# Patient Record
Sex: MALE | Race: WHITE | URBAN - METROPOLITAN AREA
[De-identification: names, ages, dates, MRNs, and addresses within clinical notes are randomized per-mention and may not be internally consistent; named-entity substitution may affect disease eponyms.]

---

## 2017-11-17 ENCOUNTER — APPOINTMENT (RX ONLY)
Dept: URBAN - METROPOLITAN AREA CLINIC 4 | Facility: CLINIC | Age: 62
Setting detail: DERMATOLOGY
End: 2017-11-17

## 2017-11-17 DIAGNOSIS — D22 MELANOCYTIC NEVI: ICD-10-CM

## 2017-11-17 DIAGNOSIS — D18.0 HEMANGIOMA: ICD-10-CM

## 2017-11-17 DIAGNOSIS — L82.1 OTHER SEBORRHEIC KERATOSIS: ICD-10-CM

## 2017-11-17 DIAGNOSIS — L81.4 OTHER MELANIN HYPERPIGMENTATION: ICD-10-CM

## 2017-11-17 DIAGNOSIS — L57.0 ACTINIC KERATOSIS: ICD-10-CM

## 2017-11-17 PROBLEM — D22.61 MELANOCYTIC NEVI OF RIGHT UPPER LIMB, INCLUDING SHOULDER: Status: ACTIVE | Noted: 2017-11-17

## 2017-11-17 PROBLEM — M12.9 ARTHROPATHY, UNSPECIFIED: Status: ACTIVE | Noted: 2017-11-17

## 2017-11-17 PROBLEM — D22.5 MELANOCYTIC NEVI OF TRUNK: Status: ACTIVE | Noted: 2017-11-17

## 2017-11-17 PROBLEM — D18.01 HEMANGIOMA OF SKIN AND SUBCUTANEOUS TISSUE: Status: ACTIVE | Noted: 2017-11-17

## 2017-11-17 PROBLEM — D22.62 MELANOCYTIC NEVI OF LEFT UPPER LIMB, INCLUDING SHOULDER: Status: ACTIVE | Noted: 2017-11-17

## 2017-11-17 PROBLEM — D22.39 MELANOCYTIC NEVI OF OTHER PARTS OF FACE: Status: ACTIVE | Noted: 2017-11-17

## 2017-11-17 PROCEDURE — ? LIQUID NITROGEN

## 2017-11-17 PROCEDURE — ? COUNSELING

## 2017-11-17 PROCEDURE — 99213 OFFICE O/P EST LOW 20 MIN: CPT | Mod: 25

## 2017-11-17 PROCEDURE — ? SUNSCREEN RECOMMENDATIONS

## 2017-11-17 PROCEDURE — 17004 DESTROY PREMAL LESIONS 15/>: CPT

## 2017-11-17 ASSESSMENT — LOCATION SIMPLE DESCRIPTION DERM
LOCATION SIMPLE: LEFT FOREHEAD
LOCATION SIMPLE: LEFT UPPER BACK
LOCATION SIMPLE: CHEST
LOCATION SIMPLE: LEFT EYEBROW
LOCATION SIMPLE: POSTERIOR SCALP
LOCATION SIMPLE: RIGHT FOREARM
LOCATION SIMPLE: SCALP
LOCATION SIMPLE: RIGHT EYEBROW
LOCATION SIMPLE: RIGHT CHEEK
LOCATION SIMPLE: TRAPEZIAL NECK
LOCATION SIMPLE: RIGHT EAR
LOCATION SIMPLE: LEFT CHEEK
LOCATION SIMPLE: LEFT OCCIPITAL SCALP
LOCATION SIMPLE: LEFT FOREARM

## 2017-11-17 ASSESSMENT — LOCATION DETAILED DESCRIPTION DERM
LOCATION DETAILED: LEFT INFERIOR CENTRAL MALAR CHEEK
LOCATION DETAILED: RIGHT INFERIOR POSTAURICULAR SKIN
LOCATION DETAILED: MID TRAPEZIAL NECK
LOCATION DETAILED: MID-OCCIPITAL SCALP
LOCATION DETAILED: RIGHT SUPERIOR CENTRAL MALAR CHEEK
LOCATION DETAILED: RIGHT CENTRAL EYEBROW
LOCATION DETAILED: RIGHT SUPERIOR LATERAL MALAR CHEEK
LOCATION DETAILED: LEFT PROXIMAL DORSAL FOREARM
LOCATION DETAILED: LEFT SUPERIOR LATERAL MALAR CHEEK
LOCATION DETAILED: RIGHT LATERAL SUPERIOR CHEST
LOCATION DETAILED: LEFT LATERAL EYEBROW
LOCATION DETAILED: LEFT MEDIAL FOREHEAD
LOCATION DETAILED: LEFT MID-UPPER BACK
LOCATION DETAILED: LEFT SUPERIOR OCCIPITAL SCALP
LOCATION DETAILED: RIGHT LATERAL EYEBROW
LOCATION DETAILED: LEFT FOREHEAD
LOCATION DETAILED: LEFT INFERIOR FOREHEAD
LOCATION DETAILED: LEFT CENTRAL MALAR CHEEK
LOCATION DETAILED: RIGHT DISTAL ULNAR DORSAL FOREARM
LOCATION DETAILED: RIGHT CENTRAL MALAR CHEEK
LOCATION DETAILED: RIGHT SUPERIOR HELIX
LOCATION DETAILED: RIGHT PROXIMAL DORSAL FOREARM
LOCATION DETAILED: STERNAL NOTCH
LOCATION DETAILED: LEFT MEDIAL UPPER BACK
LOCATION DETAILED: RIGHT ANTIHELIX
LOCATION DETAILED: LEFT SUPERIOR MEDIAL UPPER BACK

## 2017-11-17 ASSESSMENT — LOCATION ZONE DERM
LOCATION ZONE: SCALP
LOCATION ZONE: NECK
LOCATION ZONE: TRUNK
LOCATION ZONE: ARM
LOCATION ZONE: EAR
LOCATION ZONE: FACE

## 2017-11-17 NOTE — PROCEDURE: LIQUID NITROGEN
Duration Of Freeze Thaw-Cycle (Seconds): 3
Detail Level: Detailed
Render Post-Care Instructions In Note?: no
Post-Care Instructions: I reviewed with the patient in detail post-care instructions. Patient is to wear sunprotection, and avoid picking at any of the treated lesions. Pt may apply Vaseline to crusted or scabbing areas.
Number Of Freeze-Thaw Cycles: 2 freeze-thaw cycles
Consent: The patient's consent was obtained including but not limited to risks of crusting, scabbing, blistering, scarring, darker or lighter pigmentary change, recurrence, incomplete removal and infection.

## 2018-01-12 ENCOUNTER — HOSPITAL ENCOUNTER (OUTPATIENT)
Dept: LAB | Facility: MEDICAL CENTER | Age: 63
End: 2018-01-12
Attending: INTERNAL MEDICINE
Payer: COMMERCIAL

## 2018-01-12 LAB
ALBUMIN SERPL BCP-MCNC: 4.1 G/DL (ref 3.2–4.9)
ALP SERPL-CCNC: 56 U/L (ref 30–99)
ALT SERPL-CCNC: 13 U/L (ref 2–50)
AST SERPL-CCNC: 19 U/L (ref 12–45)
BILIRUB CONJ SERPL-MCNC: 0.1 MG/DL (ref 0.1–0.5)
BILIRUB INDIRECT SERPL-MCNC: 0.5 MG/DL (ref 0–1)
BILIRUB SERPL-MCNC: 0.6 MG/DL (ref 0.1–1.5)
CHOLEST SERPL-MCNC: 200 MG/DL (ref 100–199)
HDLC SERPL-MCNC: 45 MG/DL
LDLC SERPL CALC-MCNC: 130 MG/DL
PROT SERPL-MCNC: 6.9 G/DL (ref 6–8.2)
TRIGL SERPL-MCNC: 126 MG/DL (ref 0–149)

## 2018-01-12 PROCEDURE — 80061 LIPID PANEL: CPT

## 2018-01-12 PROCEDURE — 80076 HEPATIC FUNCTION PANEL: CPT

## 2018-01-12 PROCEDURE — 36415 COLL VENOUS BLD VENIPUNCTURE: CPT

## 2018-02-05 ENCOUNTER — HOSPITAL ENCOUNTER (OUTPATIENT)
Dept: LAB | Facility: MEDICAL CENTER | Age: 63
End: 2018-02-05
Attending: INTERNAL MEDICINE
Payer: COMMERCIAL

## 2018-02-05 LAB
ALBUMIN SERPL BCP-MCNC: 3.9 G/DL (ref 3.2–4.9)
ALP SERPL-CCNC: 67 U/L (ref 30–99)
ALT SERPL-CCNC: 15 U/L (ref 2–50)
AST SERPL-CCNC: 21 U/L (ref 12–45)
BILIRUB CONJ SERPL-MCNC: <0.1 MG/DL (ref 0.1–0.5)
BILIRUB INDIRECT SERPL-MCNC: NORMAL MG/DL (ref 0–1)
BILIRUB SERPL-MCNC: 0.4 MG/DL (ref 0.1–1.5)
CHOLEST SERPL-MCNC: 193 MG/DL (ref 100–199)
HDLC SERPL-MCNC: 47 MG/DL
LDLC SERPL CALC-MCNC: 131 MG/DL
PROT SERPL-MCNC: 6.8 G/DL (ref 6–8.2)
TRIGL SERPL-MCNC: 77 MG/DL (ref 0–149)

## 2018-02-05 PROCEDURE — 80061 LIPID PANEL: CPT

## 2018-02-05 PROCEDURE — 36415 COLL VENOUS BLD VENIPUNCTURE: CPT

## 2018-02-05 PROCEDURE — 80076 HEPATIC FUNCTION PANEL: CPT

## 2018-03-05 ENCOUNTER — HOSPITAL ENCOUNTER (OUTPATIENT)
Dept: LAB | Facility: MEDICAL CENTER | Age: 63
End: 2018-03-05
Attending: FAMILY MEDICINE
Payer: COMMERCIAL

## 2018-03-05 LAB
ALBUMIN SERPL BCP-MCNC: 4 G/DL (ref 3.2–4.9)
ALBUMIN/GLOB SERPL: 1.3 G/DL
ALP SERPL-CCNC: 59 U/L (ref 30–99)
ALT SERPL-CCNC: 9 U/L (ref 2–50)
ANION GAP SERPL CALC-SCNC: 6 MMOL/L (ref 0–11.9)
AST SERPL-CCNC: 17 U/L (ref 12–45)
BASOPHILS # BLD AUTO: 0.8 % (ref 0–1.8)
BASOPHILS # BLD: 0.06 K/UL (ref 0–0.12)
BILIRUB SERPL-MCNC: 0.5 MG/DL (ref 0.1–1.5)
BUN SERPL-MCNC: 18 MG/DL (ref 8–22)
CALCIUM SERPL-MCNC: 9.7 MG/DL (ref 8.5–10.5)
CHLORIDE SERPL-SCNC: 104 MMOL/L (ref 96–112)
CO2 SERPL-SCNC: 28 MMOL/L (ref 20–33)
CREAT SERPL-MCNC: 1.28 MG/DL (ref 0.5–1.4)
CRP SERPL HS-MCNC: 1.38 MG/DL (ref 0–0.75)
EOSINOPHIL # BLD AUTO: 0.16 K/UL (ref 0–0.51)
EOSINOPHIL NFR BLD: 2.1 % (ref 0–6.9)
ERYTHROCYTE [DISTWIDTH] IN BLOOD BY AUTOMATED COUNT: 43.1 FL (ref 35.9–50)
ERYTHROCYTE [SEDIMENTATION RATE] IN BLOOD BY WESTERGREN METHOD: 23 MM/HOUR (ref 0–20)
GLOBULIN SER CALC-MCNC: 3.1 G/DL (ref 1.9–3.5)
GLUCOSE SERPL-MCNC: 96 MG/DL (ref 65–99)
HCT VFR BLD AUTO: 50.1 % (ref 42–52)
HGB BLD-MCNC: 16.5 G/DL (ref 14–18)
IMM GRANULOCYTES # BLD AUTO: 0.03 K/UL (ref 0–0.11)
IMM GRANULOCYTES NFR BLD AUTO: 0.4 % (ref 0–0.9)
LYMPHOCYTES # BLD AUTO: 1.38 K/UL (ref 1–4.8)
LYMPHOCYTES NFR BLD: 18.1 % (ref 22–41)
MCH RBC QN AUTO: 30.1 PG (ref 27–33)
MCHC RBC AUTO-ENTMCNC: 32.9 G/DL (ref 33.7–35.3)
MCV RBC AUTO: 91.4 FL (ref 81.4–97.8)
MONOCYTES # BLD AUTO: 0.63 K/UL (ref 0–0.85)
MONOCYTES NFR BLD AUTO: 8.3 % (ref 0–13.4)
NEUTROPHILS # BLD AUTO: 5.35 K/UL (ref 1.82–7.42)
NEUTROPHILS NFR BLD: 70.3 % (ref 44–72)
NRBC # BLD AUTO: 0 K/UL
NRBC BLD-RTO: 0 /100 WBC
PLATELET # BLD AUTO: 286 K/UL (ref 164–446)
PMV BLD AUTO: 8.9 FL (ref 9–12.9)
POTASSIUM SERPL-SCNC: 4.3 MMOL/L (ref 3.6–5.5)
PROT SERPL-MCNC: 7.1 G/DL (ref 6–8.2)
RBC # BLD AUTO: 5.48 M/UL (ref 4.7–6.1)
SODIUM SERPL-SCNC: 138 MMOL/L (ref 135–145)
WBC # BLD AUTO: 7.6 K/UL (ref 4.8–10.8)

## 2018-03-05 PROCEDURE — 85025 COMPLETE CBC W/AUTO DIFF WBC: CPT

## 2018-03-05 PROCEDURE — 85652 RBC SED RATE AUTOMATED: CPT

## 2018-03-05 PROCEDURE — 86140 C-REACTIVE PROTEIN: CPT

## 2018-03-05 PROCEDURE — 36415 COLL VENOUS BLD VENIPUNCTURE: CPT

## 2018-03-05 PROCEDURE — 80053 COMPREHEN METABOLIC PANEL: CPT

## 2018-05-18 ENCOUNTER — HOSPITAL ENCOUNTER (OUTPATIENT)
Dept: LAB | Facility: MEDICAL CENTER | Age: 63
End: 2018-05-18
Attending: HOMEOPATH
Payer: COMMERCIAL

## 2018-05-18 LAB
25(OH)D3 SERPL-MCNC: 56 NG/ML (ref 30–100)
ALBUMIN SERPL BCP-MCNC: 4.1 G/DL (ref 3.2–4.9)
ALBUMIN/GLOB SERPL: 1.4 G/DL
ALP SERPL-CCNC: 73 U/L (ref 30–99)
ALT SERPL-CCNC: 10 U/L (ref 2–50)
ANION GAP SERPL CALC-SCNC: 7 MMOL/L (ref 0–11.9)
AST SERPL-CCNC: 14 U/L (ref 12–45)
BASOPHILS # BLD AUTO: 1 % (ref 0–1.8)
BASOPHILS # BLD: 0.08 K/UL (ref 0–0.12)
BILIRUB SERPL-MCNC: 0.5 MG/DL (ref 0.1–1.5)
BUN SERPL-MCNC: 14 MG/DL (ref 8–22)
CALCIUM SERPL-MCNC: 9.4 MG/DL (ref 8.5–10.5)
CHLORIDE SERPL-SCNC: 103 MMOL/L (ref 96–112)
CHOLEST SERPL-MCNC: 200 MG/DL (ref 100–199)
CO2 SERPL-SCNC: 27 MMOL/L (ref 20–33)
CREAT SERPL-MCNC: 1.06 MG/DL (ref 0.5–1.4)
EOSINOPHIL # BLD AUTO: 0.15 K/UL (ref 0–0.51)
EOSINOPHIL NFR BLD: 1.9 % (ref 0–6.9)
ERYTHROCYTE [DISTWIDTH] IN BLOOD BY AUTOMATED COUNT: 42 FL (ref 35.9–50)
ESTRADIOL SERPL-MCNC: 26 PG/ML
GLOBULIN SER CALC-MCNC: 3 G/DL (ref 1.9–3.5)
GLUCOSE SERPL-MCNC: 95 MG/DL (ref 65–99)
HCT VFR BLD AUTO: 47.5 % (ref 42–52)
HDLC SERPL-MCNC: 44 MG/DL
HGB BLD-MCNC: 15.7 G/DL (ref 14–18)
IMM GRANULOCYTES # BLD AUTO: 0.03 K/UL (ref 0–0.11)
IMM GRANULOCYTES NFR BLD AUTO: 0.4 % (ref 0–0.9)
LDLC SERPL CALC-MCNC: 135 MG/DL
LYMPHOCYTES # BLD AUTO: 1.48 K/UL (ref 1–4.8)
LYMPHOCYTES NFR BLD: 19 % (ref 22–41)
MCH RBC QN AUTO: 29.8 PG (ref 27–33)
MCHC RBC AUTO-ENTMCNC: 33.1 G/DL (ref 33.7–35.3)
MCV RBC AUTO: 90.3 FL (ref 81.4–97.8)
MONOCYTES # BLD AUTO: 0.67 K/UL (ref 0–0.85)
MONOCYTES NFR BLD AUTO: 8.6 % (ref 0–13.4)
NEUTROPHILS # BLD AUTO: 5.38 K/UL (ref 1.82–7.42)
NEUTROPHILS NFR BLD: 69.1 % (ref 44–72)
NRBC # BLD AUTO: 0 K/UL
NRBC BLD-RTO: 0 /100 WBC
PLATELET # BLD AUTO: 301 K/UL (ref 164–446)
PMV BLD AUTO: 9 FL (ref 9–12.9)
POTASSIUM SERPL-SCNC: 4.1 MMOL/L (ref 3.6–5.5)
PROT SERPL-MCNC: 7.1 G/DL (ref 6–8.2)
PSA SERPL-MCNC: 2.78 NG/ML (ref 0–4)
RBC # BLD AUTO: 5.26 M/UL (ref 4.7–6.1)
SODIUM SERPL-SCNC: 137 MMOL/L (ref 135–145)
T3FREE SERPL-MCNC: 3.76 PG/ML (ref 2.4–4.2)
T4 SERPL-MCNC: 10.3 UG/DL (ref 4–12)
TRIGL SERPL-MCNC: 106 MG/DL (ref 0–149)
TSH SERPL DL<=0.005 MIU/L-ACNC: 2.59 UIU/ML (ref 0.38–5.33)
WBC # BLD AUTO: 7.8 K/UL (ref 4.8–10.8)

## 2018-05-18 PROCEDURE — 82542 COL CHROMOTOGRAPHY QUAL/QUAN: CPT

## 2018-05-18 PROCEDURE — 84436 ASSAY OF TOTAL THYROXINE: CPT

## 2018-05-18 PROCEDURE — 36415 COLL VENOUS BLD VENIPUNCTURE: CPT

## 2018-05-18 PROCEDURE — 84270 ASSAY OF SEX HORMONE GLOBUL: CPT

## 2018-05-18 PROCEDURE — 84443 ASSAY THYROID STIM HORMONE: CPT

## 2018-05-18 PROCEDURE — 84403 ASSAY OF TOTAL TESTOSTERONE: CPT

## 2018-05-18 PROCEDURE — 84481 FREE ASSAY (FT-3): CPT

## 2018-05-18 PROCEDURE — 82670 ASSAY OF TOTAL ESTRADIOL: CPT

## 2018-05-18 PROCEDURE — 85025 COMPLETE CBC W/AUTO DIFF WBC: CPT

## 2018-05-18 PROCEDURE — 84153 ASSAY OF PSA TOTAL: CPT

## 2018-05-18 PROCEDURE — 80061 LIPID PANEL: CPT

## 2018-05-18 PROCEDURE — 80053 COMPREHEN METABOLIC PANEL: CPT

## 2018-05-18 PROCEDURE — 82306 VITAMIN D 25 HYDROXY: CPT

## 2018-05-20 LAB
SHBG SERPL-SCNC: 35 NMOL/L (ref 11–80)
TESTOST FREE MFR SERPL: 1.9 % (ref 1.6–2.9)
TESTOST FREE SERPL-MCNC: 136 PG/ML (ref 47–244)
TESTOST SERPL-MCNC: 705 NG/DL (ref 300–720)

## 2018-05-24 ENCOUNTER — APPOINTMENT (RX ONLY)
Dept: URBAN - METROPOLITAN AREA CLINIC 4 | Facility: CLINIC | Age: 63
Setting detail: DERMATOLOGY
End: 2018-05-24

## 2018-05-24 ENCOUNTER — HOSPITAL ENCOUNTER (OUTPATIENT)
Dept: LAB | Facility: MEDICAL CENTER | Age: 63
End: 2018-05-24
Attending: HOMEOPATH
Payer: COMMERCIAL

## 2018-05-24 DIAGNOSIS — D22 MELANOCYTIC NEVI: ICD-10-CM

## 2018-05-24 DIAGNOSIS — D18.0 HEMANGIOMA: ICD-10-CM

## 2018-05-24 DIAGNOSIS — L82.1 OTHER SEBORRHEIC KERATOSIS: ICD-10-CM

## 2018-05-24 DIAGNOSIS — L81.4 OTHER MELANIN HYPERPIGMENTATION: ICD-10-CM

## 2018-05-24 DIAGNOSIS — L57.0 ACTINIC KERATOSIS: ICD-10-CM

## 2018-05-24 PROBLEM — D18.01 HEMANGIOMA OF SKIN AND SUBCUTANEOUS TISSUE: Status: ACTIVE | Noted: 2018-05-24

## 2018-05-24 PROBLEM — D22.5 MELANOCYTIC NEVI OF TRUNK: Status: ACTIVE | Noted: 2018-05-24

## 2018-05-24 PROBLEM — D22.61 MELANOCYTIC NEVI OF RIGHT UPPER LIMB, INCLUDING SHOULDER: Status: ACTIVE | Noted: 2018-05-24

## 2018-05-24 PROBLEM — E78.5 HYPERLIPIDEMIA, UNSPECIFIED: Status: ACTIVE | Noted: 2018-05-24

## 2018-05-24 PROBLEM — E03.9 HYPOTHYROIDISM, UNSPECIFIED: Status: ACTIVE | Noted: 2018-05-24

## 2018-05-24 PROBLEM — D22.62 MELANOCYTIC NEVI OF LEFT UPPER LIMB, INCLUDING SHOULDER: Status: ACTIVE | Noted: 2018-05-24

## 2018-05-24 PROBLEM — I10 ESSENTIAL (PRIMARY) HYPERTENSION: Status: ACTIVE | Noted: 2018-05-24

## 2018-05-24 PROBLEM — D22.39 MELANOCYTIC NEVI OF OTHER PARTS OF FACE: Status: ACTIVE | Noted: 2018-05-24

## 2018-05-24 LAB — ANDROSTANOLONE SERPL-MCNC: 1993.6 PG/ML (ref 106–719)

## 2018-05-24 PROCEDURE — 99213 OFFICE O/P EST LOW 20 MIN: CPT | Mod: 25

## 2018-05-24 PROCEDURE — 17003 DESTRUCT PREMALG LES 2-14: CPT

## 2018-05-24 PROCEDURE — 36415 COLL VENOUS BLD VENIPUNCTURE: CPT

## 2018-05-24 PROCEDURE — ? LIQUID NITROGEN

## 2018-05-24 PROCEDURE — 86001 ALLERGEN SPECIFIC IGG: CPT

## 2018-05-24 PROCEDURE — ? SUNSCREEN RECOMMENDATIONS

## 2018-05-24 PROCEDURE — 17000 DESTRUCT PREMALG LESION: CPT

## 2018-05-24 PROCEDURE — ? COUNSELING

## 2018-05-24 ASSESSMENT — LOCATION SIMPLE DESCRIPTION DERM
LOCATION SIMPLE: RIGHT EAR
LOCATION SIMPLE: RIGHT CHEEK
LOCATION SIMPLE: TRAPEZIAL NECK
LOCATION SIMPLE: LEFT FOREARM
LOCATION SIMPLE: LEFT UPPER BACK
LOCATION SIMPLE: CHEST
LOCATION SIMPLE: LEFT CHEEK
LOCATION SIMPLE: RIGHT FOREARM

## 2018-05-24 ASSESSMENT — LOCATION ZONE DERM
LOCATION ZONE: NECK
LOCATION ZONE: ARM
LOCATION ZONE: TRUNK
LOCATION ZONE: EAR
LOCATION ZONE: FACE

## 2018-05-24 ASSESSMENT — LOCATION DETAILED DESCRIPTION DERM
LOCATION DETAILED: LEFT MEDIAL UPPER BACK
LOCATION DETAILED: RIGHT INFERIOR CRUS OF ANTIHELIX
LOCATION DETAILED: RIGHT INFERIOR CENTRAL MALAR CHEEK
LOCATION DETAILED: STERNAL NOTCH
LOCATION DETAILED: RIGHT PROXIMAL DORSAL FOREARM
LOCATION DETAILED: MID TRAPEZIAL NECK
LOCATION DETAILED: LEFT INFERIOR CENTRAL MALAR CHEEK
LOCATION DETAILED: RIGHT LATERAL SUPERIOR CHEST
LOCATION DETAILED: LEFT MID-UPPER BACK
LOCATION DETAILED: RIGHT CENTRAL MALAR CHEEK
LOCATION DETAILED: LEFT SUPERIOR MEDIAL UPPER BACK
LOCATION DETAILED: LEFT PROXIMAL DORSAL FOREARM

## 2018-05-24 NOTE — PROCEDURE: LIQUID NITROGEN
Render Post-Care Instructions In Note?: no
Post-Care Instructions: I reviewed with the patient in detail post-care instructions. Patient is to wear sunprotection, and avoid picking at any of the treated lesions. Pt may apply Vaseline to crusted or scabbing areas.
Duration Of Freeze Thaw-Cycle (Seconds): 3
Number Of Freeze-Thaw Cycles: 2 freeze-thaw cycles
Detail Level: Simple
Consent: The patient's consent was obtained including but not limited to risks of crusting, scabbing, blistering, scarring, darker or lighter pigmentary change, recurrence, incomplete removal and infection.

## 2018-06-03 LAB — MISCELLANEOUS LAB RESULT MISCLAB: NORMAL

## 2018-11-08 ENCOUNTER — APPOINTMENT (OUTPATIENT)
Dept: RADIOLOGY | Facility: MEDICAL CENTER | Age: 63
End: 2018-11-08
Attending: ORTHOPAEDIC SURGERY
Payer: COMMERCIAL

## 2018-11-08 DIAGNOSIS — M25.512 LEFT SHOULDER PAIN, UNSPECIFIED CHRONICITY: ICD-10-CM

## 2018-11-08 DIAGNOSIS — M25.511 RIGHT SHOULDER PAIN, UNSPECIFIED CHRONICITY: ICD-10-CM

## 2018-11-08 PROCEDURE — 73221 MRI JOINT UPR EXTREM W/O DYE: CPT | Mod: LT

## 2018-11-08 PROCEDURE — 73221 MRI JOINT UPR EXTREM W/O DYE: CPT | Mod: RT

## 2018-11-27 ENCOUNTER — APPOINTMENT (RX ONLY)
Dept: URBAN - METROPOLITAN AREA CLINIC 4 | Facility: CLINIC | Age: 63
Setting detail: DERMATOLOGY
End: 2018-11-27

## 2018-11-27 DIAGNOSIS — D22 MELANOCYTIC NEVI: ICD-10-CM

## 2018-11-27 DIAGNOSIS — D485 NEOPLASM OF UNCERTAIN BEHAVIOR OF SKIN: ICD-10-CM

## 2018-11-27 DIAGNOSIS — D18.0 HEMANGIOMA: ICD-10-CM

## 2018-11-27 DIAGNOSIS — L82.1 OTHER SEBORRHEIC KERATOSIS: ICD-10-CM

## 2018-11-27 DIAGNOSIS — L57.0 ACTINIC KERATOSIS: ICD-10-CM

## 2018-11-27 DIAGNOSIS — L81.4 OTHER MELANIN HYPERPIGMENTATION: ICD-10-CM

## 2018-11-27 PROBLEM — D48.5 NEOPLASM OF UNCERTAIN BEHAVIOR OF SKIN: Status: ACTIVE | Noted: 2018-11-27

## 2018-11-27 PROBLEM — D22.61 MELANOCYTIC NEVI OF RIGHT UPPER LIMB, INCLUDING SHOULDER: Status: ACTIVE | Noted: 2018-11-27

## 2018-11-27 PROBLEM — D18.01 HEMANGIOMA OF SKIN AND SUBCUTANEOUS TISSUE: Status: ACTIVE | Noted: 2018-11-27

## 2018-11-27 PROBLEM — D22.5 MELANOCYTIC NEVI OF TRUNK: Status: ACTIVE | Noted: 2018-11-27

## 2018-11-27 PROBLEM — D22.39 MELANOCYTIC NEVI OF OTHER PARTS OF FACE: Status: ACTIVE | Noted: 2018-11-27

## 2018-11-27 PROBLEM — D22.62 MELANOCYTIC NEVI OF LEFT UPPER LIMB, INCLUDING SHOULDER: Status: ACTIVE | Noted: 2018-11-27

## 2018-11-27 PROCEDURE — 17000 DESTRUCT PREMALG LESION: CPT

## 2018-11-27 PROCEDURE — ? SUNSCREEN RECOMMENDATIONS

## 2018-11-27 PROCEDURE — ? BIOPSY BY SHAVE METHOD

## 2018-11-27 PROCEDURE — ? COUNSELING

## 2018-11-27 PROCEDURE — 11100: CPT | Mod: 59

## 2018-11-27 PROCEDURE — 99213 OFFICE O/P EST LOW 20 MIN: CPT | Mod: 25

## 2018-11-27 PROCEDURE — 17003 DESTRUCT PREMALG LES 2-14: CPT

## 2018-11-27 PROCEDURE — ? LIQUID NITROGEN

## 2018-11-27 ASSESSMENT — LOCATION SIMPLE DESCRIPTION DERM
LOCATION SIMPLE: LEFT UPPER BACK
LOCATION SIMPLE: RIGHT TEMPLE
LOCATION SIMPLE: RIGHT EAR
LOCATION SIMPLE: CHEST
LOCATION SIMPLE: RIGHT FOREARM
LOCATION SIMPLE: LEFT EAR
LOCATION SIMPLE: LEFT TEMPLE
LOCATION SIMPLE: LEFT CHEEK
LOCATION SIMPLE: LEFT FOREHEAD
LOCATION SIMPLE: RIGHT WRIST
LOCATION SIMPLE: LEFT FOREARM
LOCATION SIMPLE: RIGHT CHEEK
LOCATION SIMPLE: TRAPEZIAL NECK

## 2018-11-27 ASSESSMENT — LOCATION ZONE DERM
LOCATION ZONE: ARM
LOCATION ZONE: TRUNK
LOCATION ZONE: EAR
LOCATION ZONE: FACE
LOCATION ZONE: NECK

## 2018-11-27 ASSESSMENT — LOCATION DETAILED DESCRIPTION DERM
LOCATION DETAILED: RIGHT LATERAL SUPERIOR CHEST
LOCATION DETAILED: STERNAL NOTCH
LOCATION DETAILED: LEFT VENTRAL PROXIMAL FOREARM
LOCATION DETAILED: MID TRAPEZIAL NECK
LOCATION DETAILED: LEFT MID-UPPER BACK
LOCATION DETAILED: RIGHT DORSAL WRIST
LOCATION DETAILED: LEFT MEDIAL UPPER BACK
LOCATION DETAILED: LEFT INFERIOR CENTRAL MALAR CHEEK
LOCATION DETAILED: RIGHT MEDIAL MALAR CHEEK
LOCATION DETAILED: LEFT SUPERIOR MEDIAL FOREHEAD
LOCATION DETAILED: RIGHT CENTRAL TEMPLE
LOCATION DETAILED: LEFT LATERAL TEMPLE
LOCATION DETAILED: RIGHT INFERIOR HELIX
LOCATION DETAILED: LEFT SUPERIOR MEDIAL UPPER BACK
LOCATION DETAILED: RIGHT PROXIMAL DORSAL FOREARM
LOCATION DETAILED: LEFT FOREHEAD
LOCATION DETAILED: LEFT DISTAL DORSAL FOREARM
LOCATION DETAILED: LEFT PROXIMAL DORSAL FOREARM
LOCATION DETAILED: LEFT SUPERIOR HELIX

## 2018-11-27 NOTE — PROCEDURE: LIQUID NITROGEN
Consent: The patient's consent was obtained including but not limited to risks of crusting, scabbing, blistering, scarring, darker or lighter pigmentary change, recurrence, incomplete removal and infection.
Detail Level: Simple
Post-Care Instructions: I reviewed with the patient in detail post-care instructions. Patient is to wear sunprotection, and avoid picking at any of the treated lesions. Pt may apply Vaseline to crusted or scabbing areas.
Duration Of Freeze Thaw-Cycle (Seconds): 3
Render Post-Care Instructions In Note?: no
Number Of Freeze-Thaw Cycles: 2 freeze-thaw cycles

## 2018-11-29 ENCOUNTER — TELEPHONE (OUTPATIENT)
Dept: SCHEDULING | Facility: IMAGING CENTER | Age: 63
End: 2018-11-29

## 2018-12-31 RX ORDER — ZOLPIDEM TARTRATE 10 MG/1
10 TABLET ORAL
Refills: 5 | COMMUNITY
Start: 2018-11-12 | End: 2019-02-06 | Stop reason: SDUPTHER

## 2018-12-31 RX ORDER — LEVOTHYROXINE SODIUM 88 UG/1
88 TABLET ORAL
Refills: 10 | COMMUNITY
Start: 2018-11-12 | End: 2019-04-17 | Stop reason: SDUPTHER

## 2018-12-31 RX ORDER — LISINOPRIL 5 MG/1
5 TABLET ORAL 2 TIMES DAILY
Refills: 11 | COMMUNITY
Start: 2018-11-12 | End: 2019-11-13 | Stop reason: SDUPTHER

## 2018-12-31 RX ORDER — TAMSULOSIN HYDROCHLORIDE 0.4 MG/1
0.4 CAPSULE ORAL
Refills: 23 | COMMUNITY
Start: 2018-11-12 | End: 2019-11-13 | Stop reason: SDUPTHER

## 2019-01-03 ENCOUNTER — OFFICE VISIT (OUTPATIENT)
Dept: MEDICAL GROUP | Facility: MEDICAL CENTER | Age: 64
End: 2019-01-03
Payer: COMMERCIAL

## 2019-01-03 VITALS
BODY MASS INDEX: 26.94 KG/M2 | HEIGHT: 69 IN | TEMPERATURE: 98 F | SYSTOLIC BLOOD PRESSURE: 122 MMHG | HEART RATE: 64 BPM | OXYGEN SATURATION: 95 % | WEIGHT: 181.88 LBS | DIASTOLIC BLOOD PRESSURE: 74 MMHG | RESPIRATION RATE: 14 BRPM

## 2019-01-03 DIAGNOSIS — Z76.89 ENCOUNTER TO ESTABLISH CARE: ICD-10-CM

## 2019-01-03 DIAGNOSIS — Z79.899 CONTROLLED SUBSTANCE AGREEMENT SIGNED: ICD-10-CM

## 2019-01-03 DIAGNOSIS — Z23 NEED FOR VACCINATION: ICD-10-CM

## 2019-01-03 DIAGNOSIS — E55.9 HYPOVITAMINOSIS D: ICD-10-CM

## 2019-01-03 DIAGNOSIS — G47.9 SLEEP DISORDER: ICD-10-CM

## 2019-01-03 DIAGNOSIS — I10 ESSENTIAL HYPERTENSION: ICD-10-CM

## 2019-01-03 DIAGNOSIS — R53.83 FATIGUE, UNSPECIFIED TYPE: ICD-10-CM

## 2019-01-03 DIAGNOSIS — N40.1 BENIGN PROSTATIC HYPERPLASIA WITH LOWER URINARY TRACT SYMPTOMS, SYMPTOM DETAILS UNSPECIFIED: ICD-10-CM

## 2019-01-03 DIAGNOSIS — Z00.00 HEALTH CARE MAINTENANCE: ICD-10-CM

## 2019-01-03 DIAGNOSIS — M25.512 CHRONIC LEFT SHOULDER PAIN: ICD-10-CM

## 2019-01-03 DIAGNOSIS — E78.2 MIXED HYPERLIPIDEMIA: ICD-10-CM

## 2019-01-03 DIAGNOSIS — G89.29 CHRONIC LEFT SHOULDER PAIN: ICD-10-CM

## 2019-01-03 DIAGNOSIS — Z72.0 TOBACCO ABUSE DISORDER: ICD-10-CM

## 2019-01-03 PROCEDURE — 99214 OFFICE O/P EST MOD 30 MIN: CPT | Mod: 25 | Performed by: INTERNAL MEDICINE

## 2019-01-03 PROCEDURE — 90715 TDAP VACCINE 7 YRS/> IM: CPT | Performed by: INTERNAL MEDICINE

## 2019-01-03 PROCEDURE — 90471 IMMUNIZATION ADMIN: CPT | Performed by: INTERNAL MEDICINE

## 2019-01-03 RX ORDER — TRAZODONE HYDROCHLORIDE 100 MG/1
100 TABLET ORAL
Qty: 30 TAB | Refills: 2 | Status: SHIPPED | OUTPATIENT
Start: 2019-01-03 | End: 2019-08-16

## 2019-01-03 NOTE — PROGRESS NOTES
CHIEF COMPLAINT  Chief Complaint   Patient presents with   • Establish Care   HTN    HPI  Patient is a 63 y.o. male patient who presents today for the following     Hypertension, arrhytmia  Meds: Lisinopril 5 mg daily, metoprolol 25 mg daily, taking as prescribed.   Measuring BP at home: no.  Denies:  -  headaches, vision problems, tinnitus.                 -  chest pain/pressure, palpitations, irregular heart beats, exertional, dyspnea, peripheral edema.      - medication side effect: unusual fatigue, slow heartbeat, foot/leg swelling, cough.  Low salt diet: advised  Diet: Advised  Exercise: Daily physical activity  BMI: 26  FH of HTN: parents    Hyperlipidemia  The patient had abnormal lipid panel, no medications.  Diet / Exercise/BMI:  As above  FH: unknown    Hypovitaminosis D  The patient had low vitamin D level.   No vitamin D supplement.    Insomnia  The patient has trouble to go and stay asleep, used Ambien, needs a refill.  Discussed sleep hygiene:   - Go to bed and wake up at consistent times whether work/school day or not.   - Keep room dark, quiet, and comfortable.   - Don't have naps.  - Increase exposure to sunlight during awake times and avoid bright lights before going to sleep.   - Avoid stimulant or caffeine use more than 4 hours after wake time.   - Avoid meal or exercise 2-3 hours prior to going to bed.    BPH  On flomax, 0.4 mf QD.  Nocturia: x1.  Weak stream/dribbling  Bladder fullness / incomplete emptying  Hesitancy.    Denies:   · Frequency  · Urgency  He has been followed up by urology.    Lower back pain, fatigue  - Onset: Mid 50s  - Triger: Repetitive movement  - located in: Left shoulder  - intensity:  Moderate to severe  - quality:  dull and sharp   - radiation:  no  - alleviating factors are:  Rest, tramadol TID  -  exacerbating factors are:  activity  - accompanied:    - no numbness, weakness, tingling, fever, chills   - fatigue  - course: Worsening  - therapy: as above   -Pending  orthopedic surgery    Reviewed PMH, PSH, FH, SH, ALL, HCM/IMM, no changes  Reviewed MEDS, no changes    Patient Active Problem List    Diagnosis Date Noted   • Vertigo 11/08/2012     Priority: High   • Depression, reactive 10/18/2011   • Tobacco abuse disorder 10/18/2011   • Fatigue 06/04/2010   • Vitamin D deficiency 06/04/2010   • Displacement of lumbar intervertebral disc without myelopathy    • Essential hypertension    • Mixed hyperlipidemia    • Esophageal reflux    • Disturbance in sleep behavior    • Palpitations    • Degenerative disc disease      CURRENT MEDICATIONS  Current Outpatient Prescriptions   Medication Sig Dispense Refill   • levothyroxine (SYNTHROID) 88 MCG Tab   10   • lisinopril (PRINIVIL) 5 MG Tab   11   • metoprolol (LOPRESSOR) 25 MG Tab   10   • tamsulosin (FLOMAX) 0.4 MG capsule   23   • zolpidem (AMBIEN) 10 MG Tab   5   • lisinopril (PRINIVIL) 20 MG TABS Take 1 Tab by mouth every day. 90 Each 3   • meclizine (ANTIVERT) 25 MG TABS Take 1 Tab by mouth 3 times a day. 30 Tab 3   • hydrocodone-acetaminophen (NORCO) 7.5-325 MG per tablet Take 0.5 Tabs by mouth 2 Times a Day.       • Calcium Carbonate (CALCIUM 600 PO) Take 1 Tab by mouth 3 times a day.       • GLUCOSAMINE PO Take 1 Tab by mouth every day.       • Ascorbic Acid (VITAMIN C) 1000 MG TABS Take 1 Tab by mouth every day.       • Potassium Gluconate 595 MG TABS Take 1 Tab by mouth 3 times a day.       • MAGNESIUM PO Take 1 Tab by mouth 2 Times a Day.       • ibuprofen (MOTRIN) 200 MG TABS Take 800 mg by mouth 2 Times a Day.       • AMBIEN CR 12.5 MG CR tablet TAKE 1 TABLET BY MOUTH NIGHTLY AT BEDTIME AS NEEDED FOR SLEEP 30 Tab 5   • citalopram (CELEXA) 40 MG TABS Take 1 Tab by mouth every day. 30 Each 6   • tramadol (ULTRAM) 50 MG TABS Take 50 mg by mouth 3 times a day as needed. Indications: Moderate to Moderately Severe Pain       No current facility-administered medications for this visit.      ALLERGIES  Allergies: Patient has no  known allergies.  PAST MEDICAL HISTORY  Past Medical History:   Diagnosis Date   • Depression, reactive 10/18/2011   • Tobacco abuse disorder 10/18/2011   • Fatigue 2010   • Unspecified vitamin D deficiency 2010   • ED (erectile dysfunction) 2010   • Arrhythmia    • Arthritis     hands   • Degenerative disc disease     L4-5, L5-S1 most severe   • Depression    • Displacement of lumbar intervertebral disc without myelopathy     uncontrolled on Right   • Esophageal reflux     uncontrolled   • Indigestion    • Mixed hyperlipidemia     uncontrolled  Last test: 2007:216/183/52/127   • Pain     lower back 4/10   • Palpitations     uncontrolled   • Sleep apnea    • Sleep disturbance, unspecified     uncontrolled   • Unspecified essential hypertension     Controlled     SURGICAL HISTORY  He  has a past surgical history that includes lumbar fusion posterior (9/15/2010); lumbar fusion posterior (10/18/2011); lumbar decompression (10/18/2011); and hardware removal neuro (10/18/2011).  SOCIAL HISTORY  Social History   Substance Use Topics   • Smoking status: Never Smoker   • Smokeless tobacco: Current User     Types: Chew      Comment: 1 can every 2 days for 30 years ()   • Alcohol use No     Social History     Social History Narrative   • No narrative on file     FAMILY HISTORY  Family History   Problem Relation Age of Onset   • Hypertension Mother    • Arthritis Mother    • Heart Disease Mother         atrial fibrillation   • Diabetes Father    • Cancer Father         PROSTATE CA   • Heart Disease Father         CAD   • Hypertension Father      Family Status   Relation Status   • Mo         heart related death   • Fa         complications from prostate CA       ROS   Constitutional: Negative for fever, chills.  HENT: Negative for congestion.  Eyes: Negative for vision problems.   Respiratory: Negative for shortness of breath.  Cardiovascular: Negative for chest pain, palpitations. And per  "HPI.  Gastrointestinal: Negative for heartburn, abdominal pain.   Genitourinary: Negative for dysuria. And per HPI.  Musculoskeletal: As above.   Skin: Negative for rash and itching.   Neuro: Negative for dizziness, headaches.   Endo/Heme/Allergies: Does not bruise/bleed easily.   Psychiatric/Behavioral: Negative for depression. And per HPI.    PHYSICAL EXAM   /74 (BP Location: Left arm, Patient Position: Sitting, BP Cuff Size: Adult)   Pulse 64   Temp 36.7 °C (98 °F) (Temporal)   Resp 14   Ht 1.753 m (5' 9\")   Wt 82.5 kg (181 lb 14.1 oz)   SpO2 95%   BMI 26.86 kg/m²    General:  NAD, well appearing  HEENT:   NC/AT, PERRLA, EOMI, TMs are clear. Oropharyngeal mucosa is pink,  without lesions;  no cervical / supraclavicular  lymphadenopathy, no thyromegaly.    Cardiovascular: RRR.   No m/r/g. No carotid bruits .      Lungs:   CTAB, no w/r/r, no respiratory distress.  Abdomen: Soft, NT/ND + BS;  no masses or hepatosplenomegaly.  Extremities:  2+ DP and radial pulses bilaterally.  No c/c/e.   Skin:  Warm, dry.  No erythema. No rash.   Neurologic: Alert & oriented x 3.  No focal deficits.  Psychiatric:  Affect normal, mood normal, judgment normal.    LABS     Labs are reviewed and discussed with a patient  Lab Results   Component Value Date/Time    CHOLSTRLTOT 200 (H) 05/18/2018 06:14 AM     (H) 05/18/2018 06:14 AM    HDL 44 05/18/2018 06:14 AM    TRIGLYCERIDE 106 05/18/2018 06:14 AM       Lab Results   Component Value Date/Time    SODIUM 137 05/18/2018 06:14 AM    POTASSIUM 4.1 05/18/2018 06:14 AM    CHLORIDE 103 05/18/2018 06:14 AM    CO2 27 05/18/2018 06:14 AM    GLUCOSE 95 05/18/2018 06:14 AM    BUN 14 05/18/2018 06:14 AM    CREATININE 1.06 05/18/2018 06:14 AM    CREATININE 1.2 05/12/2009 03:01 PM     Lab Results   Component Value Date/Time    ALKPHOSPHAT 73 05/18/2018 06:14 AM    ASTSGOT 14 05/18/2018 06:14 AM    ALTSGPT 10 05/18/2018 06:14 AM    TBILIRUBIN 0.5 05/18/2018 06:14 AM      No results " found for: HBA1C  No results found for: TSH  Lab Results   Component Value Date/Time    FREET4 1.06 06/07/2010 08:45 AM    FREET4 1.20 03/17/2010 11:50 AM       Lab Results   Component Value Date/Time    WBC 7.8 05/18/2018 06:14 AM    RBC 5.26 05/18/2018 06:14 AM    HEMOGLOBIN 15.7 05/18/2018 06:14 AM    HEMATOCRIT 47.5 05/18/2018 06:14 AM    MCV 90.3 05/18/2018 06:14 AM    MCH 29.8 05/18/2018 06:14 AM    MCHC 33.1 (L) 05/18/2018 06:14 AM    MPV 9.0 05/18/2018 06:14 AM    NEUTSPOLYS 69.10 05/18/2018 06:14 AM    LYMPHOCYTES 19.00 (L) 05/18/2018 06:14 AM    MONOCYTES 8.60 05/18/2018 06:14 AM    EOSINOPHILS 1.90 05/18/2018 06:14 AM    BASOPHILS 1.00 05/18/2018 06:14 AM    HYPOCHROMIA 1+ 01/23/2012 10:41 AM      IMAGING     None    ASSESMENT AND PLAN     1. Essential hypertension  Controlled, continue current treatment  - COMP METABOLIC PANEL; Future    2. Mixed hyperlipidemia  Pending labs, continue current treatment  - Lipid Profile; Future    3. Hypovitaminosis D  - TSH; Future  - FREE THYROXINE; Future  - VITAMIN D,25 HYDROXY; Future    4. Sleep disorder  Discussed about treatment options.  He is on tramadol, so he is given:  - traZODone (DESYREL) 100 MG Tab; Take 1 Tab by mouth every bedtime.  Dispense: 30 Tab; Refill: 2    5. Benign prostatic hyperplasia with lower urinary tract symptoms, symptom details unspecified  Persistent symptoms.  He has been followed up by urology.    6. Chronic left shoulder pain  - Controlled Substance Treatment Agreement  - [facility] S[facility]; Future  7. Controlled substance agreement signed  - Controlled Substance Treatment Agreement  - [facility] S[facility]; Future  Obtained and reviewed patient utilization report from [facility] pharmacy database on 1/3/2019 6:12 PM  prior to writing prescription for controlled substance II, III or IV per [place] bill . Based on assessment of the report, the prescription is medically necessary.     8. Fatigue  - TSH;  Future  - FREE THYROXINE; Future  - VITAMIN D,25 HYDROXY; Future effects. Vaccine was given by my medical assistant under my supervision.    9. Tobacco abuse disorder  Advised to quit smoking    10. Need for vaccination  - Tdap =>6yo IM  Information was provided to the patient regarding the vaccine, including side effects. Vaccine was given by my medical assistant under my supervision.    11. Health care maintenance  12. Encounter to establish care  Reviewed PMH, PSH, FH, SH, ALL, MEDS, HCM/IMM.     Counseling:   - Smoking:  Nonsmoker    Followup: within 2 weeks, tramadol refill    All questions are answered.    Please note that this dictation was created using voice recognition software, and that there might be errors of jerrica and possibly content.

## 2019-01-09 DIAGNOSIS — I48.92 ATRIAL FLUTTER, UNSPECIFIED TYPE (HCC): ICD-10-CM

## 2019-01-09 DIAGNOSIS — R00.2 PALPITATIONS: ICD-10-CM

## 2019-01-10 ENCOUNTER — TELEPHONE (OUTPATIENT)
Dept: CARDIOLOGY | Facility: MEDICAL CENTER | Age: 64
End: 2019-01-10

## 2019-01-10 ENCOUNTER — OFFICE VISIT (OUTPATIENT)
Dept: CARDIOLOGY | Facility: MEDICAL CENTER | Age: 64
End: 2019-01-10
Payer: COMMERCIAL

## 2019-01-10 VITALS — WEIGHT: 179.78 LBS | BODY MASS INDEX: 26.63 KG/M2 | HEIGHT: 69 IN

## 2019-01-10 DIAGNOSIS — G47.33 OSA (OBSTRUCTIVE SLEEP APNEA): ICD-10-CM

## 2019-01-10 DIAGNOSIS — I48.3 TYPICAL ATRIAL FLUTTER (HCC): ICD-10-CM

## 2019-01-10 DIAGNOSIS — I10 ESSENTIAL HYPERTENSION: ICD-10-CM

## 2019-01-10 DIAGNOSIS — Z01.810 PRE-OPERATIVE CARDIOVASCULAR EXAMINATION: ICD-10-CM

## 2019-01-10 DIAGNOSIS — Z86.79 S/P ABLATION OF ATRIAL FLUTTER: Primary | ICD-10-CM

## 2019-01-10 DIAGNOSIS — Z98.890 S/P ABLATION OF ATRIAL FLUTTER: Primary | ICD-10-CM

## 2019-01-10 DIAGNOSIS — Z78.9 STATIN INTOLERANCE: ICD-10-CM

## 2019-01-10 DIAGNOSIS — Z91.89 OTHER SPECIFIED PERSONAL RISK FACTORS, NOT ELSEWHERE CLASSIFIED: ICD-10-CM

## 2019-01-10 DIAGNOSIS — E78.2 MIXED HYPERLIPIDEMIA: ICD-10-CM

## 2019-01-10 PROCEDURE — 99204 OFFICE O/P NEW MOD 45 MIN: CPT | Performed by: INTERNAL MEDICINE

## 2019-01-10 RX ORDER — TRAMADOL HYDROCHLORIDE 50 MG/1
50-100 TABLET ORAL 3 TIMES DAILY
COMMUNITY
End: 2019-04-17 | Stop reason: SDUPTHER

## 2019-01-10 ASSESSMENT — ENCOUNTER SYMPTOMS: INSOMNIA: 1

## 2019-01-10 NOTE — LETTER
PROCEDURE/SURGERY CLEARANCE FORM      Encounter Date: 1/10/2019    Patient: Marcial Morrison  YOB: 1955    CARDIOLOGIST: Minh Taveras M.D.  REFERRING DOCTOR:  Consuelo Li M.D.    Patient does not have AICD or PPM.     The above patient is low risk from cardiovascular standpoint to have the following procedure/surgery: Shoulder Surgery                                                            MD Signature     Minh Taveras M.D.

## 2019-01-10 NOTE — TELEPHONE ENCOUNTER
WILLI Damon Dr. Anderson asked to have a letter sent to Dr. Consuelo Li at [facility] for shoulder surgery clearance.   Thanks Brooklyn!   Sandra      Pt seen in clinic today.     Clarified w/ Dr Taveras, he confirmed above information, per Dr Taveras, Yes, please send letter, low risk.     Clearance letter drafted w/ Dr Taveras recommendations faxed to Dr Consuelo Li, F#[phone].

## 2019-01-11 NOTE — PROGRESS NOTES
Chief Complaint   Patient presents with   • Atrial Flutter       Subjective:   Marcial Morrison is a 63 -year-old man with a history of typical, clockwise atrial flutter status post ablation in 7/30/2014. He has dyslipidemia, and statin intolerance.    He has no cardiovascular complaints today including no palpitations consistent with recurrence of his atrial flutter.    He comes in with his wife, both are very pleasant.  He is an  by [employer].  He had some questions as to whether an electrical shock could have precipitated his original event of atrial flutter.    We reviewed his severe previous intolerance to atorvastatin with severe myalgias after only 3 days of medication use.  They had investigated statins reading online, and of course come across several issues.    He relates to me previous mild sleep apnea and worse daytime somnolence.  He tells me about using Ambien as a sleep aid.  He describes to me his shoulder pain pending rotator cuff repair.    Past Medical History:   Diagnosis Date   • Arrhythmia    • Arthritis     hands   • Degenerative disc disease     L4-5, L5-S1 most severe   • Depression    • Depression, reactive 10/18/2011   • Displacement of lumbar intervertebral disc without myelopathy     uncontrolled on Right   • ED (erectile dysfunction) 6/4/2010   • Esophageal reflux     uncontrolled   • Fatigue 6/4/2010   • Indigestion    • Mixed hyperlipidemia     uncontrolled  Last test: 6/2007:216/183/52/127   • Pain     lower back 4/10   • Palpitations     uncontrolled   • Sleep apnea    • Sleep disturbance, unspecified     uncontrolled   • Tobacco abuse disorder 10/18/2011   • Unspecified essential hypertension     Controlled   • Unspecified vitamin D deficiency 6/4/2010     Past Surgical History:   Procedure Laterality Date   • LUMBAR FUSION POSTERIOR  10/18/2011    Performed by SHAWNA DALTON at SURGERY [facility]   • LUMBAR DECOMPRESSION  10/18/2011    Performed by SHAWNA DALTON at  SURGERY [facility]   • HARDWARE REMOVAL NEURO  10/18/2011    Performed by SHAWNA DALTON at SURGERY [facility]   • LUMBAR FUSION POSTERIOR  9/15/2010    Performed by SHAWNA DALTON at SURGERY [facility]     Family History   Problem Relation Age of Onset   • Hypertension Mother    • Arthritis Mother    • Heart Disease Mother         atrial fibrillation   • Diabetes Father    • Cancer Father         PROSTATE CA   • Heart Disease Father         CAD   • Hypertension Father      Social History     Social History   • Marital status:      Spouse name: N/A   • Number of children: N/A   • Years of education: N/A     Occupational History   • Not on file.     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     • traZODone (DESYREL) 100 MG Tab Take 1 Tab by mouth every bedtime. 30 Tab 2   • levothyroxine (SYNTHROID) 88 MCG Tab   10   • lisinopril (PRINIVIL) 5 MG Tab   11   • metoprolol (LOPRESSOR) 25 MG Tab   10   • tamsulosin (FLOMAX) 0.4 MG capsule   23   • zolpidem (AMBIEN) 10 MG Tab   5   • Calcium Carbonate (CALCIUM 600 PO) Take 1 Tab by mouth 3 times a day.       • GLUCOSAMINE PO Take 1 Tab by mouth every day.       • Ascorbic Acid (VITAMIN C) 1000 MG TABS Take 1 Tab by mouth every day.       • [DISCONTINUED] lisinopril (PRINIVIL) 20 MG TABS Take 1 Tab by mouth every  "day. 90 Each 3   • [DISCONTINUED] meclizine (ANTIVERT) 25 MG TABS Take 1 Tab by mouth 3 times a day. 30 Tab 3   • [DISCONTINUED] hydrocodone-acetaminophen (NORCO) 7.5-325 MG per tablet Take 0.5 Tabs by mouth 2 Times a Day.       • [DISCONTINUED] Potassium Gluconate 595 MG TABS Take 1 Tab by mouth 3 times a day.       • [DISCONTINUED] MAGNESIUM PO Take 1 Tab by mouth 2 Times a Day.       • [DISCONTINUED] ibuprofen (MOTRIN) 200 MG TABS Take 800 mg by mouth 2 Times a Day.       • [DISCONTINUED] AMBIEN CR 12.5 MG CR tablet TAKE 1 TABLET BY MOUTH NIGHTLY AT BEDTIME AS NEEDED FOR SLEEP 30 Tab 5   • [DISCONTINUED] citalopram (CELEXA) 40 MG TABS Take 1 Tab by mouth every day. 30 Each 6   • [DISCONTINUED] tramadol (ULTRAM) 50 MG TABS Take 50 mg by mouth 3 times a day as needed. Indications: Moderate to Moderately Severe Pain       No facility-administered encounter medications on file as of 1/10/2019.      Review of Systems   Genitourinary: Positive for dysuria, frequency and urgency.   Musculoskeletal: Positive for joint pain (Bilateral shoulders).   Psychiatric/Behavioral: The patient has insomnia.    All other systems reviewed and are negative.       Objective:   Ht 1.753 m (5' 9\")   Wt 81.5 kg (179 lb 12.6 oz)   BMI 26.55 kg/m²     Physical Exam   Constitutional: He is oriented to person, place, and time. He appears well-developed and well-nourished. No distress.   Pleasant, middle-aged man in no distress accompanied by his wife   Eyes: Pupils are equal, round, and reactive to light. EOM are normal.   Neck: No JVD present.   Cardiovascular: Normal rate and regular rhythm.  Exam reveals no gallop and no friction rub.    No murmur heard.  No carotid bruits   Pulmonary/Chest: Effort normal and breath sounds normal. No respiratory distress. He has no wheezes. He has no rales.   Abdominal: Soft. Bowel sounds are normal. He exhibits no distension.   Musculoskeletal: He exhibits no edema (No significant lower extremity edema " bilaterally).   Neurological: He is alert and oriented to person, place, and time.   Skin: Skin is warm and dry. No rash noted. He is not diaphoretic. No erythema. No pallor.   Psychiatric: He has a normal mood and affect. Judgment and thought content normal.   Nursing note and vitals reviewed.    Lab Results   Component Value Date/Time    WBC 7.8 05/18/2018 06:14 AM    RBC 5.26 05/18/2018 06:14 AM    HEMOGLOBIN 15.7 05/18/2018 06:14 AM    HEMATOCRIT 47.5 05/18/2018 06:14 AM    MCV 90.3 05/18/2018 06:14 AM    MCH 29.8 05/18/2018 06:14 AM    MCHC 33.1 (L) 05/18/2018 06:14 AM    MPV 9.0 05/18/2018 06:14 AM        Lab Results   Component Value Date/Time    SODIUM 137 05/18/2018 06:14 AM    POTASSIUM 4.1 05/18/2018 06:14 AM    CHLORIDE 103 05/18/2018 06:14 AM    CO2 27 05/18/2018 06:14 AM    GLUCOSE 95 05/18/2018 06:14 AM    BUN 14 05/18/2018 06:14 AM    CREATININE 1.06 05/18/2018 06:14 AM    CREATININE 1.2 05/12/2009 03:01 PM        Lab Results   Component Value Date/Time    ASTSGOT 14 05/18/2018 06:14 AM    ALTSGPT 10 05/18/2018 06:14 AM        Lab Results   Component Value Date/Time    CHOLSTRLTOT 200 (H) 05/18/2018 06:14 AM     (H) 05/18/2018 06:14 AM    HDL 44 05/18/2018 06:14 AM    TRIGLYCERIDE 106 05/18/2018 06:14 AM         No results found for this or any previous visit.    EKG, 1/18/2018, tracings and report reviewed, my dictation: Sinus bradycardia with rate of 51 bpm, no conduction system disease, normal FL interval of 180 ms    EKG, 7/30/2014, tracings and report reviewed, my interpretation (located on page 62/88 of [facility] records scanned 12/19/2018): Demonstrates typical sawtooth atrial flutter    Echocardiogram, 5/26/2016, [facility]: Left ventricular ejection fraction 55%, mild mitral regurgitation, no other significant valvular abnormalities.  Left atrial volume index is measured at 26 mL/meter squared    Assessment:     1. S/P ablation of atrial flutter     2. GRACIELA (obstructive sleep apnea)   REFERRAL TO SLEEP STUDIES   3. Essential hypertension     4. Mixed hyperlipidemia  CT-CARDIAC SCORING   5. Pre-operative cardiovascular examination     6. Other specified personal risk factors, not elsewhere classified  CT-CARDIAC SCORING   7. Typical atrial flutter (HCC)     8. Statin intolerance         Medical Decision Making:  Today's Assessment / Status / Plan:     He is doing very well today from a cardiovascular perspective and has no complaints.  There is no clinical evidence of recurrence of his atrial flutter related to which I do not think he has an elevated risk of stroke.    His blood pressure is wonderfully controlled measured in our office at 118/72 mmHg.    I did review with him his fairly elevated cholesterol by serial laboratories both in the [place]'s system, and an hours.  He discussed with me his fairly severe side effects with atorvastatin after only 3 days of use.  I ordered a CT coronary calcium scan to adjudicate as to whether or not he does need lipid therapy outside of lifestyle modification.  We discussed at length alternatives including low-dose Crestor, Praluent, or Repatha.  Again, the use of those medications would be predicated on the extent to which we see coronary calcifications with his dyslipidemia.    Finally, he has no significant risk factors for adverse cardiovascular outcome with upcoming planned shoulder arthroplasty.  Our office has sent a clearance to his orthopedic surgeon to whom I will direct a copy of this note as well.    Minh Taveras MD  Cardiologist, [facility]     Return in about 6 months (around 7/10/2019).

## 2019-01-18 ENCOUNTER — HOSPITAL ENCOUNTER (OUTPATIENT)
Dept: LAB | Facility: MEDICAL CENTER | Age: 64
End: 2019-01-18
Attending: INTERNAL MEDICINE
Payer: COMMERCIAL

## 2019-01-18 DIAGNOSIS — E55.9 HYPOVITAMINOSIS D: ICD-10-CM

## 2019-01-18 DIAGNOSIS — I10 ESSENTIAL HYPERTENSION: ICD-10-CM

## 2019-01-18 DIAGNOSIS — E78.2 MIXED HYPERLIPIDEMIA: ICD-10-CM

## 2019-01-18 LAB
25(OH)D3 SERPL-MCNC: 81 NG/ML (ref 30–100)
ALBUMIN SERPL BCP-MCNC: 4 G/DL (ref 3.2–4.9)
ALBUMIN/GLOB SERPL: 1.1 G/DL
ALP SERPL-CCNC: 85 U/L (ref 30–99)
ALT SERPL-CCNC: 9 U/L (ref 2–50)
ANION GAP SERPL CALC-SCNC: 8 MMOL/L (ref 0–11.9)
AST SERPL-CCNC: 16 U/L (ref 12–45)
BILIRUB SERPL-MCNC: 0.5 MG/DL (ref 0.1–1.5)
BUN SERPL-MCNC: 16 MG/DL (ref 8–22)
CALCIUM SERPL-MCNC: 9.8 MG/DL (ref 8.5–10.5)
CHLORIDE SERPL-SCNC: 105 MMOL/L (ref 96–112)
CHOLEST SERPL-MCNC: 185 MG/DL (ref 100–199)
CO2 SERPL-SCNC: 26 MMOL/L (ref 20–33)
CREAT SERPL-MCNC: 1.12 MG/DL (ref 0.5–1.4)
FASTING STATUS PATIENT QL REPORTED: NORMAL
GLOBULIN SER CALC-MCNC: 3.7 G/DL (ref 1.9–3.5)
GLUCOSE SERPL-MCNC: 98 MG/DL (ref 65–99)
HDLC SERPL-MCNC: 36 MG/DL
LDLC SERPL CALC-MCNC: 132 MG/DL
POTASSIUM SERPL-SCNC: 4.1 MMOL/L (ref 3.6–5.5)
PROT SERPL-MCNC: 7.7 G/DL (ref 6–8.2)
SODIUM SERPL-SCNC: 139 MMOL/L (ref 135–145)
T4 FREE SERPL-MCNC: 1.14 NG/DL (ref 0.53–1.43)
TRIGL SERPL-MCNC: 87 MG/DL (ref 0–149)
TSH SERPL DL<=0.005 MIU/L-ACNC: 2.74 UIU/ML (ref 0.38–5.33)

## 2019-01-18 PROCEDURE — 80053 COMPREHEN METABOLIC PANEL: CPT

## 2019-01-18 PROCEDURE — 36415 COLL VENOUS BLD VENIPUNCTURE: CPT

## 2019-01-18 PROCEDURE — 82306 VITAMIN D 25 HYDROXY: CPT

## 2019-01-18 PROCEDURE — 80061 LIPID PANEL: CPT

## 2019-01-18 PROCEDURE — 84439 ASSAY OF FREE THYROXINE: CPT

## 2019-01-18 PROCEDURE — 84443 ASSAY THYROID STIM HORMONE: CPT

## 2019-01-25 ENCOUNTER — HOSPITAL ENCOUNTER (OUTPATIENT)
Dept: RADIOLOGY | Facility: MEDICAL CENTER | Age: 64
End: 2019-01-25
Attending: INTERNAL MEDICINE
Payer: COMMERCIAL

## 2019-01-25 ENCOUNTER — TELEPHONE (OUTPATIENT)
Dept: CARDIOLOGY | Facility: MEDICAL CENTER | Age: 64
End: 2019-01-25

## 2019-01-25 DIAGNOSIS — E78.2 MIXED HYPERLIPIDEMIA: ICD-10-CM

## 2019-01-25 DIAGNOSIS — Z91.89 OTHER SPECIFIED PERSONAL RISK FACTORS, NOT ELSEWHERE CLASSIFIED: ICD-10-CM

## 2019-01-25 DIAGNOSIS — R91.8 LUNG MASS: ICD-10-CM

## 2019-01-25 PROCEDURE — 4410556 CT-CARDIAC SCORING

## 2019-01-26 NOTE — TELEPHONE ENCOUNTER
WILLI Acevedo/Brooklyn Goldstein (radiologist) at [facility] is calling about cardiac scoring for the patient. She said something showed up on the test and she needs to discuss it with Dr. Taveras as soon as possible. She will be at [phone] for another hour.      Called Dr Goldstein back and provided Dr Minh Taveras personal number. Dr Taveras notified.

## 2019-01-26 NOTE — TELEPHONE ENCOUNTER
Per Dr Taveras:     Please have pt get a CT of Chest w/ Contrast and Urgent referral to Pulmonary for Lung Mass.       Called pt, discussed CT and Dr Taveras recommendations, pt agreed and verbalizes understanding, imaging scheduling phone number [phone] provided to pt.     CT Chest w/ Contrast ordered.     Urgent Referral to Pulm initiated

## 2019-01-26 NOTE — TELEPHONE ENCOUNTER
Pt's wife (Maryellen) called back she was called by her  and just need some clarification about the result, discussed CT Cardiac scoring result and Dr Taveras recommendations, wife appreciative of explanation and verbalizes understanding

## 2019-01-28 ENCOUNTER — TELEPHONE (OUTPATIENT)
Dept: CARDIOLOGY | Facility: MEDICAL CENTER | Age: 64
End: 2019-01-28

## 2019-01-28 NOTE — TELEPHONE ENCOUNTER
CT-CARDIAC SCORING   Notes recorded by Minh Taveras M.D. on 1/28/2019 at 10:05 AM PST  Minimal coronary calcifications.  On the basis of this, I would recommend keeping his LDL cholesterol less than or equal to 130 mg/dL.     Called pt and notified, pt verbalizes understanding

## 2019-01-29 ENCOUNTER — HOSPITAL ENCOUNTER (OUTPATIENT)
Dept: RADIOLOGY | Facility: MEDICAL CENTER | Age: 64
End: 2019-01-29
Attending: INTERNAL MEDICINE
Payer: COMMERCIAL

## 2019-01-29 DIAGNOSIS — R91.8 LUNG MASS: ICD-10-CM

## 2019-01-29 PROCEDURE — 700117 HCHG RX CONTRAST REV CODE 255: Performed by: INTERNAL MEDICINE

## 2019-01-29 PROCEDURE — 71260 CT THORAX DX C+: CPT

## 2019-01-29 RX ADMIN — IOHEXOL 100 ML: 350 INJECTION, SOLUTION INTRAVENOUS at 13:29

## 2019-01-30 ENCOUNTER — TELEPHONE (OUTPATIENT)
Dept: CARDIOLOGY | Facility: MEDICAL CENTER | Age: 64
End: 2019-01-30

## 2019-01-30 DIAGNOSIS — R91.8 LUNG MASS: ICD-10-CM

## 2019-01-30 NOTE — TELEPHONE ENCOUNTER
CT-CHEST (THORAX) WITH   Notes recorded by Minh Taveras M.D. on 1/30/2019 at 12:50 PM PST  Please refer to interventional radiology for biopsy in addition to the previously ordered urgent pulmonary referral.     Called pt, discussed CT result and Dr Taveras recommendations, pt agreed and verbalizes understanding, imaging scheduling phone number [phone] and advise pt to please call [facility], pt appreciative.     IR-Lung Biopsy ordered

## 2019-02-01 NOTE — TELEPHONE ENCOUNTER
WILLI Licona,   Which fax should I send the IR Scheduling form to for completion? I have changed the ancillary order (correct procedure code entered) and have sent a cosign to Dr. Taveras for this patients lung biopsy.   Citlaly      IR scheduling form received, completed and faxed back to [facility], F#[phone].

## 2019-02-05 ENCOUNTER — APPOINTMENT (OUTPATIENT)
Dept: ADMISSIONS | Facility: MEDICAL CENTER | Age: 64
End: 2019-02-05
Attending: INTERNAL MEDICINE
Payer: COMMERCIAL

## 2019-02-05 RX ORDER — MAGNESIUM OXIDE 400 MG/1
400 TABLET ORAL DAILY
COMMUNITY
End: 2022-01-01

## 2019-02-05 RX ORDER — PLANT STANOL ESTER 450 MG
1 TABLET ORAL 2 TIMES DAILY
COMMUNITY
End: 2020-02-06 | Stop reason: SDUPTHER

## 2019-02-06 ENCOUNTER — OFFICE VISIT (OUTPATIENT)
Dept: MEDICAL GROUP | Facility: MEDICAL CENTER | Age: 64
End: 2019-02-06
Payer: COMMERCIAL

## 2019-02-06 VITALS
WEIGHT: 174.82 LBS | HEIGHT: 69 IN | OXYGEN SATURATION: 94 % | HEART RATE: 53 BPM | SYSTOLIC BLOOD PRESSURE: 122 MMHG | DIASTOLIC BLOOD PRESSURE: 82 MMHG | BODY MASS INDEX: 25.89 KG/M2 | TEMPERATURE: 98.8 F

## 2019-02-06 DIAGNOSIS — E78.2 MIXED HYPERLIPIDEMIA: ICD-10-CM

## 2019-02-06 DIAGNOSIS — F51.01 PRIMARY INSOMNIA: Primary | ICD-10-CM

## 2019-02-06 DIAGNOSIS — Z98.890 S/P ABLATION OF ATRIAL FLUTTER: ICD-10-CM

## 2019-02-06 DIAGNOSIS — J94.8 PLEURAL MASS: ICD-10-CM

## 2019-02-06 DIAGNOSIS — I10 ESSENTIAL HYPERTENSION: ICD-10-CM

## 2019-02-06 DIAGNOSIS — Z86.79 S/P ABLATION OF ATRIAL FLUTTER: ICD-10-CM

## 2019-02-06 PROCEDURE — 99214 OFFICE O/P EST MOD 30 MIN: CPT | Performed by: INTERNAL MEDICINE

## 2019-02-06 RX ORDER — ZOLPIDEM TARTRATE 10 MG/1
10 TABLET ORAL
Qty: 30 TAB | Refills: 2 | Status: SHIPPED | OUTPATIENT
Start: 2019-02-06 | End: 2019-08-16 | Stop reason: SDUPTHER

## 2019-02-06 NOTE — PROGRESS NOTES
CHIEF COMPLAINT  Lung mass    HPI  Patient is a 63 y.o. male patient who presents today for the following     Hypertension, St post ablation for aflutter  Pleural lingual mass incidental finding  The patient is 63-year-old, male, with history of hypertension and ablation for atrial flutter.    Meds: Lisinopril 5 mg daily, metoprolol 25 mg daily, taking as prescribed.   Measuring BP at home: no.  Denies: - headaches, vision problems, tinnitus.  - chest pain/pressure, palpitations, irregular heart beats, exertional, dyspnea, peripheral edema.              - medication side effect: unusual fatigue, slow heartbeat, foot/leg swelling, cough.  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    Hyperlipidemia  The patient had abnormal lipid panel, no medications.  Diet / Exercise/BMI: As above  FH: unknown     Insomnia  The patient has trouble to go and stay asleep, used Ambien, needs a refill.  Discussed sleep hygiene:   - Go to bed and wake up at consistent times whether work/school day or not.   - Keep room dark, quiet, and comfortable.   - Don't have naps.  - Increase exposure to sunlight during awake times and avoid bright lights before going to sleep.   - Avoid stimulant or caffeine use more than 4 hours after wake time.   - Avoid meal or exercise 2-3 hours prior to going to bed.    Reviewed PMH, PSH, FH, SH, ALL, HCM/IMM, no changes  Reviewed MEDS, no changes    Patient Active Problem  List    Diagnosis Date Noted   • S/P ablation of atrial flutter 01/10/2019   • Statin intolerance 01/10/2019   • Typical atrial flutter (HCC) 01/09/2019   • Controlled substance agreement signed 01/03/2019   • Sleep disorder 01/03/2019   • Health care maintenance 01/03/2019   • Depression, reactive 10/18/2011   • Tobacco abuse disorder 10/18/2011   • Vitamin D deficiency 06/04/2010   • Displacement of lumbar intervertebral disc without myelopathy    • Essential hypertension    • Mixed hyperlipidemia    • Esophageal reflux    • Disturbance in sleep behavior    • Palpitations    • Degenerative disc disease      CURRENT MEDICATIONS  Current Outpatient Prescriptions   Medication Sig Dispense Refill   • zolpidem (AMBIEN) 10 MG Tab Take 1 Tab by mouth every bedtime for 90 days. 30 Tab 2   • tramadol (ULTRAM) 50 MG Tab Take  mg by mouth 3 times a day.     • magnesium oxide (MAG-OX) 400 MG Tab Take 400 mg by mouth every day.     • NON SPECIFIED Hill and Smooth Enzyme Formula, daily     • Potassium Gluconate 550 (90 K) MG Tab Take 1 Tab by mouth 2 Times a Day.     • Calcium-Magnesium-Vitamin D (CALCIUM 1200+D3 PO) Take 1 Cap by mouth 2 Times a Day.     • Zinc 50 MG Cap Take 50 mg by mouth 2 Times a Day.     • NON SPECIFIED Healthy CuraMed, twice a day     • traZODone (DESYREL) 100 MG Tab Take 1 Tab by mouth every bedtime. 30 Tab 2   • levothyroxine (SYNTHROID) 88 MCG Tab Take 88 mcg by mouth Every morning on an empty stomach.  10   • lisinopril (PRINIVIL) 5 MG Tab Take 5 mg by mouth 2 times a day.  11   • metoprolol (LOPRESSOR) 25 MG Tab Take 25 mg by mouth 2 times a day.  10   • tamsulosin (FLOMAX) 0.4 MG capsule Take 0.4 mg by mouth every bedtime.  23     No current facility-administered medications for this visit.      ALLERGIES  Allergies: Atorvastatin  PAST MEDICAL HISTORY  Past Medical History:   Diagnosis Date   • Depression, reactive 10/18/2011   • Tobacco abuse disorder 10/18/2011   • Fatigue 6/4/2010   •  Unspecified vitamin D deficiency 2010   • ED (erectile dysfunction) 2010   • Arrhythmia    • Arthritis     hands   • Degenerative disc disease     L4-5, L5-S1 most severe   • Depression    • Displacement of lumbar intervertebral disc without myelopathy     uncontrolled on Right   • Esophageal reflux     uncontrolled   • Indigestion    • Mixed hyperlipidemia     uncontrolled  Last test: 2007:216/183/52/127   • Pain     lower back 4/10   • Palpitations     uncontrolled   • Sleep apnea    • Sleep disturbance, unspecified     uncontrolled   • Unspecified essential hypertension     Controlled     SURGICAL HISTORY  He  has a past surgical history that includes lumbar fusion posterior (9/15/2010); lumbar fusion posterior (10/18/2011); lumbar decompression (10/18/2011); and hardware removal neuro (10/18/2011).  SOCIAL HISTORY  Social History   Substance Use Topics   • Smoking status: Never Smoker   • Smokeless tobacco: Current User     Types: Chew      Comment: 1 can every 2 days for 30 years ()   • Alcohol use No     Social History     Social History Narrative   • No narrative on file     FAMILY HISTORY  Family History   Problem Relation Age of Onset   • Hypertension Mother    • Arthritis Mother    • Heart Disease Mother         atrial fibrillation   • Diabetes Father    • Cancer Father         PROSTATE CA   • Heart Disease Father         CAD   • Hypertension Father      Family Status   Relation Status   • Mo         heart related death   • Fa         complications from prostate CA     ROS   Constitutional: Negative for fever, chills, anorexia, weight loss.  HENT: Negative for congestion, sore throat.  Eyes: Negative for blurred vision.   Respiratory: As above.  Cardiovascular: As above.  Gastrointestinal: Negative for heartburn, abdominal pain.   Genitourinary: Negative for dysuria.  Musculoskeletal: Negative for back pain.   Skin: Negative for rash and itching.   Neuro: Negative for  "dizziness, headaches.   Endo/Heme/Allergies: Does not bruise/bleed easily.   Psychiatric/Behavioral: Negative for depression.    PHYSICAL EXAM   /82 (BP Location: Left arm, Patient Position: Sitting, BP Cuff Size: Adult)   Pulse (!) 53   Temp 37.1 °C (98.8 °F) (Temporal)   Ht 1.753 m (5' 9\")   Wt 79.3 kg (174 lb 13.2 oz)   SpO2 94%   BMI 25.82 kg/m²   General:  NAD, well appearing  HEENT:   NC/AT, PERRLA, EOMI, TMs are clear. Oropharyngeal mucosa is pink,  without lesions;  no cervical / supraclavicular  lymphadenopathy, no thyromegaly.    Cardiovascular: RRR.   No m/r/g. No carotid bruits .      Lungs:   CTAB, no w/r/r, no respiratory distress.  Abdomen: Soft, NT/ND.  Extremities:  2+ DP and radial pulses bilaterally.  No c/c/e.   Skin:  Warm, dry.  No erythema. No rash.   Neurologic: Alert & oriented x 3. CN II-XII grossly intact.No focal deficits.  Psychiatric:  Affect normal, mood normal, judgment normal.    LABS     Labs are reviewed and discussed with a patient  Lab Results   Component Value Date/Time    CHOLSTRLTOT 185 01/18/2019 07:10 AM     (H) 01/18/2019 07:10 AM    HDL 36 (A) 01/18/2019 07:10 AM    TRIGLYCERIDE 87 01/18/2019 07:10 AM       Lab Results   Component Value Date/Time    SODIUM 139 01/18/2019 07:10 AM    POTASSIUM 4.1 01/18/2019 07:10 AM    CHLORIDE 105 01/18/2019 07:10 AM    CO2 26 01/18/2019 07:10 AM    GLUCOSE 98 01/18/2019 07:10 AM    BUN 16 01/18/2019 07:10 AM    CREATININE 1.12 01/18/2019 07:10 AM    CREATININE 1.2 05/12/2009 03:01 PM     Lab Results   Component Value Date/Time    ALKPHOSPHAT 85 01/18/2019 07:10 AM    ASTSGOT 16 01/18/2019 07:10 AM    ALTSGPT 9 01/18/2019 07:10 AM    TBILIRUBIN 0.5 01/18/2019 07:10 AM      Lab Results   Component Value Date/Time    FREET4 1.14 01/18/2019 07:10 AM    FREET4 1.06 06/07/2010 08:45 AM     Lab Results   Component Value Date/Time    WBC 7.8 05/18/2018 06:14 AM    RBC 5.26 05/18/2018 06:14 AM    HEMOGLOBIN 15.7 05/18/2018 " 06:14 AM    HEMATOCRIT 47.5 05/18/2018 06:14 AM    MCV 90.3 05/18/2018 06:14 AM    MCH 29.8 05/18/2018 06:14 AM    MCHC 33.1 (L) 05/18/2018 06:14 AM    MPV 9.0 05/18/2018 06:14 AM    NEUTSPOLYS 69.10 05/18/2018 06:14 AM    LYMPHOCYTES 19.00 (L) 05/18/2018 06:14 AM    MONOCYTES 8.60 05/18/2018 06:14 AM    EOSINOPHILS 1.90 05/18/2018 06:14 AM    BASOPHILS 1.00 05/18/2018 06:14 AM    HYPOCHROMIA 1+ 01/23/2012 10:41 AM      IMAGING     Mood and discussed following imaging:    CT chest, 1/29/19:  1. A 5 cm lingular/pleural mass, concerning for primary malignancy. It is easily accessible for percutaneous biopsy.  2. Innumerable left pleural metastases and associated small malignant left pleural effusion.  3. Mildly enlarged left hilar and left lower paratracheal nodes, most likely metastatic. Prominent paraesophageal nodes may be metastatic as well.  4. Right lung is clear.  5. No evidence of osseous metastatic disease.     CT cardiac score, 1/25/19:  1.  Calcium Score of 1-99 AND <75th percentile:  2. There is nodular pleural thickening throughout the left hemithorax on both the mediastinal and peripheral pleural surfaces. The appearance is suspicious for possible primary malignant process or metastatic malignant process. CT of the chest with   contrast may be of benefit for further evaluation.  She is new patient    ASSESMENT AND PLAN        1. Essential hypertension  Controlled, continue current treatment    2. S/P ablation of atrial flutter  Stable, continue current treatment and cardiology follow-up    3. Pleural mass  Advised to proceed with further workup    4. Mixed hyperlipidemia  Controlled, continue current treatment    5. Primary insomnia  Controlled, refill:  - zolpidem (AMBIEN) 10 MG Tab; Take 1 Tab by mouth every bedtime for 90 days.  Dispense: 30 Tab; Refill: 2    Counseling:   - Smoking:  Nonsmoker    Followup: within 3 months    All questions are answered.    Please note that this dictation was created  using voice recognition software, and that there might be errors of jerrica and possibly content.

## 2019-02-08 ENCOUNTER — APPOINTMENT (OUTPATIENT)
Dept: RADIOLOGY | Facility: MEDICAL CENTER | Age: 64
End: 2019-02-08
Attending: INTERNAL MEDICINE
Payer: COMMERCIAL

## 2019-02-08 ENCOUNTER — APPOINTMENT (OUTPATIENT)
Dept: RADIOLOGY | Facility: MEDICAL CENTER | Age: 64
End: 2019-02-08
Attending: RADIOLOGY
Payer: COMMERCIAL

## 2019-02-08 ENCOUNTER — HOSPITAL ENCOUNTER (OUTPATIENT)
Facility: MEDICAL CENTER | Age: 64
End: 2019-02-08
Attending: INTERNAL MEDICINE | Admitting: INTERNAL MEDICINE
Payer: COMMERCIAL

## 2019-02-08 VITALS
DIASTOLIC BLOOD PRESSURE: 86 MMHG | SYSTOLIC BLOOD PRESSURE: 129 MMHG | OXYGEN SATURATION: 92 % | TEMPERATURE: 99 F | WEIGHT: 171.3 LBS | HEIGHT: 69 IN | BODY MASS INDEX: 25.37 KG/M2 | RESPIRATION RATE: 18 BRPM | HEART RATE: 57 BPM

## 2019-02-08 DIAGNOSIS — R91.8 LUNG MASS: ICD-10-CM

## 2019-02-08 LAB
INR PPP: 1.08 (ref 0.87–1.13)
PATHOLOGY CONSULT NOTE: NORMAL
PLATELET # BLD AUTO: 278 K/UL (ref 164–446)
PROTHROMBIN TIME: 14.1 SEC (ref 12–14.6)

## 2019-02-08 PROCEDURE — 85610 PROTHROMBIN TIME: CPT

## 2019-02-08 PROCEDURE — 88313 SPECIAL STAINS GROUP 2: CPT

## 2019-02-08 PROCEDURE — 700111 HCHG RX REV CODE 636 W/ 250 OVERRIDE (IP)

## 2019-02-08 PROCEDURE — 88305 TISSUE EXAM BY PATHOLOGIST: CPT

## 2019-02-08 PROCEDURE — 700111 HCHG RX REV CODE 636 W/ 250 OVERRIDE (IP): Performed by: RADIOLOGY

## 2019-02-08 PROCEDURE — 71045 X-RAY EXAM CHEST 1 VIEW: CPT

## 2019-02-08 PROCEDURE — 88342 IMHCHEM/IMCYTCHM 1ST ANTB: CPT

## 2019-02-08 PROCEDURE — 160002 HCHG RECOVERY MINUTES (STAT)

## 2019-02-08 PROCEDURE — 99153 MOD SED SAME PHYS/QHP EA: CPT

## 2019-02-08 PROCEDURE — 88341 IMHCHEM/IMCYTCHM EA ADD ANTB: CPT

## 2019-02-08 PROCEDURE — 85049 AUTOMATED PLATELET COUNT: CPT

## 2019-02-08 PROCEDURE — 88360 TUMOR IMMUNOHISTOCHEM/MANUAL: CPT

## 2019-02-08 PROCEDURE — 88341 IMHCHEM/IMCYTCHM EA ADD ANTB: CPT | Mod: 91

## 2019-02-08 PROCEDURE — 88323 CONSLTJ&REPRT MATRL PREP SLD: CPT

## 2019-02-08 RX ORDER — ONDANSETRON 2 MG/ML
4 INJECTION INTRAMUSCULAR; INTRAVENOUS EVERY 8 HOURS PRN
Status: DISCONTINUED | OUTPATIENT
Start: 2019-02-08 | End: 2019-02-08 | Stop reason: HOSPADM

## 2019-02-08 RX ORDER — SODIUM CHLORIDE 9 MG/ML
INJECTION, SOLUTION INTRAVENOUS CONTINUOUS
Status: DISCONTINUED | OUTPATIENT
Start: 2019-02-08 | End: 2019-02-08 | Stop reason: HOSPADM

## 2019-02-08 RX ORDER — OXYCODONE HYDROCHLORIDE 10 MG/1
10 TABLET ORAL
Status: DISCONTINUED | OUTPATIENT
Start: 2019-02-08 | End: 2019-02-08 | Stop reason: HOSPADM

## 2019-02-08 RX ORDER — SODIUM CHLORIDE 9 MG/ML
500 INJECTION, SOLUTION INTRAVENOUS
Status: DISCONTINUED | OUTPATIENT
Start: 2019-02-08 | End: 2019-02-08 | Stop reason: HOSPADM

## 2019-02-08 RX ORDER — NALOXONE HYDROCHLORIDE 0.4 MG/ML
INJECTION, SOLUTION INTRAMUSCULAR; INTRAVENOUS; SUBCUTANEOUS
Status: DISCONTINUED
Start: 2019-02-08 | End: 2019-02-08 | Stop reason: HOSPADM

## 2019-02-08 RX ORDER — ONDANSETRON 2 MG/ML
4 INJECTION INTRAMUSCULAR; INTRAVENOUS PRN
Status: DISCONTINUED | OUTPATIENT
Start: 2019-02-08 | End: 2019-02-08 | Stop reason: HOSPADM

## 2019-02-08 RX ORDER — MIDAZOLAM HYDROCHLORIDE 1 MG/ML
.5-2 INJECTION INTRAMUSCULAR; INTRAVENOUS PRN
Status: DISCONTINUED | OUTPATIENT
Start: 2019-02-08 | End: 2019-02-08 | Stop reason: HOSPADM

## 2019-02-08 RX ORDER — MORPHINE SULFATE 4 MG/ML
4 INJECTION, SOLUTION INTRAMUSCULAR; INTRAVENOUS
Status: DISCONTINUED | OUTPATIENT
Start: 2019-02-08 | End: 2019-02-08 | Stop reason: HOSPADM

## 2019-02-08 RX ORDER — OXYCODONE HYDROCHLORIDE 5 MG/1
5 TABLET ORAL
Status: DISCONTINUED | OUTPATIENT
Start: 2019-02-08 | End: 2019-02-08 | Stop reason: HOSPADM

## 2019-02-08 RX ORDER — MIDAZOLAM HYDROCHLORIDE 1 MG/ML
INJECTION INTRAMUSCULAR; INTRAVENOUS
Status: COMPLETED
Start: 2019-02-08 | End: 2019-02-08

## 2019-02-08 RX ORDER — MIDAZOLAM HYDROCHLORIDE 1 MG/ML
.5-2 INJECTION INTRAMUSCULAR; INTRAVENOUS PRN
Status: DISCONTINUED | OUTPATIENT
Start: 2019-02-08 | End: 2019-02-08

## 2019-02-08 RX ORDER — LIDOCAINE HYDROCHLORIDE 10 MG/ML
INJECTION, SOLUTION INFILTRATION; PERINEURAL
Status: DISCONTINUED
Start: 2019-02-08 | End: 2019-02-08 | Stop reason: HOSPADM

## 2019-02-08 RX ADMIN — MIDAZOLAM 1 MG: 1 INJECTION INTRAMUSCULAR; INTRAVENOUS at 11:20

## 2019-02-08 RX ADMIN — MIDAZOLAM 2 MG: 1 INJECTION INTRAMUSCULAR; INTRAVENOUS at 11:25

## 2019-02-08 RX ADMIN — FENTANYL CITRATE 50 MCG: 50 INJECTION, SOLUTION INTRAMUSCULAR; INTRAVENOUS at 11:20

## 2019-02-08 RX ADMIN — SODIUM CHLORIDE: 9 INJECTION, SOLUTION INTRAVENOUS at 09:07

## 2019-02-08 RX ADMIN — MIDAZOLAM 1 MG: 1 INJECTION INTRAMUSCULAR; INTRAVENOUS at 11:22

## 2019-02-08 NOTE — OR SURGEON
Immediate Post- Operative Note        PostOp Diagnosis: L lung mass.       Procedure(s): CT guided biopsy.       Estimated Blood Loss: Less than 5 ml        Complications: None            2/8/2019     11:44 AM     Yury Garcia

## 2019-02-08 NOTE — DISCHARGE INSTRUCTIONS
ACTIVITY: Rest and take it easy for the first 24 hours.  A responsible adult is recommended to remain with you during that time.  It is normal to feel sleepy.  We encourage you to not do anything that requires balance, judgment or coordination.    MILD FLU-LIKE SYMPTOMS ARE NORMAL. YOU MAY EXPERIENCE GENERALIZED MUSCLE ACHES, THROAT IRRITATION, HEADACHE AND/OR SOME NAUSEA.    FOR 24 HOURS DO NOT:  Drive, operate machinery or run household appliances.  Drink beer or alcoholic beverages.   Make important decisions or sign legal documents.    SPECIAL INSTRUCTIONS: keep incision dry for 24 hours    DIET: To avoid nausea, slowly advance diet as tolerated, avoiding spicy or greasy foods for the first day.  Add more substantial food to your diet according to your physician's instructions.  Babies can be fed formula or breast milk as soon as they are hungry.  INCREASE FLUIDS AND FIBER TO AVOID CONSTIPATION.    SURGICAL DRESSING/BATHING: do not shower for 24 hours, may shower tomorrow 2/9/2019 after 12pm    FOLLOW-UP APPOINTMENT:  A follow-up appointment should be arranged with your doctor in ***; call to schedule.    You should CALL YOUR PHYSICIAN if you develop:  Fever greater than 101 degrees F.  Pain not relieved by medication, or persistent nausea or vomiting.  Excessive bleeding (blood soaking through dressing) or unexpected drainage from the wound.  Extreme redness or swelling around the incision site, drainage of pus or foul smelling drainage.  Inability to urinate or empty your bladder within 8 hours.  Problems with breathing or chest pain.    You should call 911 if you develop problems with breathing or chest pain.  If you are unable to contact your doctor or surgical center, you should go to the nearest emergency room or urgent care center.  Physician's telephone #: ***    If any questions arise, call your doctor.  If your doctor is not available, please feel free to call the Surgical Center at [phone].   The Center is open Monday through Friday from 7AM to 7PM.  You can also call the HEALTH HOTLINE open 24 hours/day, 7 days/week and speak to a nurse at [phone], or toll free at [phone].    A registered nurse may call you a few days after your surgery to see how you are doing after your procedure.    MEDICATIONS: Resume taking daily medication.  Take prescribed pain medication with food.  If no medication is prescribed, you may take non-aspirin pain medication if needed.  PAIN MEDICATION CAN BE VERY CONSTIPATING.  Take a stool softener or laxative such as senokot, pericolace, or milk of magnesia if needed.    If your physician has prescribed pain medication that includes Acetaminophen (Tylenol), do not take additional Acetaminophen (Tylenol) while taking the prescribed medication.    Depression / Suicide Risk    As you are discharged from this [facility] facility, it is important to learn how to keep safe from harming yourself.    Recognize the warning signs:  · Abrupt changes in personality, positive or negative- including increase in energy   · Giving away possessions  · Change in eating patterns- significant weight changes-  positive or negative  · Change in sleeping patterns- unable to sleep or sleeping all the time   · Unwillingness or inability to communicate  · Depression  · Unusual sadness, discouragement and loneliness  · Talk of wanting to die  · Neglect of personal appearance   · Rebelliousness- reckless behavior  · Withdrawal from people/activities they love  · Confusion- inability to concentrate     If you or a loved one observes any of these behaviors or has concerns about self-harm, here's what you can do:  · Talk about it- your feelings and reasons for harming yourself  · Remove any means that you might use to hurt yourself (examples: pills, rope, extension cords, firearm)  · Get professional help from the community (Mental Health, Substance Abuse, psychological counseling)  · Do not  be alone:Call your Safe Contact- someone whom you trust who will be there for you.  · Call your local CRISIS HOTLINE 815-0479 or 674-115-8235  · Call your local Children's Mobile Crisis Response Team [place] (878) 163-7949 or www.babbel  · Call the toll free National Suicide Prevention Hotlines   · National Suicide Prevention Lifeline 884-261-XRMI (8798)  · [employer] 800-SUICIDE (297-9064)    Lung Biopsy  A lung biopsy is a procedure in which a tissue sample is removed from the lung. The tissue can be examined under a microscope to help diagnose various lung disorders.   LET YOUR HEALTH CARE PROVIDER KNOW ABOUT:  · Any allergies you have.  · All medicines you are taking, including vitamins, herbs, eye drops, creams, and over-the-counter medicines.  · Previous problems you or members of your family have had with the use of anesthetics.  · Any blood disorders or bleeding problems that you have.  · Previous surgeries you have had.  · Medical conditions you have.  RISKS AND COMPLICATIONS  Generally, a lung biopsy is a safe procedure. However, problems can occur and include:  · Collapse of the lung.    · Bleeding.    · Infection.    BEFORE THE PROCEDURE  · Do not eat or drink anything after midnight on the night before the procedure or as directed by your health care provider.  · Ask your health care provider about changing or stopping your regular medicines. This is especially important if you are taking diabetes medicines or blood thinners.  · Plan to have someone take you home after the procedure.  PROCEDURE  Various methods can be used to perform a lung biopsy:   · Needle biopsy. A biopsy needle is inserted into the lung. The needle is used to collect the tissue sample. A CT scanner may be used to guide the needle to the right place in the lung. For this method, a medicine is used to numb the area where the biopsy sample will be taken (local anesthetic).  · Bronchoscopy. A flexible  tube (bronchoscope) is inserted into your lungs by going through your mouth or nose. A needle or forceps is passed through the bronchoscope to remove the tissue sample. For this method, medicine may be used to numb the back of your throat.  · Open biopsy. A cut (incision) is made in your chest. The tissue sample is then removed using surgical tools. The incision is closed with skin glue, skin adhesive strips, or stitches. For this method, you will be given medicine to make you sleep through the procedure (general anesthetic).  AFTER THE PROCEDURE  · Your recovery will be assessed and monitored.  · You might have soreness and tenderness at the site of the biopsy for a few days after the procedure.  · You might have a cough and some soreness in your throat for a few days if a bronchoscope was used.  This information is not intended to replace advice given to you by your health care provider. Make sure you discuss any questions you have with your health care provider.  Document Released: 03/08/2006 Document Revised: 01/08/2016 Document Reviewed: 06/01/2014  [employer] Interactive Patient Education © 2017 [employer]

## 2019-02-08 NOTE — OR NURSING
1150 Pt report received from IR nurse, pt transported over on [facility] with RN. Pt placed on monitor and left upper chest incision site reviewed with IR nurse site is clean, dry and soft, no S/S of bleeding. Instructed pt that there will be a follow up chest x-ray, if clear and no pneumothorax then pt may ave something to eat and drink.      1321 follow up chest x-ray done    1430 results of chest X-ray showed no pneumothorax, pt was given water and a snack.     1450 second follow chest X-ray done    1510 second follow up X-ray showed no pneumothorax. Pt ok to be discharged home.     1533 Pt given discharge instructions, pt and family verbalized understanding. Pt going home with family, refused wheelchair.

## 2019-02-08 NOTE — PROGRESS NOTES
IR Procedure Note:    Dr. Garcia consented patient in PPU prior to procedure; all questions answered.    Site confirmed with imaging by patient, physician, RT, and RN.     Dr. Garcia completed left lung biopsy.  The patient tolerated the procedure well; ETCo2 baseline 32-35, with consistent waveform during the procedure.      Gauze and tegaderm applied to left upper chest, CDI and soft; pressure held x 3 minutes.  Cores x 3 collected and taken to lab.  Patient tolerated procedure well with periods of sleep.  Patient alert, oriented and verbally appropriate post procedure, vital signs stable during procedure and transport, see flow sheet for vital signs.  Report given to JULIENNE Garcia.  RN transported patient to PPU and patient attached to bedside monitor and VSS.  Patient handoff to JULIENNE Garcia.

## 2019-02-13 ENCOUNTER — TELEPHONE (OUTPATIENT)
Dept: MEDICAL GROUP | Facility: MEDICAL CENTER | Age: 64
End: 2019-02-13

## 2019-02-13 NOTE — TELEPHONE ENCOUNTER
VOICEMAIL  1. Caller Name: [facility]                         Call Back Number: [phone] ext [phone]    2. Message: [facility] called stating the needed a surgery clearance done for the patient. I have talked to Dr. Rangel as Dr. Craig is out to the office. And she stated to call the office and let them know to have Dr. Taveras do it who is the patient Cardiology docotor. I have called back [facility] office and have left them a message about the following information that Dr. Rangel has told me. And have told them to call us back if they have any questions.     3. Patient approves office to leave a detailed voicemail/MyChart message: yes

## 2019-02-14 ENCOUNTER — TELEPHONE (OUTPATIENT)
Dept: CARDIOLOGY | Facility: MEDICAL CENTER | Age: 64
End: 2019-02-14

## 2019-02-14 DIAGNOSIS — R91.8 LUNG MASS: ICD-10-CM

## 2019-02-14 NOTE — TELEPHONE ENCOUNTER
Mega Reed M.D.   You Just now (3:00 PM)      Referral done,   Thank you,   Dr Craig (Routing comment)        Noted.

## 2019-02-14 NOTE — TELEPHONE ENCOUNTER
[place], I just found out that this patient need the referral to Pulmonology to be entered/ordered by the PCP. He does have an appointment scheduled for 02/25/2019 but insurance will deny if PCP does not order it.       Message forwarded to Dr Micky Mckeon's PCP.

## 2019-02-17 ASSESSMENT — ENCOUNTER SYMPTOMS
RESPIRATORY SYMPTOMS COMMENTS: NO
HEMOPTYSIS: 0
CHEST TIGHTNESS: 0
WHEEZING: 0
DYSPNEA AT REST: 0
SHORTNESS OF BREATH: 0

## 2019-02-20 ENCOUNTER — TELEPHONE (OUTPATIENT)
Dept: MEDICAL GROUP | Facility: MEDICAL CENTER | Age: 64
End: 2019-02-20

## 2019-02-20 NOTE — TELEPHONE ENCOUNTER
Phone Number Called: [phone]    Message: LM for H 3 times to get information on referral that was sent 02/12/19. Still have not gotten a returned call. Also left two messages with Dr. Linder office to let them know the status.     Left Message for patient to call back: N\A

## 2019-02-20 NOTE — TELEPHONE ENCOUNTER
Phone Number Called: [phone]    Message: Spoke with [facility] in regards to referral. States that the referral Is still pending. States it can take up to 14 days and may not be approved until the 25th. Did inform them that his surgery is on the 28th. States to call back Friday to check the status    Left Message for patient to call back: no

## 2019-02-25 ENCOUNTER — NON-PROVIDER VISIT (OUTPATIENT)
Dept: PULMONOLOGY | Facility: HOSPICE | Age: 64
End: 2019-02-25
Payer: COMMERCIAL

## 2019-02-25 ENCOUNTER — HOSPITAL ENCOUNTER (OUTPATIENT)
Dept: LAB | Facility: MEDICAL CENTER | Age: 64
End: 2019-02-25
Attending: INTERNAL MEDICINE
Payer: COMMERCIAL

## 2019-02-25 ENCOUNTER — OFFICE VISIT (OUTPATIENT)
Dept: PULMONOLOGY | Facility: HOSPICE | Age: 64
End: 2019-02-25
Payer: COMMERCIAL

## 2019-02-25 VITALS
DIASTOLIC BLOOD PRESSURE: 74 MMHG | BODY MASS INDEX: 25.86 KG/M2 | OXYGEN SATURATION: 94 % | SYSTOLIC BLOOD PRESSURE: 126 MMHG | HEART RATE: 64 BPM | HEIGHT: 69 IN | WEIGHT: 174.6 LBS | RESPIRATION RATE: 16 BRPM | TEMPERATURE: 97.7 F

## 2019-02-25 VITALS — BODY MASS INDEX: 25.7 KG/M2 | WEIGHT: 174 LBS

## 2019-02-25 DIAGNOSIS — R91.8 LUNG MASS: ICD-10-CM

## 2019-02-25 DIAGNOSIS — Z77.090 HISTORY OF ASBESTOS EXPOSURE: ICD-10-CM

## 2019-02-25 DIAGNOSIS — Z78.9 NONSMOKER: ICD-10-CM

## 2019-02-25 PROCEDURE — 36415 COLL VENOUS BLD VENIPUNCTURE: CPT

## 2019-02-25 PROCEDURE — 99244 OFF/OP CNSLTJ NEW/EST MOD 40: CPT | Mod: 25 | Performed by: INTERNAL MEDICINE

## 2019-02-25 PROCEDURE — 94726 PLETHYSMOGRAPHY LUNG VOLUMES: CPT | Performed by: INTERNAL MEDICINE

## 2019-02-25 PROCEDURE — 86480 TB TEST CELL IMMUN MEASURE: CPT

## 2019-02-25 PROCEDURE — 94060 EVALUATION OF WHEEZING: CPT | Performed by: INTERNAL MEDICINE

## 2019-02-25 PROCEDURE — 94729 DIFFUSING CAPACITY: CPT | Performed by: INTERNAL MEDICINE

## 2019-02-25 RX ORDER — OXYCODONE AND ACETAMINOPHEN 7.5; 325 MG/1; MG/1
TABLET ORAL
Refills: 0 | COMMUNITY
Start: 2019-02-20 | End: 2019-04-17

## 2019-02-25 ASSESSMENT — PULMONARY FUNCTION TESTS
FVC_PREDICTED: 4.51
FEV1/FVC_PREDICTED: 75
FVC: 3.86
FEV1/FVC: 74.87
FEV1/FVC_PERCENT_LLN: 63
FVC_LLN: 3.77
FEV1_PERCENT_CHANGE: 0
FEV1/FVC_PERCENT_PREDICTED: 100
FVC: 3.86
FEV1_PERCENT_CHANGE: 0
FEV1/FVC_PERCENT_PREDICTED: 99
FEV1: 2.86
FEV1_PREDICTED: 3.38
FEV1_LLN: 2.82
FEV1/FVC_PERCENT_CHANGE: 0
FEV1: 2.89
FEV1/FVC: 74
FEV1/FVC: 74
FEV1/FVC_PERCENT_PREDICTED: 98
FVC_PERCENT_PREDICTED: 85
FEV1/FVC_PERCENT_PREDICTED: 99
FEV1_PERCENT_PREDICTED: 84
FEV1_PERCENT_PREDICTED: 85
FEV1/FVC: 75
FEV1/FVC_PERCENT_PREDICTED: 75
FVC_PERCENT_PREDICTED: 85

## 2019-02-25 NOTE — PROGRESS NOTES
"Chief Complaint   Patient presents with   • Establish Care     Referred by      HPI: This patient is a very pleasant 63-year-old male who is accompanied by his wife, for follow-up of lung mass.  He recently had a cardiac scoring CT scan January 25, 2019 which incidentally showed a lung mass.  Subsequent chest CAT scan with contrast on January 29, 2019 identified a 5 cm left lower pleural based mass, with pleural nodules and small pleural effusion.  There were associated enlarged left hilar lymph nodes.  The patient has had drenching night sweats in recent months.  He denies associated shortness of breath, cough, chest pain or hemoptysis.  He has had a 10 pound weight loss in the past month.  He is a lifelong non-smoker; he chews tobacco.  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  He also had worked around diatomacious earth at [employer].  He denies using a respirator at work.  He denies any past pulmonary history.  He had a CT-guided needle biopsy February 8, 2019 of his lung mass demonstrating \"atypical mesothelial proliferation\".  Final pathology report was pending consultation at [place] pathology department.        HPI: This patient is a Answers for HPI/ROS submitted by the patient on 2/17/2019   What is the reason for your visit today?: Referred by Dr. Taveras based on a CT scan with contrast   Do you cough first thing in the morning or at other times during the day?: No  Do you have a cough on most days? If so, how long have you had this cough?: No  Bring up phlegm (mucus, sputum) in the morning or other times during the day?: No  Do you cough up blood from your chest?: No  Do you experience wheezing?: No  Do you experience any chest tightness?: No  Experience shortness of breath?: No  Experience shortness of breath at rest?: No  How far can you walk before becoming short of breath or need to rest? : None  Please list what " causes you to become short of breath:: None  Have you ever been hospitalized?: Yes  Reason, year, and hospital in which you were hospitalized:: [facility]?  Have you ever needed to be intubated or placed on a ventilator? : No  Have you ever had problems with anesthesia?: No  Have you experienced post-operative delirium?: No  Any complications with surgery?: No  What year did you receive your last Flu shot?: 2017  What year did you receive you last Pneumonia shot?: ?  Have you had a TB skin test? If so, please list the year and result:: I think so?  Have you had Allergy skin testing? If so, please list the year and result:: No  Please list all your occupations from your first job to your current job. 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 Job: Yes  a Mine or Quarry: Yes  a Foundry: No  with Pottery: No  Cotton Mill: No  Flax Mill: No  Hemp Mill: No  Brick Plant: No  with Asbestos: Yes  as a Sandblaster: No  manufacturing of glass, ceramics, or abrasives: No  Please list the places you have lived, the year, and Country or State in the [place]:: [place]  Birds?: No  Dogs?: Yes  Cats?: No  Mice and/or Deer Mice?: No  Reptiles?: No  Blood Chemistries: Yes 2019  Blood Count: Yes 2019  Chest X-ray: Yes 2019  Electrocardiogram: Yes 2018  Sleep Study: Yes  2018  Coffee: 1 cup  Energy drinks: 5 times a week  Carbonated soft drinks: 4 times a week    Conflicting answers have been found for some questions. Please document the patient's answers manually.     Past Medical History:   Diagnosis Date   • Arrhythmia    • Arthritis     hands   • Back pain    • Chickenpox    • Degenerative disc disease     L4-5, L5-S1 most severe   • Depression    • Depression, reactive 10/18/2011   • Displacement of lumbar intervertebral disc without myelopathy     uncontrolled on Right   • ED (erectile dysfunction) 6/4/2010   • Esophageal reflux     uncontrolled   • Fatigue 6/4/2010   •  Indigestion    • Lung cancer (HCC)    • Mixed hyperlipidemia     uncontrolled  Last test: 6/2007:216/183/52/127   • Pain     lower back 4/10   • Palpitations     uncontrolled   • Pleural effusion    • Sleep apnea    • Sleep disturbance, unspecified     uncontrolled   • Tobacco abuse disorder 10/18/2011   • Unspecified essential hypertension     Controlled   • Unspecified vitamin D deficiency 6/4/2010       Social History     Social History   • Marital status:      Spouse name: N/A   • Number of children: N/A   • Years of education: N/A     Occupational History   • Not on file.     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 30 Tab 2   • magnesium oxide (MAG-OX) 400 MG Tab Take 400 mg by mouth every day.     • Potassium Gluconate 550 (90 K) MG Tab Take 1 Tab by mouth 2 Times a Day.     • Calcium-Magnesium-Vitamin D (CALCIUM 1200+D3 PO) Take 1 Cap by mouth 2 Times a Day.     • Zinc 50 MG Cap Take 50 mg by mouth 2 Times a Day.     • tramadol (ULTRAM) 50 MG Tab Take  mg by mouth 3 times a day.     • traZODone (DESYREL) 100 MG Tab Take 1 Tab by mouth every bedtime. 30 Tab 2   •  "levothyroxine (SYNTHROID) 88 MCG Tab Take 88 mcg by mouth Every morning on an empty stomach.  10   • lisinopril (PRINIVIL) 5 MG Tab Take 5 mg by mouth 2 times a day.  11   • metoprolol (LOPRESSOR) 25 MG Tab Take 25 mg by mouth 2 times a day.  10   • tamsulosin (FLOMAX) 0.4 MG capsule Take 0.4 mg by mouth every bedtime.  23   • NON SPECIFIED Hill and Smooth Enzyme Formula, daily     • NON SPECIFIED Healthy CuraMed, twice a day       No current facility-administered medications on file prior to visit.        Allergies: Atorvastatin    ROS:   Constitutional: Denies fevers, chills, +night sweats, +fatigue,+weight loss  Eyes: Denies vision loss, pain, drainage, double vision  Ears, Nose, Throat: Denies earache, difficulty hearing, tinnitus, nasal congestion, hoarseness  Cardiovascular: Denies chest pain, tightness, palpitations, orthopnea or edema  Respiratory: Denies shortness of breath, cough, wheezing, hemoptysis  Sleep: Denies daytime sleepiness, snoring, apneas, insomnia, morning headaches  GI: Denies heartburn, dysphagia, nausea, abdominal pain, diarrhea or constipation  : Denies frequent urination, hematuria, discharge or painful urination  Musculoskeletal: Denies back pain, painful joints, sore muscles  Neurological: Denies weakness or headaches  Skin: No rashes    Blood pressure 126/74, pulse 64, temperature 36.5 °C (97.7 °F), temperature source Temporal, resp. rate 16, height 1.753 m (5' 9\"), weight 79.2 kg (174 lb 9.6 oz), SpO2 94 %.    Physical Exam:  Appearance: Well-nourished, well-developed, in no acute distress  HEENT: Normocephalic, atraumatic, white sclera, PERRLA, oropharynx clear  Neck: No adenopathy or masses  Respiratory: no intercostal retractions or accessory muscle use  Lungs auscultation: Clear to auscultation bilaterally  Cardiovascular: Regular rate rhythm. No murmurs, rubs or gallops.  No LE edema  Abdomen: soft, nondistended  Gait: Normal  Digits: No clubbing, cyanosis  Motor: No focal " deficits  Orientation: Oriented to time, person and place    Diagnosis:  1. Lung mass  PULMONARY FUNCTION TESTS -Test requested: Complete Pulmonary Function Test    QUANTIFERON TB GOLD (IN TUBE)   2. Nonsmoker     3. History of asbestos exposure         Plan:  The patient has a 5 cm left pleural lung mass, concerning for mesothelioma.  The left pleura also show innumerable nodules and ipsilateral hilar adenopathy.  He has a history of asbestos exposure in the past.  Biopsy suggests atypical mesothelial proliferation however final pathology report is pending.  We will obtain pulmonary function testing with DLCO pending final pathology report.  Obtain QuantiFERON TB Gold.  Return in about 2 weeks (around 3/11/2019).      Answers for HPI/ROS submitted by the patient on 2/17/2019   What is the reason for your visit today?: Referred by Dr. Taveras based on a CT scan with contrast   Do you cough first thing in the morning or at other times during the day?: No  Do you have a cough on most days? If so, how long have you had this cough?: No  Bring up phlegm (mucus, sputum) in the morning or other times during the day?: No  Do you cough up blood from your chest?: No  Do you experience wheezing?: No  Do you experience any chest tightness?: No  Experience shortness of breath?: No  Experience shortness of breath at rest?: No  How far can you walk before becoming short of breath or need to rest? : None  Please list what causes you to become short of breath:: None  Have you ever been hospitalized?: Yes  Reason, year, and hospital in which you were hospitalized:: [facility]?  Have you ever needed to be intubated or placed on a ventilator? : No  Have you ever had problems with anesthesia?: No  Have you experienced post-operative delirium?: No  Any complications with surgery?: No  What year did you receive your last Flu shot?: 2017  What year did you receive you last Pneumonia shot?: ?  Have you had a TB skin test? If so, please  list the year and result:: I think so?  Have you had Allergy skin testing? If so, please list the year and result:: No  Please list all your occupations from your first job to your current job. 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 Job: Yes  a Mine or Quarry: Yes  a Foundry: No  with Pottery: No  Cotton Mill: No  Flax Mill: No  Hemp Mill: No  Brick Plant: No  with Asbestos: Yes  as a Sandblaster: No  manufacturing of glass, ceramics, or abrasives: No  Please list the places you have lived, the year, and Country or State in the [place]:: [place]  Birds?: No  Dogs?: Yes  Cats?: No  Mice and/or Deer Mice?: No  Reptiles?: No  Blood Chemistries: Yes 2019  Blood Count: Yes 2019  Chest X-ray: Yes 2019  Electrocardiogram: Yes 2018  Sleep Study: Yes  2018  Coffee: 1 cup  Energy drinks: 5 times a week  Carbonated soft drinks: 4 times a week    Conflicting answers have been found for some questions. Please document the patient's answers manually.

## 2019-02-25 NOTE — PROCEDURES
Technician: JULIO CESAR Jones    Technician Comment:  Good patient effort & cooperation.  The results of this test meet the ATS/ERS standards for acceptability & reproducibility.  Test was performed on the [employer] Plethysmograph-Elite DX system.  Predicted values were [facility]-3 for spirometry, [facility] for DLCO, ITS for Lung Volumes.  The DLCO was uncorrected for Hgb.  A bronchodilator of Ventolin HFA -2puffs via spacer administered.  DLCO performed during dilation period.    The FVC is 3.86 L or 85%, FEV1 is 2.89 L or 85%, FEV1/FVC 75%.  Total lung capacity 103%.  DLCO 101%.  No bronchodilator response.  Normal flow volume loop.    Interpretation:  Normal pulmonary function and gas transfer.

## 2019-02-26 ENCOUNTER — TELEPHONE (OUTPATIENT)
Dept: MEDICAL GROUP | Facility: MEDICAL CENTER | Age: 64
End: 2019-02-26

## 2019-02-26 LAB
GAMMA INTERFERON BACKGROUND BLD IA-ACNC: 0.04 IU/ML
M TB IFN-G BLD-IMP: NEGATIVE
M TB IFN-G CD4+ BCKGRND COR BLD-ACNC: -0.01 IU/ML
MITOGEN IGNF BCKGRD COR BLD-ACNC: >10 IU/ML
QFT TB2 - NIL TBQ2: -0.01 IU/ML

## 2019-02-26 NOTE — TELEPHONE ENCOUNTER
Phone Number Called: [phone]     Message: Spoke with [facility]. Auth needed to be updated with surgery center information. Gabby GORDON States that it will be complete today and they will call back with authorization number.  for Dr. Linder office. Will follow up once information is received.      Left Message for patient to call back: yes

## 2019-02-27 ENCOUNTER — TELEPHONE (OUTPATIENT)
Dept: MEDICAL GROUP | Facility: MEDICAL CENTER | Age: 64
End: 2019-02-27

## 2019-02-27 NOTE — TELEPHONE ENCOUNTER
Authorization for surgery at Dr. Linder office was approved 02/27/19  Auth #: [id number]     LM for Dr. Laws office with information

## 2019-03-11 DIAGNOSIS — C45.0 MALIGNANT MESOTHELIOMA OF PLEURA (HCC): ICD-10-CM

## 2019-03-11 NOTE — ASSESSMENT & PLAN NOTE
Patient had a lung biopsy after a mass was found in the pleura fortuitously on CT scan.  It comes back from [facility] as malignant mesothelioma.  Putting in an urgent referral to the pulmonary oncology program at [place].

## 2019-03-12 ENCOUNTER — OFFICE VISIT (OUTPATIENT)
Dept: PULMONOLOGY | Facility: HOSPICE | Age: 64
End: 2019-03-12
Payer: COMMERCIAL

## 2019-03-12 VITALS
SYSTOLIC BLOOD PRESSURE: 118 MMHG | WEIGHT: 174.6 LBS | HEIGHT: 69 IN | HEART RATE: 55 BPM | DIASTOLIC BLOOD PRESSURE: 72 MMHG | OXYGEN SATURATION: 94 % | RESPIRATION RATE: 16 BRPM | TEMPERATURE: 97.3 F | BODY MASS INDEX: 25.86 KG/M2

## 2019-03-12 DIAGNOSIS — Z78.9 NONSMOKER: ICD-10-CM

## 2019-03-12 DIAGNOSIS — Z77.090 HISTORY OF ASBESTOS EXPOSURE: ICD-10-CM

## 2019-03-12 DIAGNOSIS — C45.9 MESOTHELIOMA, MALIGNANT (HCC): ICD-10-CM

## 2019-03-12 PROCEDURE — 99213 OFFICE O/P EST LOW 20 MIN: CPT | Performed by: INTERNAL MEDICINE

## 2019-03-12 NOTE — PROGRESS NOTES
"Chief Complaint   Patient presents with   • Follow-Up     2 week FV     HPI: This patient is a very pleasant 63-year-old male who is accompanied by his wife, for follow-up of lung mass biopsy results.  He had a cardiac scoring CT scan January 25, 2019 which incidentally showed a lung mass.  Subsequent chest CAT scan with contrast on January 29, 2019 identified a 5 cm left lower pleural based mass, with pleural nodules and small pleural effusion.  There were associated enlarged left hilar lymph nodes.  The patient has had drenching night sweats in recent months.  He denies associated shortness of breath, cough, chest pain or hemoptysis.  He has had a 10 pound weight loss in the past month.  He is a lifelong non-smoker; he chews tobacco.  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  He also had worked around diatomacious earth at [employer].  He denies using a respirator at work.  He denies any past pulmonary history.  He had a CT-guided needle biopsy February 8, 2019 of his lung mass demonstrating \"atypical mesothelial proliferation\".  Final pathology at [place] confirmed malignant mesothelioma.  Pulmonary function testing show normal lung function with FEV1 2.89 L or 85%.  QuantiFERON TB Gold is negative.  He has been referred to [place] oncology for treatment.  In the interim he has left shoulder surgery.      Past Medical History:   Diagnosis Date   • Arrhythmia    • Arthritis     hands   • Back pain    • Chickenpox    • Degenerative disc disease     L4-5, L5-S1 most severe   • Depression    • Depression, reactive 10/18/2011   • Displacement of lumbar intervertebral disc without myelopathy     uncontrolled on Right   • ED (erectile dysfunction) 6/4/2010   • Esophageal reflux     uncontrolled   • Fatigue 6/4/2010   • Indigestion    • Lung cancer (HCC)    • Malignant mesothelioma of pleura (HCC) 3/11/2019   • Mixed hyperlipidemia     uncontrolled  " Last test: 6/2007:216/183/52/127   • Pain     lower back 4/10   • Palpitations     uncontrolled   • Pleural effusion    • Sleep apnea    • Sleep disturbance, unspecified     uncontrolled   • Tobacco abuse disorder 10/18/2011   • Unspecified essential hypertension     Controlled   • Unspecified vitamin D deficiency 6/4/2010       Social History     Social History   • Marital status:      Spouse name: N/A   • Number of children: N/A   • Years of education: N/A     Occupational History   • Not on file.     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 30 Tab 2   • magnesium oxide (MAG-OX) 400 MG Tab Take 400 mg by mouth every day.     • Potassium Gluconate 550 (90 K) MG Tab Take 1 Tab by mouth 2 Times a Day.     • Calcium-Magnesium-Vitamin D (CALCIUM 1200+D3 PO) Take 1 Cap by mouth 2 Times a Day.     • Zinc 50 MG Cap Take 50 mg by mouth 2 Times a Day.     • tramadol (ULTRAM) 50 MG Tab Take  mg by mouth 3 times a day.     • traZODone (DESYREL) 100 MG Tab Take 1 Tab by mouth every bedtime. 30 Tab 2   • levothyroxine (SYNTHROID) 88 MCG Tab Take 88 mcg by mouth Every morning on an empty  "stomach.  10   • lisinopril (PRINIVIL) 5 MG Tab Take 5 mg by mouth 2 times a day.  11   • metoprolol (LOPRESSOR) 25 MG Tab Take 25 mg by mouth 2 times a day.  10   • tamsulosin (FLOMAX) 0.4 MG capsule Take 0.4 mg by mouth every bedtime.  23   • oxyCODONE-acetaminophen (PERCOCET) 7.5-325 MG per tablet   0   • NON SPECIFIED Hill and Smooth Enzyme Formula, daily     • NON SPECIFIED Healthy CuraMed, twice a day       No current facility-administered medications on file prior to visit.        Allergies: Atorvastatin    ROS:   Constitutional: Denies fevers, chills, night sweats, fatigue or weight loss  Eyes: Denies vision loss, pain, drainage, double vision  Ears, Nose, Throat: Denies earache, difficulty hearing, tinnitus, nasal congestion, hoarseness  Cardiovascular: Denies chest pain, tightness, palpitations, orthopnea or edema  Respiratory: Denies shortness of breath, cough, wheezing, hemoptysis  Sleep: Denies daytime sleepiness, snoring, apneas, insomnia, morning headaches  GI: Denies heartburn, dysphagia, nausea, abdominal pain, diarrhea or constipation  : Denies frequent urination, hematuria, discharge or painful urination  Musculoskeletal: Denies back pain, +painful joints, denies sore muscles  Neurological: Denies weakness or headaches  Skin: No rashes    Blood pressure 118/72, pulse (!) 55, temperature 36.3 °C (97.3 °F), temperature source Temporal, resp. rate 16, height 1.753 m (5' 9\"), weight 79.2 kg (174 lb 9.6 oz), SpO2 94 %.    Physical Exam:  Appearance: Well-nourished, well-developed, in no acute distress  HEENT: Normocephalic, atraumatic, white sclera, PERRLA, oropharynx clear  Neck: No adenopathy or masses  Respiratory: no intercostal retractions or accessory muscle use  Lungs auscultation: Clear to auscultation bilaterally  Cardiovascular: Regular rate rhythm. No murmurs, rubs or gallops.  No LE edema  Abdomen: soft, nondistended  Gait: Normal  Digits: No clubbing, cyanosis  Motor: No focal " deficits  Orientation: Oriented to time, person and place    Diagnosis:  1. Mesothelioma, malignant (HCC)  REFERRAL TO ONCOLOGY NURSE NAVIGATOR   2. Nonsmoker     3. History of asbestos exposure         Plan:  The patient is referred to [place] oncology for treatment of malignant mesothelioma.  We will also refer her to oncology nurse navigation.  He is currently asymptomatic, was encouraged to contact me in the event of any respiratory symptomatology.  Return in about 3 months (around 6/12/2019), or if symptoms worsen or fail to improve.

## 2019-03-20 ENCOUNTER — PATIENT OUTREACH (OUTPATIENT)
Dept: OTHER | Facility: MEDICAL CENTER | Age: 64
End: 2019-03-20

## 2019-03-20 DIAGNOSIS — C43.9 MALIGNANT MELANOMA OF SKIN (HCC): ICD-10-CM

## 2019-03-20 NOTE — LETTER
[facility]   [address]  [place] [address]  Phone: [phone] - Fax: [phone]              [address]  [place] [address]     Date: 03/20/19      Dear Marcial,      I am a Cancer Nurse Navigator, a certified oncology nurse. My role is to assess any needs you may have with education, guidance and support. I am available to you and your family through your treatment at [facility].       I am available to address your needs during your journey with the following services:     Care Coordination  I can assist you in facilitating communication between your cancer care treatment team to ensure timely treatment and follow-up.  I can also assist with transition of care back to your primary care provider, or other specialist, as needed.  My goal is to bridge gaps for you throughout the course of your active treatment.       Education Services  Understanding the recommended treatment course by your physician is key. I can provide educational resources personalized to your cancer diagnosis to help you understand your diagnosis and treatment. Please let me know if you would like to receive information about your diagnosis and treatment plan.  I am here to help.     Support Services/Resource Information  [facility] we offer a full scope of support services.  I can assist you with referral information to:  · Cancer Clinical Trials & Research  · Nutrition counseling  · Support groups  · Complementary Therapies such as Healing Touch and Mind-Body Techniques Meditation  · Patient Financial Advocates  ·   · [facility], an [employer] affiliate office, our volunteers can assist you with accessing our [place] library, support services information, head coverings and comfort items  · Community and national resources, included eligibility based suman assistance and pharmaceutical access programs, if you are in need of  additional information.     [facility] offers services that include:  · Behavioral Health  · Genetic counseling & testing  · Acupuncture  · Lymphedema prevention/treatment program  · Palliative care services.          I hope you have an excellent patient experience.  Please feel free to share with me your comments regarding the care you have received- we value your feedback.    Sincerely,     Vannesa Tirado R.N.  Cancer Nurse Navigator    Main: [phone]   Office:  [phone]  Email: [email or web address]

## 2019-03-21 ENCOUNTER — PATIENT OUTREACH (OUTPATIENT)
Dept: OTHER | Facility: MEDICAL CENTER | Age: 64
End: 2019-03-21

## 2019-03-21 NOTE — PROGRESS NOTES
"Call placed to patient to introduce self and provide contact information.  Pt reports has scan next week.  He denies questions right now.  Pt reporting wife has been his support and helps arrange and organize everything.  He gave verbal permission to provide any information related to his care, if requested.  Provided education regarding cancer services available at [facility] as well as support services.  Talked about support groups and other possible resources for him. Pt very grateful for phone call.  He discussed that \"this is all new to me\" regarding cancer diagnosis.  Provided active listening and support.  Available as needed.  Encouraged patient to call if needs, questions or concerns arise.  "

## 2019-03-27 ENCOUNTER — HOSPITAL ENCOUNTER (OUTPATIENT)
Dept: RADIOLOGY | Facility: MEDICAL CENTER | Age: 64
End: 2019-03-27
Attending: FAMILY MEDICINE
Payer: COMMERCIAL

## 2019-03-27 DIAGNOSIS — C43.9 MALIGNANT MELANOMA OF SKIN (HCC): ICD-10-CM

## 2019-03-27 PROCEDURE — A9552 F18 FDG: HCPCS

## 2019-04-01 ENCOUNTER — OFFICE VISIT (OUTPATIENT)
Dept: MEDICAL GROUP | Facility: MEDICAL CENTER | Age: 64
End: 2019-04-01
Payer: COMMERCIAL

## 2019-04-01 VITALS
RESPIRATION RATE: 14 BRPM | WEIGHT: 171.08 LBS | HEART RATE: 53 BPM | SYSTOLIC BLOOD PRESSURE: 100 MMHG | BODY MASS INDEX: 25.34 KG/M2 | HEIGHT: 69 IN | OXYGEN SATURATION: 98 % | TEMPERATURE: 98 F | DIASTOLIC BLOOD PRESSURE: 66 MMHG

## 2019-04-01 DIAGNOSIS — F33.1 MODERATE EPISODE OF RECURRENT MAJOR DEPRESSIVE DISORDER (HCC): ICD-10-CM

## 2019-04-01 DIAGNOSIS — I10 ESSENTIAL HYPERTENSION: ICD-10-CM

## 2019-04-01 DIAGNOSIS — C45.0 MALIGNANT MESOTHELIOMA OF PLEURA (HCC): ICD-10-CM

## 2019-04-01 PROCEDURE — 99215 OFFICE O/P EST HI 40 MIN: CPT | Performed by: FAMILY MEDICINE

## 2019-04-01 RX ORDER — FLUOXETINE HYDROCHLORIDE 20 MG/1
20 CAPSULE ORAL DAILY
Qty: 90 CAP | Refills: 3 | Status: SHIPPED | OUTPATIENT
Start: 2019-04-01 | End: 2019-11-13 | Stop reason: SDUPTHER

## 2019-04-01 ASSESSMENT — PATIENT HEALTH QUESTIONNAIRE - PHQ9
3. TROUBLE FALLING OR STAYING ASLEEP OR SLEEPING TOO MUCH: 2
2. FEELING DOWN, DEPRESSED, IRRITABLE, OR HOPELESS: 3
9. THOUGHTS THAT YOU WOULD BE BETTER OFF DEAD, OR OF HURTING YOURSELF: 0
SUM OF ALL RESPONSES TO PHQ QUESTIONS 1-9: 13
5. POOR APPETITE OR OVEREATING: 2
4. FEELING TIRED OR HAVING LITTLE ENERGY: 3
8. MOVING OR SPEAKING SO SLOWLY THAT OTHER PEOPLE COULD HAVE NOTICED. OR THE OPPOSITE, BEING SO FIGETY OR RESTLESS THAT YOU HAVE BEEN MOVING AROUND A LOT MORE THAN USUAL: 0
7. TROUBLE CONCENTRATING ON THINGS, SUCH AS READING THE NEWSPAPER OR WATCHING TELEVISION: 0
SUM OF ALL RESPONSES TO PHQ9 QUESTIONS 1 AND 2: 6
1. LITTLE INTEREST OR PLEASURE IN DOING THINGS: 3
6. FEELING BAD ABOUT YOURSELF - OR THAT YOU ARE A FAILURE OR HAVE LET YOURSELF OR YOUR FAMILY DOWN: 0

## 2019-04-01 NOTE — PROGRESS NOTES
CC:Diagnoses of Malignant mesothelioma of pleura (HCC), Moderate episode of recurrent major depressive disorder (HCC), and Essential hypertension were pertinent to this visit.    HISTORY OF PRESENT ILLNESS: Patient is a 64 y.o. male established patient who presents today to follow-up on the PET scan that was done prior to his going to [facility] to be seen and evaluated because of pleural mesothelioma with no distant disease.  Patient did a PHQ 9 for me today that comes back with a score of 13 which is moderate depression which is most likely reactive to this new diagnosis.  Patient willing to consider medication.    Health Maintenance: Completed    Malignant mesothelioma of pleura (HCC)  This is a chronic problem that is now diagnosed with tissue.  Patient is     Moderate episode of recurrent major depressive disorder (HCC)  This is a new problem for this patient that most likely is just reactive to the fact that he has a serious oncologic diagnosis with mesothelioma of the pleura and his left lung.  He is on his way to [place] to talk to them about options for treatment.  He also is course concerned about his family and if something drastic were to happen to him how will they survive.  Patient has had depression in the past.  His current PHQ 9 score is a 13.  Depression Screen (PHQ-2/PHQ-9) 4/1/2019   PHQ-2 Total Score 6   PHQ-9 Total Score 13   Interpretation of PHQ-9 Total Score   Score Severity   1-4 No Depression   5-9 Mild Depression   10-14 Moderate Depression   15-19 Moderately Severe Depression   20-27 Severe Depression    We will plan to start fluoxetine 20 mg daily.      Essential hypertension  This is a chronic health problem for this patient that is well-controlled with current medications.  His current blood pressure is actually on the low side at 100/66, with a pulse of 53.  Patient's both on an ACE inhibitor and a beta-blocker.  He recently had left rotator cuff repair.  He  is on his way to [place] to discuss treatment options for his mesothelioma.  We may need to lower his lisinopril in light of his blood pressure being so low.  I will discussed with Minh Taveras MD his cardiologist.      Patient Active Problem List    Diagnosis Date Noted   • Malignant mesothelioma of pleura (HCC) 03/11/2019   • S/P ablation of atrial flutter 01/10/2019   • Statin intolerance 01/10/2019   • Typical atrial flutter (HCC) 01/09/2019   • Controlled substance agreement signed 01/03/2019   • Sleep disorder 01/03/2019   • Health care maintenance 01/03/2019   • Moderate episode of recurrent major depressive disorder (HCC) 10/18/2011   • Tobacco abuse disorder 10/18/2011   • Vitamin D deficiency 06/04/2010   • Displacement of lumbar intervertebral disc without myelopathy    • Essential hypertension    • Mixed hyperlipidemia    • Esophageal reflux    • Disturbance in sleep behavior    • Palpitations    • Degenerative disc disease       Allergies:Atorvastatin    Current Outpatient Prescriptions   Medication Sig Dispense Refill   • oxyCODONE-acetaminophen (PERCOCET) 7.5-325 MG per tablet   0   • zolpidem (AMBIEN) 10 MG Tab Take 1 Tab by mouth every bedtime for 90 days. 30 Tab 2   • magnesium oxide (MAG-OX) 400 MG Tab Take 400 mg by mouth every day.     • NON SPECIFIED Hill and Smooth Enzyme Formula, daily     • Potassium Gluconate 550 (90 K) MG Tab Take 1 Tab by mouth 2 Times a Day.     • Calcium-Magnesium-Vitamin D (CALCIUM 1200+D3 PO) Take 1 Cap by mouth 2 Times a Day.     • Zinc 50 MG Cap Take 50 mg by mouth 2 Times a Day.     • NON SPECIFIED Healthy CuraMed, twice a day     • tramadol (ULTRAM) 50 MG Tab Take  mg by mouth 3 times a day.     • traZODone (DESYREL) 100 MG Tab Take 1 Tab by mouth every bedtime. 30 Tab 2   • levothyroxine (SYNTHROID) 88 MCG Tab Take 88 mcg by mouth Every morning on an empty stomach.  10   • lisinopril (PRINIVIL) 5 MG Tab Take 5 mg by mouth 2 times a day.  11   •  metoprolol (LOPRESSOR) 25 MG Tab Take 25 mg by mouth 2 times a day.  10   • tamsulosin (FLOMAX) 0.4 MG capsule Take 0.4 mg by mouth every bedtime.  23     No current facility-administered medications for this visit.        Social History   Substance Use Topics   • Smoking status: Never Smoker   • Smokeless tobacco: Current User     Types: Chew      Comment: 1 can every 2 days for 30 years (2009)   • Alcohol use No     Social History     Social History Narrative   • No narrative on file       Family History   Problem Relation Age of Onset   • Hypertension Mother    • Arthritis Mother    • Heart Disease Mother         atrial fibrillation   • Diabetes Father    • Cancer Father         PROSTATE CA   • Heart Disease Father         CAD   • Hypertension Father         ROS:     - Constitutional:  Negative for fever, chills, unexpected weight change, and fatigue/generalized weakness.    - HEENT:  Negative for headaches, vision changes, hearing changes, ear pain, ear discharge, rhinorrhea, sinus congestion, sore throat, and neck pain.      - Respiratory: Negative for cough, sputum production, chest congestion, dyspnea, wheezing, and crackles.      - Cardiovascular: Negative for chest pain, palpitations, orthopnea, and bilateral lower extremity edema.     - Gastrointestinal: Negative for heartburn, nausea, vomiting, abdominal pain, hematochezia, melena, diarrhea, constipation, and greasy/foul-smelling stools.     - Genitourinary: Negative for dysuria, polyuria, hematuria, pyuria, urinary urgency, and urinary incontinence.     - Musculoskeletal: Left shoulder pain secondary to recent rotator cuff repair.      - Skin: Negative for rash, itching, cyanotic skin color change.     - Neurological: Negative for dizziness, tingling, tremors, focal sensory deficit, focal weakness and headaches.     - Endo/Heme/Allergies: Does not bruise/bleed easily.     - Psychiatric/Behavioral: Positive for depression but absolutely denies suicidal or  "homicidal ideation.  Admits to fear about the mesothelioma diagnosis.           - NOTE: All other systems reviewed and are negative, except as in HPI.      Exam:    Blood pressure 100/66, pulse (!) 53, temperature 36.7 °C (98 °F), temperature source Temporal, resp. rate 14, height 1.753 m (5' 9\"), weight 77.6 kg (171 lb 1.2 oz), SpO2 98 %. Body mass index is 25.26 kg/m².    General:  Well nourished, well developed male shoulder immobilizer on the left in place.  Patient does become tearful as we talk about his mesothelioma diagnosis  Head is grossly normal.  Patient was seen for 35 minutes face to face of which, 25 minutes was spent counseling regarding review of PET scan, pulmonary function testing, pathology and a discussion of all that will go on down at [facility] and also discussion of depression..    Please note that this dictation was created using voice recognition software. I have made every reasonable attempt to correct obvious errors, but I expect that there are errors of grammar and possibly content that I did not discover before finalizing the note.    Assessment/Plan:  1. Malignant mesothelioma of pleura (HCC)  Uncontrolled, pt will be seen at [facility] tomorrow and we will await their recommendations.    2. Moderate episode of recurrent major depressive disorder (HCC)  Uncontrolled, will start on Fluoxetine 20 mg daily in AM and recheck in 6 weeks.    3. Essential hypertension  Controlled, continue with current meds and lifestyle.           "

## 2019-04-01 NOTE — ASSESSMENT & PLAN NOTE
This is a chronic health problem for this patient that is well-controlled with current medications.  His current blood pressure is actually on the low side at 100/66, with a pulse of 53.  Patient's both on an ACE inhibitor and a beta-blocker.  He recently had left rotator cuff repair.  He is on his way to [place] to discuss treatment options for his mesothelioma.  We may need to lower his lisinopril in light of his blood pressure being so low.  I will discussed with Minh Taveras MD his cardiologist.

## 2019-04-01 NOTE — ASSESSMENT & PLAN NOTE
This is a new problem for this patient that most likely is just reactive to the fact that he has a serious oncologic diagnosis with mesothelioma of the pleura and his left lung.  He is on his way to [place] to talk to them about options for treatment.  He also is course concerned about his family and if something drastic were to happen to him how will they survive.  Patient has had depression in the past.  His current PHQ 9 score is a 13.  Depression Screen (PHQ-2/PHQ-9) 4/1/2019   PHQ-2 Total Score 6   PHQ-9 Total Score 13   Interpretation of PHQ-9 Total Score   Score Severity   1-4 No Depression   5-9 Mild Depression   10-14 Moderate Depression   15-19 Moderately Severe Depression   20-27 Severe Depression    We will plan to start fluoxetine 20 mg daily.

## 2019-04-03 ENCOUNTER — TELEPHONE (OUTPATIENT)
Dept: MEDICAL GROUP | Facility: MEDICAL CENTER | Age: 64
End: 2019-04-03

## 2019-04-03 DIAGNOSIS — M75.100 TEAR OF ROTATOR CUFF, UNSPECIFIED LATERALITY, UNSPECIFIED TEAR EXTENT: ICD-10-CM

## 2019-04-05 ENCOUNTER — PATIENT MESSAGE (OUTPATIENT)
Dept: MEDICAL GROUP | Facility: MEDICAL CENTER | Age: 64
End: 2019-04-05

## 2019-04-05 DIAGNOSIS — H93.13 TINNITUS AURIUM, BILATERAL: ICD-10-CM

## 2019-04-08 NOTE — TELEPHONE ENCOUNTER
From: Marcial Morrison  To: Kandi Rangel M.D.  Sent: 4/5/2019 6:40 PM PDT  Subject: Non-Urgent Medical Question    I’m not sure I answered your question this morning but we are [facility]. Also after talking with the PA from [facility] this morning and if chemotherapy goes well they think Marcial may be good to do a resection of the lung. I guess Marcial will rethink his options after the 4 rounds of the Chemo. We really appreciate everything you do for us.   Thank you so much  Maryellen

## 2019-04-17 DIAGNOSIS — C45.0 MALIGNANT MESOTHELIOMA OF PLEURA (HCC): ICD-10-CM

## 2019-04-17 RX ORDER — LEVOTHYROXINE SODIUM 88 UG/1
88 TABLET ORAL
Qty: 90 TAB | Refills: 3 | Status: SHIPPED | OUTPATIENT
Start: 2019-04-17 | End: 2019-11-13 | Stop reason: SDUPTHER

## 2019-04-17 RX ORDER — TRAMADOL HYDROCHLORIDE 50 MG/1
100 TABLET ORAL 3 TIMES DAILY
Qty: 180 TAB | Refills: 0 | Status: SHIPPED | OUTPATIENT
Start: 2019-04-17 | End: 2019-05-17

## 2019-04-24 ENCOUNTER — PATIENT MESSAGE (OUTPATIENT)
Dept: MEDICAL GROUP | Facility: MEDICAL CENTER | Age: 64
End: 2019-04-24

## 2019-05-21 ENCOUNTER — OFFICE VISIT (OUTPATIENT)
Dept: MEDICAL GROUP | Facility: MEDICAL CENTER | Age: 64
End: 2019-05-21
Payer: COMMERCIAL

## 2019-05-21 VITALS
OXYGEN SATURATION: 95 % | RESPIRATION RATE: 14 BRPM | HEART RATE: 56 BPM | SYSTOLIC BLOOD PRESSURE: 118 MMHG | HEIGHT: 69 IN | BODY MASS INDEX: 25.24 KG/M2 | DIASTOLIC BLOOD PRESSURE: 62 MMHG | WEIGHT: 170.42 LBS | TEMPERATURE: 98.2 F

## 2019-05-21 DIAGNOSIS — M51.06 DISC DISEASE WITH MYELOPATHY, LUMBAR: ICD-10-CM

## 2019-05-21 DIAGNOSIS — C45.0 MALIGNANT MESOTHELIOMA OF PLEURA (HCC): ICD-10-CM

## 2019-05-21 PROCEDURE — 99215 OFFICE O/P EST HI 40 MIN: CPT | Performed by: FAMILY MEDICINE

## 2019-05-21 RX ORDER — TRAMADOL HYDROCHLORIDE 50 MG/1
50 TABLET ORAL 4 TIMES DAILY
Qty: 360 TAB | Refills: 0 | Status: SHIPPED | OUTPATIENT
Start: 2019-05-21 | End: 2019-08-16 | Stop reason: SDUPTHER

## 2019-05-21 NOTE — ASSESSMENT & PLAN NOTE
This is a chronic health problem that the patient has decided not to treat with Chemotherapy.  He had many side effects from the Chemo that made it impossible for him to tolerate.  Patient has decided to stop treatment and live quality of life.

## 2019-05-21 NOTE — ASSESSMENT & PLAN NOTE
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  He has been able to wean off all his medications except for his tramadol.  He takes a minimal dose of 50 mg QID.  He will continue this long term.  Obtained and reviewed patient utilization report from [facility] pharmacy database on 5/21/2019 3:10 PM  prior to writing prescription for controlled substance II, III or IV per [place] bill . Based on assessment of the report,my physical exam if necessary and the patient's health problem, the prescription is medically necessary.

## 2019-05-22 NOTE — PROGRESS NOTES
CC:Diagnoses of Disc disease with myelopathy, lumbar and Malignant mesothelioma of pleura (HCC) were pertinent to this visit.    HISTORY OF PRESENT ILLNESS: Patient is a 64 y.o. male established patient who presents today to get a refill of his tramadol and to discuss his decision not to pursue further chemotherapy for his malignant mesothelioma.  Patient is accompanied by his wife who adds to his health history.    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  He has been able to wean off all his medications except for his tramadol.  He takes a minimal dose of 50 mg QID.  He will continue this long term.  Obtained and reviewed patient utilization report from [facility] pharmacy database on 5/21/2019 3:10 PM  prior to writing prescription for controlled substance II, III or IV per [place] bill . Based on assessment of the report,my physical exam if necessary and the patient's health problem, the prescription is medically necessary.       Malignant mesothelioma of pleura (HCC)  This is a chronic health problem that the patient has decided not to treat with Chemotherapy.  He had many side effects from the Chemo that made it impossible for him to tolerate.  Patient has decided to stop treatment and live quality of life.      Patient Active Problem List    Diagnosis Date Noted   • Malignant mesothelioma of pleura (HCC) 03/11/2019   • S/P ablation of atrial flutter 01/10/2019   • Statin intolerance 01/10/2019   • Typical atrial flutter (HCC) 01/09/2019   • Controlled substance agreement signed 01/03/2019   • Sleep disorder 01/03/2019   • Health care maintenance 01/03/2019   • Moderate episode of recurrent major depressive disorder (HCC) 10/18/2011   • Tobacco abuse disorder 10/18/2011   • Vitamin D deficiency 06/04/2010   • Displacement of lumbar intervertebral disc without myelopathy    • Essential  hypertension    • Mixed hyperlipidemia    • Esophageal reflux    • Disturbance in sleep behavior    • Palpitations    • Disc disease with myelopathy, lumbar       Allergies:Atorvastatin    Current Outpatient Prescriptions   Medication Sig Dispense Refill   • tramadol (ULTRAM) 50 MG Tab Take 1 Tab by mouth 4 times a day for 90 days. 360 Tab 0   • metoprolol (LOPRESSOR) 25 MG Tab Take 1 Tab by mouth 2 times a day. 180 Tab 3   • levothyroxine (SYNTHROID) 88 MCG Tab Take 1 Tab by mouth Every morning on an empty stomach. 90 Tab 3   • FLUoxetine (PROZAC) 20 MG Cap Take 1 Cap by mouth every day. 90 Cap 3   • magnesium oxide (MAG-OX) 400 MG Tab Take 400 mg by mouth every day.     • NON SPECIFIED Hill and Smooth Enzyme Formula, daily     • Potassium Gluconate 550 (90 K) MG Tab Take 1 Tab by mouth 2 Times a Day.     • Calcium-Magnesium-Vitamin D (CALCIUM 1200+D3 PO) Take 1 Cap by mouth 2 Times a Day.     • Zinc 50 MG Cap Take 50 mg by mouth 2 Times a Day.     • NON SPECIFIED Healthy CuraMed, twice a day     • traZODone (DESYREL) 100 MG Tab Take 1 Tab by mouth every bedtime. 30 Tab 2   • lisinopril (PRINIVIL) 5 MG Tab Take 5 mg by mouth 2 times a day.  11   • tamsulosin (FLOMAX) 0.4 MG capsule Take 0.4 mg by mouth every bedtime.  23     No current facility-administered medications for this visit.        Social History   Substance Use Topics   • Smoking status: Never Smoker   • Smokeless tobacco: Current User     Types: Chew      Comment: 1 can every 2 days for 30 years (2009)   • Alcohol use No     Social History     Social History Narrative   • No narrative on file       Family History   Problem Relation Age of Onset   • Hypertension Mother    • Arthritis Mother    • Heart Disease Mother         atrial fibrillation   • Diabetes Father    • Cancer Father         PROSTATE CA   • Heart Disease Father         CAD   • Hypertension Father         ROS:     - Constitutional: Patient just now starting to feel normal after being  "greatly fatigued for 5 weeks after chemotherapy.     - HEENT:  Negative for headaches, vision changes, hearing changes, ear pain, ear discharge, rhinorrhea, sinus congestion, sore throat, and neck pain.      - Respiratory: Negative for cough, sputum production, chest congestion, dyspnea, wheezing, and crackles.      - Cardiovascular: Negative for chest pain, palpitations, orthopnea, and bilateral lower extremity edema.     - Gastrointestinal: Negative for heartburn, nausea, vomiting, abdominal pain, hematochezia, melena, diarrhea, constipation, and greasy/foul-smelling stools.     - Genitourinary: Negative for dysuria, polyuria, hematuria, pyuria, urinary urgency, and urinary incontinence.     - Musculoskeletal: Patient continues to deal with low back pain on a daily basis sometimes with radiation into his lower extremities bilaterally.      - Skin: Negative for rash, itching, cyanotic skin color change.     - Neurological: Positive for intermittent bilateral sciatica depending on activity.      - Endo/Heme/Allergies: Does not bruise/bleed easily.     - Psychiatric/Behavioral: Negative for depression, suicidal/homicidal ideation and memory loss.          - NOTE: All other systems reviewed and are negative, except as in HPI.      Exam:    /62 (BP Location: Left arm, Patient Position: Sitting, BP Cuff Size: Adult)   Pulse (!) 56   Temp 36.8 °C (98.2 °F) (Temporal)   Resp 14   Ht 1.753 m (5' 9\")   Wt 77.3 kg (170 lb 6.7 oz)   SpO2 95%  Body mass index is 25.17 kg/m².    General:  Well nourished, well developed male in NAD  Head is grossly normal.    Patient was seen for 45 minutes face to face of which, 35 minutes was spent counseling regarding his decision to not continue treatment for his malignant mesothelioma and need for refills of tramadol.  Patient had already been on this medication for his failed back despite surgery.  He is now also using it to help with the pain from the malignant mesothelioma.  " He is a cancer patient and we will continue to provide this medication.  We also talked about options for activities as he moves forward and what to expect as the disease progresses..    Please note that this dictation was created using voice recognition software. I have made every reasonable attempt to correct obvious errors, but I expect that there are errors of grammar and possibly content that I did not discover before finalizing the note.    Assessment/Plan:  1. Disc disease with myelopathy, lumbar  Uncontrolled without tramadol, appropriate to refill at this time  - tramadol (ULTRAM) 50 MG Tab; Take 1 Tab by mouth 4 times a day for 90 days.  Dispense: 360 Tab; Refill: 0    2. Malignant mesothelioma of pleura (HCC)  Uncontrolled, patient refuses to pursue further therapy.  We will continue to follow with him and be prepared for when he is ready to go to hospice.  - tramadol (ULTRAM) 50 MG Tab; Take 1 Tab by mouth 4 times a day for 90 days.  Dispense: 360 Tab; Refill: 0

## 2019-06-13 ENCOUNTER — APPOINTMENT (OUTPATIENT)
Dept: PULMONOLOGY | Facility: HOSPICE | Age: 64
End: 2019-06-13
Payer: COMMERCIAL

## 2019-07-16 ENCOUNTER — HOSPITAL ENCOUNTER (OUTPATIENT)
Dept: RADIOLOGY | Facility: MEDICAL CENTER | Age: 64
End: 2019-07-16
Attending: ORTHOPAEDIC SURGERY
Payer: COMMERCIAL

## 2019-07-16 DIAGNOSIS — M25.512 LEFT SHOULDER PAIN, UNSPECIFIED CHRONICITY: ICD-10-CM

## 2019-07-16 PROCEDURE — 73221 MRI JOINT UPR EXTREM W/O DYE: CPT | Mod: LT

## 2019-08-12 ENCOUNTER — PATIENT MESSAGE (OUTPATIENT)
Dept: MEDICAL GROUP | Facility: MEDICAL CENTER | Age: 64
End: 2019-08-12

## 2019-08-13 ENCOUNTER — PATIENT MESSAGE (OUTPATIENT)
Dept: MEDICAL GROUP | Facility: MEDICAL CENTER | Age: 64
End: 2019-08-13

## 2019-08-13 NOTE — TELEPHONE ENCOUNTER
From: Marcial Morrison  To: Kandi Rangel M.D.  Sent: 8/13/2019 8:05 AM PDT  Subject: Prescription Question    yep I can wait... just 2 nights without. Yes I will see you Friday at 11:30.    ----- Message -----  From: Kandi Rangel M.D.  Sent: 8/12/19, 2:01 PM  To: Marcial Morrison  Subject: RE: Prescription Question    Lio,  I have to see you face to face to do this rx. Do we see you on Friday and can you wait til then for the refill?  Thank you,  Dr. WALKER      ----- Message -----   From: Marcial Morrison   Sent: 8/12/2019 10:25 AM PDT   To: Kandi Rangel M.D.  Subject: Prescription Question    My prescription for Zolpidem Tartrate needs to be filled and the pharmacy should be sending you a request. The pharmacy requested I send an email to let you know the request would be sent your way!  Have a most wonderful day! Looking forward to seeing you on Friday.  Lio

## 2019-08-16 ENCOUNTER — OFFICE VISIT (OUTPATIENT)
Dept: MEDICAL GROUP | Facility: MEDICAL CENTER | Age: 64
End: 2019-08-16

## 2019-08-16 ENCOUNTER — APPOINTMENT (OUTPATIENT)
Dept: MEDICAL GROUP | Facility: MEDICAL CENTER | Age: 64
End: 2019-08-16
Payer: COMMERCIAL

## 2019-08-16 VITALS
BODY MASS INDEX: 26.45 KG/M2 | DIASTOLIC BLOOD PRESSURE: 62 MMHG | HEIGHT: 69 IN | WEIGHT: 178.57 LBS | RESPIRATION RATE: 14 BRPM | TEMPERATURE: 97.6 F | SYSTOLIC BLOOD PRESSURE: 102 MMHG | OXYGEN SATURATION: 96 % | HEART RATE: 52 BPM

## 2019-08-16 DIAGNOSIS — C45.0 MALIGNANT MESOTHELIOMA OF PLEURA (HCC): ICD-10-CM

## 2019-08-16 DIAGNOSIS — M51.06 DISC DISEASE WITH MYELOPATHY, LUMBAR: ICD-10-CM

## 2019-08-16 DIAGNOSIS — M51.26 DISPLACEMENT OF LUMBAR INTERVERTEBRAL DISC WITHOUT MYELOPATHY: ICD-10-CM

## 2019-08-16 DIAGNOSIS — Z79.899 CONTROLLED SUBSTANCE AGREEMENT SIGNED: ICD-10-CM

## 2019-08-16 DIAGNOSIS — F51.01 PRIMARY INSOMNIA: ICD-10-CM

## 2019-08-16 PROCEDURE — 99214 OFFICE O/P EST MOD 30 MIN: CPT | Performed by: FAMILY MEDICINE

## 2019-08-16 RX ORDER — ZOLPIDEM TARTRATE 10 MG/1
10 TABLET ORAL
Qty: 90 TAB | Refills: 0 | Status: SHIPPED | OUTPATIENT
Start: 2019-08-16 | End: 2019-11-13 | Stop reason: SDUPTHER

## 2019-08-16 RX ORDER — TRAMADOL HYDROCHLORIDE 50 MG/1
100 TABLET ORAL 4 TIMES DAILY
Qty: 720 TAB | Refills: 0 | Status: SHIPPED | OUTPATIENT
Start: 2019-08-16 | End: 2019-11-13 | Stop reason: SDUPTHER

## 2019-08-16 NOTE — ASSESSMENT & PLAN NOTE
This is a chronic health problem for this patient that unfortunately the patient could not tolerate the chemotherapy to try and help him.  When he took his first dose he developed almost every side effect there is.  He got a blistering rash to his skin and then blisters inside of his mouth that made it impossible for him to eat or even drink fluids easily.  He then also felt like he had something crawling underneath his skin and developed a severe depression which is not typical for Marcial's personality.  He also found that he had a almost locked jaw type reaction to where he was clenching his jaw constantly and grinding his teeth.  All of this made him very sore with myalgias and arthralgias as well.  It also made it so that he could not sleep because he was in so much pain.  Marcial made the decision not to continue with chemotherapy if that is what is going to do to him.  He realizes that shortens his life and that his diagnosis is a terminal diagnosis.

## 2019-08-16 NOTE — PROGRESS NOTES
CC:Diagnoses of Controlled substance agreement signed, Malignant mesothelioma of pleura (HCC), Disc disease with myelopathy, lumbar, Primary insomnia, and Displacement of lumbar intervertebral disc without myelopathy were pertinent to this visit.    HISTORY OF PRESENT ILLNESS: Patient is a 64 y.o. male established patient who presents today to talk about his problems with chronic pain secondary to a bad back as well as mesothelioma that he cannot tolerate chemotherapy for.  Patient now has a terminal diagnosis.  We will treat his pain as we would for any other cancer patient.  In addition patient has insomnia for which she has used zolpidem with good results in the past and its time for refill of that medication.    Health Maintenance: Completed    Controlled substance agreement signed  This patient requires tramadol to help with the pain from mesothelioma and then requires Ambien just for sleep.  He is having some low back pain as well so we will definitely get lumbar spine films just to make certain that he is not developing any metastatic bone pain.  Obtained and reviewed patient utilization report from [facility] pharmacy database on 8/16/2019 2:47 PM  prior to writing prescription for controlled substance II, III or IV per [place] bill . Based on assessment of the report,my physical exam if necessary and the patient's health problem, the prescription is medically necessary.       Malignant mesothelioma of pleura (HCC)  This is a chronic health problem for this patient that unfortunately the patient could not tolerate the chemotherapy to try and help him.  When he took his first dose he developed almost every side effect there is.  He got a blistering rash to his skin and then blisters inside of his mouth that made it impossible for him to eat or even drink fluids easily.  He then also felt like he had something crawling underneath his skin and developed a severe depression which is not typical for Marcial's  personality.  He also found that he had a almost locked jaw type reaction to where he was clenching his jaw constantly and grinding his teeth.  All of this made him very sore with myalgias and arthralgias as well.  It also made it so that he could not sleep because he was in so much pain.  Marcial made the decision not to continue with chemotherapy if that is what is going to do to him.  He realizes that shortens his life and that his diagnosis is a terminal diagnosis.    Primary insomnia  This is a chronic health problem for this patient for which she utilizes Ambien for adequate sleep.  Currently he is having so much pain that even with Ambien he is not sleeping well.  We are going to try increasing his tramadol.  This patient has a terminal diagnosis from mesothelioma so we are trying to treat his pain as well as his insomnia.  Obtained and reviewed patient utilization report from [facility] pharmacy database on 8/16/2019 2:53 PM  prior to writing prescription for controlled substance II, III or IV per [place] bill . Based on assessment of the report,my physical exam if necessary and the patient's health problem, the prescription is medically necessary.       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  He is the  on the entire project.  He is doing been doing a great deal of physical activity.  He does have mesothelioma that originally was confined only to the lung.  I am concerned that his back pain could be more than just musculoskeletal pain.  I want to make certain that he has not had any compression fractures or developed any bony metastases.  We will have him get x-rays today and then MyChart him with that result.      Patient Active Problem List    Diagnosis Date Noted   • Malignant mesothelioma of pleura (HCC) 03/11/2019   • S/P ablation of atrial flutter 01/10/2019   • Statin intolerance  01/10/2019   • Typical atrial flutter (HCC) 01/09/2019   • Controlled substance agreement signed 01/03/2019   • Primary insomnia 01/03/2019   • Health care maintenance 01/03/2019   • Moderate episode of recurrent major depressive disorder (HCC) 10/18/2011   • Tobacco abuse disorder 10/18/2011   • Vitamin D deficiency 06/04/2010   • Displacement of lumbar intervertebral disc without myelopathy    • Essential hypertension    • Mixed hyperlipidemia    • Esophageal reflux    • Disturbance in sleep behavior    • Palpitations    • Disc disease with myelopathy, lumbar       Allergies:Atorvastatin    Current Outpatient Medications   Medication Sig Dispense Refill   • tramadol (ULTRAM) 50 MG Tab Take 2 Tabs by mouth 4 times a day for 90 days. 720 Tab 0   • zolpidem (AMBIEN) 10 MG Tab Take 1 Tab by mouth every bedtime for 90 days. 90 Tab 0   • metoprolol (LOPRESSOR) 25 MG Tab Take 1 Tab by mouth 2 times a day. 180 Tab 3   • levothyroxine (SYNTHROID) 88 MCG Tab Take 1 Tab by mouth Every morning on an empty stomach. 90 Tab 3   • FLUoxetine (PROZAC) 20 MG Cap Take 1 Cap by mouth every day. 90 Cap 3   • magnesium oxide (MAG-OX) 400 MG Tab Take 400 mg by mouth every day.     • NON SPECIFIED Hill and Smooth Enzyme Formula, daily     • Potassium Gluconate 550 (90 K) MG Tab Take 1 Tab by mouth 2 Times a Day.     • Calcium-Magnesium-Vitamin D (CALCIUM 1200+D3 PO) Take 1 Cap by mouth 2 Times a Day.     • Zinc 50 MG Cap Take 50 mg by mouth 2 Times a Day.     • NON SPECIFIED Healthy CuraMed, twice a day     • lisinopril (PRINIVIL) 5 MG Tab Take 5 mg by mouth 2 times a day.  11   • tamsulosin (FLOMAX) 0.4 MG capsule Take 0.4 mg by mouth every bedtime.  23     No current facility-administered medications for this visit.        Social History     Tobacco Use   • Smoking status: Never Smoker   • Smokeless tobacco: Current User     Types: Chew   • Tobacco comment: 1 can every 2 days for 30 years (2009)   Substance Use Topics   • Alcohol use:  "No   • Drug use: No     Social History     Social History Narrative   • Not on file       Family History   Problem Relation Age of Onset   • Hypertension Mother    • Arthritis Mother    • Heart Disease Mother         atrial fibrillation   • Diabetes Father    • Cancer Father         PROSTATE CA   • Heart Disease Father         CAD   • Hypertension Father         ROS:     - Constitutional: Positive for fatigue and malaise secondary to back pain and poor sleep.     - HEENT:  Negative for headaches, vision changes, hearing changes, ear pain, ear discharge, rhinorrhea, sinus congestion, sore throat, and neck pain.      - Respiratory: Negative for cough, sputum production, chest congestion, dyspnea, wheezing, and crackles.      - Cardiovascular: Negative for chest pain, palpitations, orthopnea, and bilateral lower extremity edema.     - Gastrointestinal: Negative for heartburn, nausea, vomiting, abdominal pain, hematochezia, melena, diarrhea, constipation, and greasy/foul-smelling stools.     - Genitourinary: Negative for dysuria, polyuria, hematuria, pyuria, urinary urgency, and urinary incontinence.     - Musculoskeletal: Positive for low back pain radiating throughout his back and into his buttocks and legs.     - Skin: Negative for rash, itching, cyanotic skin color change.     - Neurological: Positive for lumbar radiculopathy into the lower extremities bilaterally.    - Endo/Heme/Allergies: Does not bruise/bleed easily.     - Psychiatric/Behavioral: Negative for depression, suicidal/homicidal ideation and memory loss.          - NOTE: All other systems reviewed and are negative, except as in HPI.      Exam:    /62 (BP Location: Left arm, Patient Position: Sitting, BP Cuff Size: Adult)   Pulse (!) 52   Temp 36.4 °C (97.6 °F) (Temporal)   Resp 14   Ht 1.753 m (5' 9\")   Wt 81 kg (178 lb 9.2 oz)   SpO2 96%  Body mass index is 26.37 kg/m².    General:  Well nourished, well developed male in NAD  Head is " grossly normal.  Neck: Supple without JVD or bruit. Thyroid is not enlarged.  Pulmonary: Clear to ausculation and percussion.  Normal effort. No rales, ronchi, or wheezing.  Cardiovascular: Regular rate and rhythm without murmur. Carotid and radial pulses are intact and equal bilaterally.  Back: Spinous processes are in good alignment and nontender to palpation.  There is paraspinous muscle spasms bilaterally and straight leg raising is positive bilaterally.    Please note that this dictation was created using voice recognition software. I have made every reasonable attempt to correct obvious errors, but I expect that there are errors of grammar and possibly content that I did not discover before finalizing the note.    Assessment/Plan:  1. Controlled substance agreement signed  Renewed patient's tramadol and Ambien with the added caution that he cannot take these at the same time when he goes to sleep at night.  His wife manages his medications for him and she was at his visit and understands the black box warning for these meds.    2. Malignant mesothelioma of pleura (HCC)  Uncontrolled, patient developing increasing back pain which makes me concerned that he may be developing metastases to bone.  We will check x-rays and then determine next steps  - tramadol (ULTRAM) 50 MG Tab; Take 2 Tabs by mouth 4 times a day for 90 days.  Dispense: 720 Tab; Refill: 0    3. Disc disease with myelopathy, lumbar  Uncontrolled, we will increase his Ultram to 2 tablets 4 times a day and see if that helps to control his pain.  - tramadol (ULTRAM) 50 MG Tab; Take 2 Tabs by mouth 4 times a day for 90 days.  Dispense: 720 Tab; Refill: 0  - DX-LUMBAR SPINE-2 OR 3 VIEWS; Future    4. Primary insomnia  Uncontrolled, patient's been out of his zolpidem.  We have renewed that for the coming 90 days  - zolpidem (AMBIEN) 10 MG Tab; Take 1 Tab by mouth every bedtime for 90 days.  Dispense: 90 Tab; Refill: 0    5. Displacement of lumbar  intervertebral disc without myelopathy  Uncontrolled, we will get x-rays and determine whether or not there is new pathology present.  - DX-LUMBAR SPINE-2 OR 3 VIEWS; Future

## 2019-08-16 NOTE — ASSESSMENT & PLAN NOTE
This is a chronic health problem for this patient for which she utilizes Ambien for adequate sleep.  Currently he is having so much pain that even with Ambien he is not sleeping well.  We are going to try increasing his tramadol.  This patient has a terminal diagnosis from mesothelioma so we are trying to treat his pain as well as his insomnia.  Obtained and reviewed patient utilization report from [facility] pharmacy database on 8/16/2019 2:53 PM  prior to writing prescription for controlled substance II, III or IV per [place] bill . Based on assessment of the report,my physical exam if necessary and the patient's health problem, the prescription is medically necessary.

## 2019-08-16 NOTE — ASSESSMENT & PLAN NOTE
This patient requires tramadol to help with the pain from mesothelioma and then requires Ambien just for sleep.  He is having some low back pain as well so we will definitely get lumbar spine films just to make certain that he is not developing any metastatic bone pain.  Obtained and reviewed patient utilization report from [facility] pharmacy database on 8/16/2019 2:47 PM  prior to writing prescription for controlled substance II, III or IV per [place] bill . Based on assessment of the report,my physical exam if necessary and the patient's health problem, the prescription is medically necessary.

## 2019-08-16 NOTE — ASSESSMENT & PLAN NOTE
Patient here today having gone back [place].  He is the  on the entire project.  He is doing been doing a great deal of physical activity.  He does have mesothelioma that originally was confined only to the lung.  I am concerned that his back pain could be more than just musculoskeletal pain.  I want to make certain that he has not had any compression fractures or developed any bony metastases.  We will have him get x-rays today and then MyChart him with that result.

## 2019-11-12 ENCOUNTER — APPOINTMENT (OUTPATIENT)
Dept: RADIOLOGY | Facility: IMAGING CENTER | Age: 64
End: 2019-11-12
Attending: FAMILY MEDICINE
Payer: COMMERCIAL

## 2019-11-12 DIAGNOSIS — M51.26 DISPLACEMENT OF LUMBAR INTERVERTEBRAL DISC WITHOUT MYELOPATHY: ICD-10-CM

## 2019-11-12 DIAGNOSIS — M51.06 DISC DISEASE WITH MYELOPATHY, LUMBAR: ICD-10-CM

## 2019-11-12 PROCEDURE — 72100 X-RAY EXAM L-S SPINE 2/3 VWS: CPT | Mod: TC,FY | Performed by: FAMILY MEDICINE

## 2019-11-13 ENCOUNTER — OFFICE VISIT (OUTPATIENT)
Dept: MEDICAL GROUP | Facility: MEDICAL CENTER | Age: 64
End: 2019-11-13
Payer: COMMERCIAL

## 2019-11-13 VITALS
TEMPERATURE: 97.8 F | BODY MASS INDEX: 26.48 KG/M2 | HEART RATE: 55 BPM | SYSTOLIC BLOOD PRESSURE: 102 MMHG | RESPIRATION RATE: 12 BRPM | WEIGHT: 178.79 LBS | OXYGEN SATURATION: 98 % | DIASTOLIC BLOOD PRESSURE: 64 MMHG | HEIGHT: 69 IN

## 2019-11-13 DIAGNOSIS — I10 ESSENTIAL HYPERTENSION: ICD-10-CM

## 2019-11-13 DIAGNOSIS — M51.06 DISC DISEASE WITH MYELOPATHY, LUMBAR: ICD-10-CM

## 2019-11-13 DIAGNOSIS — F33.1 MODERATE EPISODE OF RECURRENT MAJOR DEPRESSIVE DISORDER (HCC): ICD-10-CM

## 2019-11-13 DIAGNOSIS — F51.01 PRIMARY INSOMNIA: ICD-10-CM

## 2019-11-13 DIAGNOSIS — C45.0 MALIGNANT MESOTHELIOMA OF PLEURA (HCC): ICD-10-CM

## 2019-11-13 PROCEDURE — 99214 OFFICE O/P EST MOD 30 MIN: CPT | Performed by: FAMILY MEDICINE

## 2019-11-13 RX ORDER — TRAMADOL HYDROCHLORIDE 50 MG/1
100 TABLET ORAL 4 TIMES DAILY
Qty: 720 TAB | Refills: 0 | Status: SHIPPED | OUTPATIENT
Start: 2019-11-14 | End: 2020-02-06 | Stop reason: SDUPTHER

## 2019-11-13 RX ORDER — ZOLPIDEM TARTRATE 10 MG/1
10 TABLET ORAL
Qty: 90 TAB | Refills: 0 | Status: SHIPPED | OUTPATIENT
Start: 2019-11-14 | End: 2020-02-06 | Stop reason: SDUPTHER

## 2019-11-13 RX ORDER — LEVOTHYROXINE SODIUM 88 UG/1
88 TABLET ORAL
Qty: 90 TAB | Refills: 3 | Status: SHIPPED | OUTPATIENT
Start: 2019-11-13 | End: 2020-02-06 | Stop reason: SDUPTHER

## 2019-11-13 RX ORDER — TAMSULOSIN HYDROCHLORIDE 0.4 MG/1
0.8 CAPSULE ORAL
Qty: 180 CAP | Refills: 3 | Status: SHIPPED | OUTPATIENT
Start: 2019-11-13 | End: 2020-02-06 | Stop reason: SDUPTHER

## 2019-11-13 RX ORDER — FLUOXETINE HYDROCHLORIDE 20 MG/1
20 CAPSULE ORAL DAILY
Qty: 90 CAP | Refills: 3 | Status: SHIPPED | OUTPATIENT
Start: 2019-11-13 | End: 2020-02-06 | Stop reason: SDUPTHER

## 2019-11-13 RX ORDER — LISINOPRIL 5 MG/1
5 TABLET ORAL DAILY
Qty: 90 TAB | Refills: 0 | Status: SHIPPED | OUTPATIENT
Start: 2019-11-13 | End: 2020-02-06 | Stop reason: SDUPTHER

## 2019-11-13 NOTE — ASSESSMENT & PLAN NOTE
Chronic health problem moderately well controlled with zolpidem 10 mg daily.  Even on 10 mg he typically only gets 2 to 3 hours of sleep.  He will continue with that medication long-term.  Obtained and reviewed patient utilization report from [facility] pharmacy database on 11/13/2019 3:20 PM  prior to writing prescription for controlled substance II, III or IV per [place] bill . Based on assessment of the report,my physical exam if necessary and the patient's health problem, the prescription is medically necessary.

## 2019-11-13 NOTE — PROGRESS NOTES
CC:Diagnoses of Malignant mesothelioma of pleura (HCC), Disc disease with myelopathy, lumbar, Primary insomnia, Essential hypertension, and Moderate episode of recurrent major depressive disorder (HCC) were pertinent to this visit.    HISTORY OF PRESENT ILLNESS: Patient is a 64 y.o. male established patient who presents today to talk about his chronic health problems and to get refills of his pain meds as well as his zolpidem for sleep.  Unfortunately this patient was intolerant of the chemotherapy for his mesothelioma.  He excepts that he is now terminal.  We are trying to keep him comfortable and being as physically active as possible at this stage.  Patient has wonderful support from his family and his spouse.      Malignant mesothelioma of pleura (HCC)  This is a chronic health problem for this patient for which we now have him on 100 mg 4 times a day to try and control his pain.. He is due for refill at this time on his tramadol and we will provide that today.  Obtained and reviewed patient utilization report from [facility] [place] pharmacy database on 11/13/2019 3:05 PM  prior to writing prescription for controlled substance II, III or IV per [place] bill . Based on assessment of the report,my physical exam if necessary and the patient's health problem, the prescription is medically necessary.         Essential hypertension  This is a chronic health problem that is well controlled with current medications and lifestyle measures.  I am concerned because his blood pressure is too low at 102/64.  He is on both a beta-blocker as well as an ACE inhibitor.  Were going to continue the metoprolol 25 mg twice daily but were going to lower his lisinopril to 5 mg only in the morning.  We will see if his blood pressures come up.    Moderate episode of recurrent major depressive disorder (HCC)  This is a chronic health problem for this patient that probably is not helped by the fact he now has malignant mesothelioma.  We  have him on fluoxetine 20 mg daily.  He is at least able to get through his day and is not breaking out in tears all the time.  He may need more medication in the future.  For now we will continue at current dose.    Primary insomnia  Chronic health problem moderately well controlled with zolpidem 10 mg daily.  Even on 10 mg he typically only gets 2 to 3 hours of sleep.  He will continue with that medication long-term.  Obtained and reviewed patient utilization report from [facility] pharmacy database on 11/13/2019 3:20 PM  prior to writing prescription for controlled substance II, III or IV per [place] bill . Based on assessment of the report,my physical exam if necessary and the patient's health problem, the prescription is medically necessary.       Patient Active Problem List    Diagnosis Date Noted   • Malignant mesothelioma of pleura (HCC) 03/11/2019   • S/P ablation of atrial flutter 01/10/2019   • Statin intolerance 01/10/2019   • Typical atrial flutter ([facility]) 01/09/2019   • Controlled substance agreement signed 01/03/2019   • Primary insomnia 01/03/2019   • Health care maintenance 01/03/2019   • Moderate episode of recurrent major depressive disorder (HCC) 10/18/2011   • Tobacco abuse disorder 10/18/2011   • Vitamin D deficiency 06/04/2010   • Displacement of lumbar intervertebral disc without myelopathy    • Essential hypertension    • Mixed hyperlipidemia    • Esophageal reflux    • Disturbance in sleep behavior    • Palpitations    • Disc disease with myelopathy, lumbar       Allergies:Atorvastatin    Current Outpatient Medications   Medication Sig Dispense Refill   • [START ON 11/14/2019] tramadol (ULTRAM) 50 MG Tab Take 2 Tabs by mouth 4 times a day for 90 days. 720 Tab 0   • [START ON 11/14/2019] zolpidem (AMBIEN) 10 MG Tab Take 1 Tab by mouth every bedtime for 90 days. 90 Tab 0   • tamsulosin (FLOMAX) 0.4 MG capsule Take 2 Caps by mouth every bedtime. 180 Cap 3   • metoprolol (LOPRESSOR) 25 MG  Tab Take 1 Tab by mouth 2 times a day. 180 Tab 3   • levothyroxine (SYNTHROID) 88 MCG Tab Take 1 Tab by mouth Every morning on an empty stomach. 90 Tab 3   • lisinopril (PRINIVIL) 5 MG Tab Take 1 Tab by mouth every day for 90 days. 90 Tab 0   • FLUoxetine (PROZAC) 20 MG Cap Take 1 Cap by mouth every day for 90 days. 90 Cap 3   • magnesium oxide (MAG-OX) 400 MG Tab Take 400 mg by mouth every day.     • NON SPECIFIED Hill and Smooth Enzyme Formula, daily     • Potassium Gluconate 550 (90 K) MG Tab Take 1 Tab by mouth 2 Times a Day.     • Calcium-Magnesium-Vitamin D (CALCIUM 1200+D3 PO) Take 1 Cap by mouth 2 Times a Day.     • Zinc 50 MG Cap Take 50 mg by mouth 2 Times a Day.     • NON SPECIFIED Healthy CuraMed, twice a day       No current facility-administered medications for this visit.        Social History     Tobacco Use   • Smoking status: Never Smoker   • Smokeless tobacco: Current User     Types: Chew   • Tobacco comment: 1 can every 2 days for 30 years (2009)   Substance Use Topics   • Alcohol use: No   • Drug use: No     Social History     Patient does not qualify to have social determinant information on file (likely too young).   Social History Narrative   • Not on file       Family History   Problem Relation Age of Onset   • Hypertension Mother    • Arthritis Mother    • Heart Disease Mother         atrial fibrillation   • Diabetes Father    • Cancer Father         PROSTATE CA   • Heart Disease Father         CAD   • Hypertension Father         ROS:     - Constitutional: Patient complains of fatigue and generalized weakness on a daily basis, denies fevers chills or unexpected weight loss.     - HEENT:  Negative for headaches, vision changes, hearing changes, ear pain, ear discharge, rhinorrhea, sinus congestion, sore throat, and neck pain.      - Respiratory: Negative for cough, sputum production, chest congestion, dyspnea, wheezing, and crackles.      - Cardiovascular: Negative for chest pain,  "palpitations, orthopnea, and bilateral lower extremity edema.     - Gastrointestinal: Negative for heartburn, nausea, vomiting, abdominal pain, hematochezia, melena, diarrhea, constipation, and greasy/foul-smelling stools.     - Genitourinary: Negative for dysuria, polyuria, hematuria, pyuria, urinary urgency, and urinary incontinence.     - Musculoskeletal: Patient living with chronic back pain and hip pain along with knee pain on a daily basis.     - Skin: Negative for rash, itching, cyanotic skin color change.     - Neurological: Negative for dizziness, tingling, tremors, focal sensory deficit, focal weakness and headaches.     - Endo/Heme/Allergies: Does not bruise/bleed easily.     - Psychiatric/Behavioral: Negative for depression, suicidal/homicidal ideation and memory loss.          - NOTE: All other systems reviewed and are negative, except as in HPI.      Exam:    /64 (BP Location: Left arm, Patient Position: Sitting, BP Cuff Size: Adult)   Pulse (!) 55   Temp 36.6 °C (97.8 °F) (Temporal)   Resp 12   Ht 1.753 m (5' 9\")   Wt 81.1 kg (178 lb 12.7 oz)   SpO2 98%  Body mass index is 26.4 kg/m².    General:  Well nourished, well developed male in NAD  Head is grossly normal.  Neck: Supple without JVD or bruit. Thyroid is not enlarged.  Pulmonary: Clear to ausculation and percussion.  Normal effort. No rales, ronchi, or wheezing.  Cardiovascular: Regular rate and rhythm without murmur. Carotid and radial pulses are intact and equal bilaterally.  Extremities: no clubbing, cyanosis, or edema.  Lumbar spine films: 11/12/2019: Results reviewed and questions answered.  Please note that this dictation was created using voice recognition software. I have made every reasonable attempt to correct obvious errors, but I expect that there are errors of grammar and possibly content that I did not discover before finalizing the note.    Assessment/Plan:  1. Malignant mesothelioma of pleura (HCC)  Uncontrolled and " progressing.  Patient continuing to get by with tramadol for pain control.  - tramadol (ULTRAM) 50 MG Tab; Take 2 Tabs by mouth 4 times a day for 90 days.  Dispense: 720 Tab; Refill: 0    2. Disc disease with myelopathy, lumbar  Controlled, patient utilizing tramadol for pain control  - tramadol (ULTRAM) 50 MG Tab; Take 2 Tabs by mouth 4 times a day for 90 days.  Dispense: 720 Tab; Refill: 0    3. Primary insomnia  Adequately controlled with zolpidem.  Renewed provided today.  - zolpidem (AMBIEN) 10 MG Tab; Take 1 Tab by mouth every bedtime for 90 days.  Dispense: 90 Tab; Refill: 0    4. Essential hypertension  Controlled, continue with current meds and lifestyle.      5. Moderate episode of recurrent major depressive disorder (HCC)  Controlled, continue with current meds and lifestyle.

## 2019-11-13 NOTE — ASSESSMENT & PLAN NOTE
This is a chronic health problem for this patient for which we now have him on 100 mg 4 times a day to try and control his pain.. He is due for refill at this time on his tramadol and we will provide that today.  Obtained and reviewed patient utilization report from [facility] pharmacy database on 11/13/2019 3:05 PM  prior to writing prescription for controlled substance II, III or IV per [place] bill . Based on assessment of the report,my physical exam if necessary and the patient's health problem, the prescription is medically necessary.

## 2019-11-13 NOTE — ASSESSMENT & PLAN NOTE
This is a chronic health problem for this patient that probably is not helped by the fact he now has malignant mesothelioma.  We have him on fluoxetine 20 mg daily.  He is at least able to get through his day and is not breaking out in tears all the time.  He may need more medication in the future.  For now we will continue at current dose.

## 2019-11-13 NOTE — ASSESSMENT & PLAN NOTE
This is a chronic health problem that is well controlled with current medications and lifestyle measures.  I am concerned because his blood pressure is too low at 102/64.  He is on both a beta-blocker as well as an ACE inhibitor.  Were going to continue the metoprolol 25 mg twice daily but were going to lower his lisinopril to 5 mg only in the morning.  We will see if his blood pressures come up.

## 2020-01-30 ENCOUNTER — TELEPHONE (OUTPATIENT)
Dept: MEDICAL GROUP | Facility: MEDICAL CENTER | Age: 65
End: 2020-01-30

## 2020-01-30 DIAGNOSIS — H25.813 COMBINED FORMS OF AGE-RELATED CATARACT OF BOTH EYES: ICD-10-CM

## 2020-01-30 NOTE — TELEPHONE ENCOUNTER
Caller Name: Gustavo with [facility]  Call Back Number:[phone]    Pt was seen today with [facility] and they determined he needs cataract surgery. But then they realized he needs a referral from his insurance.    They requested in the referral to put the DX as H25.813. and the CPT codes [place] [id number]. They are also requesting to put in the referral testing needed.

## 2020-02-04 PROBLEM — H25.813 COMBINED FORMS OF AGE-RELATED CATARACT OF BOTH EYES: Status: ACTIVE | Noted: 2020-02-04

## 2020-02-04 NOTE — ASSESSMENT & PLAN NOTE
This patient is to the point that he needs to have cataracts removed.  He needs a referral placed for [facility] so that they can get him set up for evaluation and surgery.  Referral placed today with an urgent request.

## 2020-02-06 ENCOUNTER — OFFICE VISIT (OUTPATIENT)
Dept: MEDICAL GROUP | Facility: MEDICAL CENTER | Age: 65
End: 2020-02-06
Payer: COMMERCIAL

## 2020-02-06 VITALS
TEMPERATURE: 98 F | BODY MASS INDEX: 27.43 KG/M2 | WEIGHT: 185.19 LBS | RESPIRATION RATE: 12 BRPM | SYSTOLIC BLOOD PRESSURE: 122 MMHG | HEART RATE: 61 BPM | HEIGHT: 69 IN | OXYGEN SATURATION: 92 % | DIASTOLIC BLOOD PRESSURE: 64 MMHG

## 2020-02-06 DIAGNOSIS — C45.0 MALIGNANT MESOTHELIOMA OF PLEURA (HCC): ICD-10-CM

## 2020-02-06 DIAGNOSIS — R14.0 ABDOMINAL DISTENSION (GASEOUS): ICD-10-CM

## 2020-02-06 DIAGNOSIS — M51.06 DISC DISEASE WITH MYELOPATHY, LUMBAR: ICD-10-CM

## 2020-02-06 DIAGNOSIS — R14.0 ABDOMINAL DISTENTION, NON-GASEOUS: ICD-10-CM

## 2020-02-06 DIAGNOSIS — F51.01 PRIMARY INSOMNIA: ICD-10-CM

## 2020-02-06 PROCEDURE — 99214 OFFICE O/P EST MOD 30 MIN: CPT | Performed by: FAMILY MEDICINE

## 2020-02-06 RX ORDER — TAMSULOSIN HYDROCHLORIDE 0.4 MG/1
0.8 CAPSULE ORAL
Qty: 180 CAP | Refills: 3 | Status: SHIPPED | OUTPATIENT
Start: 2020-02-06 | End: 2020-05-06 | Stop reason: SDUPTHER

## 2020-02-06 RX ORDER — LEVOTHYROXINE SODIUM 88 UG/1
88 TABLET ORAL
Qty: 90 TAB | Refills: 3 | Status: SHIPPED | OUTPATIENT
Start: 2020-02-06 | End: 2020-05-06 | Stop reason: SDUPTHER

## 2020-02-06 RX ORDER — TRAMADOL HYDROCHLORIDE 50 MG/1
100 TABLET ORAL 4 TIMES DAILY
Qty: 720 TAB | Refills: 0 | Status: SHIPPED | OUTPATIENT
Start: 2020-02-06 | End: 2020-05-06 | Stop reason: SDUPTHER

## 2020-02-06 RX ORDER — PLANT STANOL ESTER 450 MG
1 TABLET ORAL 2 TIMES DAILY
Qty: 180 TAB | Refills: 3 | Status: SHIPPED | OUTPATIENT
Start: 2020-02-06 | End: 2020-05-06

## 2020-02-06 RX ORDER — LISINOPRIL 5 MG/1
5 TABLET ORAL DAILY
Qty: 90 TAB | Refills: 3 | Status: SHIPPED | OUTPATIENT
Start: 2020-02-06 | End: 2020-05-06 | Stop reason: SDUPTHER

## 2020-02-06 RX ORDER — FLUOXETINE HYDROCHLORIDE 20 MG/1
20 CAPSULE ORAL DAILY
Qty: 90 CAP | Refills: 3 | Status: SHIPPED | OUTPATIENT
Start: 2020-02-06 | End: 2020-05-06 | Stop reason: SDUPTHER

## 2020-02-06 RX ORDER — ZOLPIDEM TARTRATE 10 MG/1
10 TABLET ORAL
Qty: 90 TAB | Refills: 0 | Status: SHIPPED | OUTPATIENT
Start: 2020-02-06 | End: 2020-05-06 | Stop reason: SDUPTHER

## 2020-02-06 NOTE — ASSESSMENT & PLAN NOTE
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  He also is starting to experience some shortness of breath.  Were currently utilizing Ultram to try and help with the back pain but the shortness of breath has me concerned that his pleural disease is progressing.  We will check a new CT scan

## 2020-02-06 NOTE — ASSESSMENT & PLAN NOTE
This patient is having increased problems with abdominal distention and is very concerned that it may be metastases now to the abdomen and the other concern would be is he developing ascites.  Patient has been having early satiety and discomfort and was surprised that he had gained 6 pounds.

## 2020-02-06 NOTE — ASSESSMENT & PLAN NOTE
This is a chronic health problem for this patient for which he utilizes zolpidem 10 mg nightly.  This allows him to get to sleep but does not really keep him asleep.  He is having to use other over-the-counter medications along with THC.  This patient is considered terminal therefore it is okay for him to use anything that helps him to sleep.  Obtained and reviewed patient utilization report from [facility] pharmacy database on 2/6/2020 1:24 PM  prior to writing prescription for controlled substance II, III or IV per [place] bill . Based on assessment of the report,my physical exam if necessary and the patient's health problem, the prescription is medically necessary.

## 2020-02-06 NOTE — ASSESSMENT & PLAN NOTE
This is a chronic health problem for this patient where he is not able to tolerate his chemotherapy.  He is now developed abdominal bloating and has gained 6 pounds although he is not eating anymore.  The concern would be has he developed ascites.  He is constantly feeling bloated he is also noted early satiety as well.  I think we need to get an abdominal CT scan with and without contrast and will go all the way down into his pelvis to look for metastases or the possibility of ascites. Pt is also requesting a new chest CT since he is not on a more concerned about how far this is progressed.

## 2020-02-07 NOTE — PROGRESS NOTES
CC:Diagnoses of Malignant mesothelioma of pleura (HCC), Disc disease with myelopathy, lumbar, Primary insomnia, Abdominal distension (gaseous), and Abdominal distention, non-gaseous were pertinent to this visit.    HISTORY OF PRESENT ILLNESS: Patient is a 64 y.o. male established patient who presents today to talk about his chronic health problems and discomfort that he is having in his abdomen as well as abdominal distention.      Malignant mesothelioma of pleura (HCC)  This is a chronic health problem for this patient where he is not able to tolerate his chemotherapy.  He is now developed abdominal bloating and has gained 6 pounds although he is not eating anymore.  The concern would be has he developed ascites.  He is constantly feeling bloated he is also noted early satiety as well.  I think we need to get an abdominal CT scan with and without contrast and will go all the way down into his pelvis to look for metastases or the possibility of ascites. Pt is also requesting a new chest CT since he is not on a more concerned about how far this is progressed.    Disc disease with myelopathy, lumbar  This is a chronic health problem for this patient where he continues to have failed back syndrome and back pain.  He also is starting to experience some shortness of breath.  Were currently utilizing Ultram to try and help with the back pain but the shortness of breath has me concerned that his pleural disease is progressing.  We will check a new CT scan    Primary insomnia  This is a chronic health problem for this patient for which he utilizes zolpidem 10 mg nightly.  This allows him to get to sleep but does not really keep him asleep.  He is having to use other over-the-counter medications along with THC.  This patient is considered terminal therefore it is okay for him to use anything that helps him to sleep.  Obtained and reviewed patient utilization report from [facility] pharmacy database on 2/6/2020 1:24 PM   prior to writing prescription for controlled substance II, III or IV per [place] bill . Based on assessment of the report,my physical exam if necessary and the patient's health problem, the prescription is medically necessary.       Abdominal distention, non-gaseous  This patient is having increased problems with abdominal distention and is very concerned that it may be metastases now to the abdomen and the other concern would be is he developing ascites.  Patient has been having early satiety and discomfort and was surprised that he had gained 6 pounds.      Patient Active Problem List    Diagnosis Date Noted   • Abdominal distention, non-gaseous 02/06/2020   • Combined forms of age-related cataract of both eyes 02/04/2020   • Malignant mesothelioma of pleura (HCC) 03/11/2019   • S/P ablation of atrial flutter 01/10/2019   • Statin intolerance 01/10/2019   • Typical atrial flutter (HCC) 01/09/2019   • Controlled substance agreement signed 01/03/2019   • Primary insomnia 01/03/2019   • Health care maintenance 01/03/2019   • Moderate episode of recurrent major depressive disorder (HCC) 10/18/2011   • Tobacco abuse disorder 10/18/2011   • Vitamin D deficiency 06/04/2010   • Displacement of lumbar intervertebral disc without myelopathy    • Essential hypertension    • Mixed hyperlipidemia    • Esophageal reflux    • Disturbance in sleep behavior    • Palpitations    • Disc disease with myelopathy, lumbar       Allergies:Atorvastatin    Current Outpatient Medications   Medication Sig Dispense Refill   • FLUoxetine (PROZAC) 20 MG Cap Take 1 Cap by mouth every day for 90 days. 90 Cap 3   • levothyroxine (SYNTHROID) 88 MCG Tab Take 1 Tab by mouth Every morning on an empty stomach. 90 Tab 3   • lisinopril (PRINIVIL) 5 MG Tab Take 1 Tab by mouth every day for 90 days. 90 Tab 3   • metoprolol (LOPRESSOR) 25 MG Tab Take 1 Tab by mouth 2 times a day. 180 Tab 3   • Potassium Gluconate 550 (90 K) MG Tab Take 1 Tab by mouth 2  Times a Day. 180 Tab 3   • tamsulosin (FLOMAX) 0.4 MG capsule Take 2 Caps by mouth every bedtime. 180 Cap 3   • tramadol (ULTRAM) 50 MG Tab Take 2 Tabs by mouth 4 times a day for 90 days. 720 Tab 0   • zolpidem (AMBIEN) 10 MG Tab Take 1 Tab by mouth every bedtime for 90 days. 90 Tab 0   • magnesium oxide (MAG-OX) 400 MG Tab Take 400 mg by mouth every day.     • NON SPECIFIED Hill [employer] Smooth Enzyme Formula, daily     • Calcium-Magnesium-Vitamin D (CALCIUM 1200+D3 PO) Take 1 Cap by mouth 2 Times a Day.     • Zinc 50 MG Cap Take 50 mg by mouth 2 Times a Day.     • NON SPECIFIED Healthy CuraMed, twice a day       No current facility-administered medications for this visit.        Social History     Tobacco Use   • Smoking status: Never Smoker   • Smokeless tobacco: Current User     Types: Chew   • Tobacco comment: 1 can every 2 days for 30 years (2009)   Substance Use Topics   • Alcohol use: No   • Drug use: No     Social History     Patient does not qualify to have social determinant information on file (likely too young).   Social History Narrative   • Not on file       Family History   Problem Relation Age of Onset   • Hypertension Mother    • Arthritis Mother    • Heart Disease Mother         atrial fibrillation   • Diabetes Father    • Cancer Father         PROSTATE CA   • Heart Disease Father         CAD   • Hypertension Father         ROS:     - Constitutional: Patient complains of malaise and fatigue on generalized weakness on a daily basis.  He finds that it is a rare day that he can make it through the whole day without taking a nap now.    - HEENT:  Negative for headaches, vision changes, hearing changes, ear pain, ear discharge, rhinorrhea, sinus congestion, sore throat, and neck pain.      - Respiratory: Patient having some intermittent shortness of breath more than what he would expect with activity.  Denies cough chest congestion or wheezing.       - Cardiovascular: Negative for chest pain, palpitations,  "orthopnea, and bilateral lower extremity edema.     - Gastrointestinal: Positive for early satiety and increased abdominal distention without pain and no change in bowel habits.      - Genitourinary: Negative for dysuria, polyuria, hematuria, pyuria, urinary urgency, and urinary incontinence.     - Musculoskeletal: Continued back and neck pain on a daily basis.      - Skin: Negative for rash, itching, cyanotic skin color change.     - Neurological: Negative for dizziness, tingling, tremors, focal sensory deficit, focal weakness and headaches.     - Endo/Heme/Allergies: Does not bruise/bleed easily.     - Psychiatric/Behavioral: Negative for depression, suicidal/homicidal ideation and memory loss.          - NOTE: All other systems reviewed and are negative, except as in HPI.      Exam:    /64   Pulse 61   Temp 36.7 °C (98 °F)   Resp 12   Ht 1.753 m (5' 9\")   Wt 84 kg (185 lb 3 oz)   SpO2 92%  Body mass index is 27.35 kg/m².    General:  Well nourished, well developed male who appears more fatigued than he has in the past.  Also has abdominal distention that is quite noticeable.  Head is grossly normal.  Neck: Supple without JVD or bruit. Thyroid is not enlarged.  Pulmonary: Clear to ausculation and percussion.  Normal effort. No rales, ronchi, or wheezing.  Cardiovascular: Regular rate and rhythm without murmur. Carotid and radial pulses are intact and equal bilaterally.  Abdomen: Positive bowel sounds throughout, no masses palpable, no hepatosplenomegaly.  Extremities: no clubbing, cyanosis, or edema.    Please note that this dictation was created using voice recognition software. I have made every reasonable attempt to correct obvious errors, but I expect that there are errors of grammar and possibly content that I did not discover before finalizing the note.    Assessment/Plan:  1. Malignant mesothelioma of pleura (HCC)  Uncontrolled, patient will continue with tramadol.  We are going to repeat his CT " scan to see if there is been progression of his disease.  We will notify him of results via [facility]  - tramadol (ULTRAM) 50 MG Tab; Take 2 Tabs by mouth 4 times a day for 90 days.  Dispense: 720 Tab; Refill: 0  - CT-CHEST (THORAX) WITH; Future    2. Disc disease with myelopathy, lumbar  Adequately controlled with tramadol which he will continue long-term  - tramadol (ULTRAM) 50 MG Tab; Take 2 Tabs by mouth 4 times a day for 90 days.  Dispense: 720 Tab; Refill: 0    3. Primary insomnia  Adequately controlled with zolpidem renewal provided today.  - zolpidem (AMBIEN) 10 MG Tab; Take 1 Tab by mouth every bedtime for 90 days.  Dispense: 90 Tab; Refill: 0    4. Abdominal distension (gaseous)  Uncontrolled, will check CT abdomen and pelvis to see if he is developing ascites or metastases.  - CT-ABDOMEN WITH & W/O, PELVIS WITH; Future    5. Abdominal distention, non-gaseous  Plan as per #4 above

## 2020-02-10 ENCOUNTER — PATIENT MESSAGE (OUTPATIENT)
Dept: MEDICAL GROUP | Facility: MEDICAL CENTER | Age: 65
End: 2020-02-10

## 2020-02-10 DIAGNOSIS — I10 ESSENTIAL HYPERTENSION: ICD-10-CM

## 2020-02-11 NOTE — TELEPHONE ENCOUNTER
From: Marcial Morrison  To: Kandi Rangel M.D.  Sent: 2/10/2020 1:52 PM PST  Subject: Procedure Question    Good Afternoon,  I have Marcial's CT scan appointments set but they could not find an order for the lab work which needs to be done ahead of the appointment.   Thanks so much. Have a great day!  Maryellen  [phone]

## 2020-02-13 ENCOUNTER — HOSPITAL ENCOUNTER (OUTPATIENT)
Dept: LAB | Facility: MEDICAL CENTER | Age: 65
End: 2020-02-13
Attending: FAMILY MEDICINE
Payer: COMMERCIAL

## 2020-02-13 DIAGNOSIS — I10 ESSENTIAL HYPERTENSION: ICD-10-CM

## 2020-02-13 LAB
ALBUMIN SERPL BCP-MCNC: 3.9 G/DL (ref 3.2–4.9)
ALBUMIN/GLOB SERPL: 1.3 G/DL
ALP SERPL-CCNC: 90 U/L (ref 30–99)
ALT SERPL-CCNC: 11 U/L (ref 2–50)
ANION GAP SERPL CALC-SCNC: 9 MMOL/L (ref 0–11.9)
AST SERPL-CCNC: 17 U/L (ref 12–45)
BILIRUB SERPL-MCNC: 0.4 MG/DL (ref 0.1–1.5)
BUN SERPL-MCNC: 14 MG/DL (ref 8–22)
CALCIUM SERPL-MCNC: 8.9 MG/DL (ref 8.5–10.5)
CHLORIDE SERPL-SCNC: 103 MMOL/L (ref 96–112)
CO2 SERPL-SCNC: 26 MMOL/L (ref 20–33)
CREAT SERPL-MCNC: 0.96 MG/DL (ref 0.5–1.4)
GLOBULIN SER CALC-MCNC: 3.1 G/DL (ref 1.9–3.5)
GLUCOSE SERPL-MCNC: 86 MG/DL (ref 65–99)
POTASSIUM SERPL-SCNC: 4.2 MMOL/L (ref 3.6–5.5)
PROT SERPL-MCNC: 7 G/DL (ref 6–8.2)
SODIUM SERPL-SCNC: 138 MMOL/L (ref 135–145)

## 2020-02-13 PROCEDURE — 36415 COLL VENOUS BLD VENIPUNCTURE: CPT

## 2020-02-13 PROCEDURE — 80053 COMPREHEN METABOLIC PANEL: CPT

## 2020-02-20 ENCOUNTER — HOSPITAL ENCOUNTER (OUTPATIENT)
Dept: RADIOLOGY | Facility: MEDICAL CENTER | Age: 65
End: 2020-02-20
Attending: FAMILY MEDICINE
Payer: COMMERCIAL

## 2020-02-20 DIAGNOSIS — R14.0 ABDOMINAL DISTENSION (GASEOUS): ICD-10-CM

## 2020-02-20 DIAGNOSIS — C45.0 MALIGNANT MESOTHELIOMA OF PLEURA (HCC): ICD-10-CM

## 2020-02-20 PROCEDURE — 71260 CT THORAX DX C+: CPT

## 2020-02-20 PROCEDURE — 700117 HCHG RX CONTRAST REV CODE 255

## 2020-02-20 RX ADMIN — IOHEXOL 100 ML: 350 INJECTION, SOLUTION INTRAVENOUS at 14:35

## 2020-05-06 ENCOUNTER — OFFICE VISIT (OUTPATIENT)
Dept: MEDICAL GROUP | Facility: PHYSICIAN GROUP | Age: 65
End: 2020-05-06
Payer: COMMERCIAL

## 2020-05-06 VITALS
DIASTOLIC BLOOD PRESSURE: 80 MMHG | TEMPERATURE: 97.5 F | OXYGEN SATURATION: 95 % | BODY MASS INDEX: 27.25 KG/M2 | WEIGHT: 184 LBS | HEART RATE: 64 BPM | RESPIRATION RATE: 16 BRPM | SYSTOLIC BLOOD PRESSURE: 124 MMHG | HEIGHT: 69 IN

## 2020-05-06 DIAGNOSIS — M51.06 DISC DISEASE WITH MYELOPATHY, LUMBAR: ICD-10-CM

## 2020-05-06 DIAGNOSIS — Z72.0 TOBACCO ABUSE DISORDER: ICD-10-CM

## 2020-05-06 DIAGNOSIS — F51.01 PRIMARY INSOMNIA: ICD-10-CM

## 2020-05-06 DIAGNOSIS — M51.26 DISPLACEMENT OF LUMBAR INTERVERTEBRAL DISC WITHOUT MYELOPATHY: ICD-10-CM

## 2020-05-06 DIAGNOSIS — Z79.899 CONTROLLED SUBSTANCE AGREEMENT SIGNED: ICD-10-CM

## 2020-05-06 DIAGNOSIS — F33.1 MODERATE EPISODE OF RECURRENT MAJOR DEPRESSIVE DISORDER (HCC): ICD-10-CM

## 2020-05-06 DIAGNOSIS — C45.0 MALIGNANT MESOTHELIOMA OF PLEURA (HCC): ICD-10-CM

## 2020-05-06 PROCEDURE — 99214 OFFICE O/P EST MOD 30 MIN: CPT | Performed by: FAMILY MEDICINE

## 2020-05-06 RX ORDER — LISINOPRIL 5 MG/1
5 TABLET ORAL DAILY
Qty: 90 TAB | Refills: 3 | Status: SHIPPED | OUTPATIENT
Start: 2020-05-06 | End: 2020-07-23 | Stop reason: SDUPTHER

## 2020-05-06 RX ORDER — LEVOTHYROXINE SODIUM 88 UG/1
88 TABLET ORAL
Qty: 90 TAB | Refills: 3 | Status: SHIPPED | OUTPATIENT
Start: 2020-05-06 | End: 2022-01-07 | Stop reason: SDUPTHER

## 2020-05-06 RX ORDER — ZOLPIDEM TARTRATE 10 MG/1
10 TABLET ORAL
Qty: 90 TAB | Refills: 0 | Status: SHIPPED | OUTPATIENT
Start: 2020-05-06 | End: 2020-07-23 | Stop reason: SDUPTHER

## 2020-05-06 RX ORDER — TRAMADOL HYDROCHLORIDE 50 MG/1
100 TABLET ORAL 4 TIMES DAILY
Qty: 720 TAB | Refills: 0 | Status: SHIPPED | OUTPATIENT
Start: 2020-05-06 | End: 2020-07-06

## 2020-05-06 RX ORDER — TAMSULOSIN HYDROCHLORIDE 0.4 MG/1
0.8 CAPSULE ORAL
Qty: 180 CAP | Refills: 3 | Status: SHIPPED
Start: 2020-05-06 | End: 2022-01-01

## 2020-05-06 RX ORDER — FLUOXETINE HYDROCHLORIDE 20 MG/1
20 CAPSULE ORAL DAILY
Qty: 90 CAP | Refills: 3 | Status: SHIPPED
Start: 2020-05-06 | End: 2020-07-23 | Stop reason: SDUPTHER

## 2020-05-06 ASSESSMENT — PATIENT HEALTH QUESTIONNAIRE - PHQ9
2. FEELING DOWN, DEPRESSED, IRRITABLE, OR HOPELESS: NOT AT ALL
6. FEELING BAD ABOUT YOURSELF - OR THAT YOU ARE A FAILURE OR HAVE LET YOURSELF OR YOUR FAMILY DOWN: NOT AL ALL
9. THOUGHTS THAT YOU WOULD BE BETTER OFF DEAD, OR OF HURTING YOURSELF: NOT AT ALL
SUM OF ALL RESPONSES TO PHQ QUESTIONS 1-9: 8
4. FEELING TIRED OR HAVING LITTLE ENERGY: NEARLY EVERY DAY
1. LITTLE INTEREST OR PLEASURE IN DOING THINGS: NEARLY EVERY DAY
SUM OF ALL RESPONSES TO PHQ9 QUESTIONS 1 AND 2: 3
5. POOR APPETITE OR OVEREATING: SEVERAL DAYS
3. TROUBLE FALLING OR STAYING ASLEEP OR SLEEPING TOO MUCH: NOT AT ALL
8. MOVING OR SPEAKING SO SLOWLY THAT OTHER PEOPLE COULD HAVE NOTICED. OR THE OPPOSITE, BEING SO FIGETY OR RESTLESS THAT YOU HAVE BEEN MOVING AROUND A LOT MORE THAN USUAL: NOT AT ALL
7. TROUBLE CONCENTRATING ON THINGS, SUCH AS READING THE NEWSPAPER OR WATCHING TELEVISION: SEVERAL DAYS

## 2020-05-06 ASSESSMENT — FIBROSIS 4 INDEX: FIB4 SCORE: 1.18

## 2020-05-06 NOTE — ASSESSMENT & PLAN NOTE
This is a chronic health problem for this patient that is contributing to more shortness of breath as well as generalized pain.  Patient also has a bad back.  He takes tramadol 2 tablets 4 times a day.  It is time for renewal of this prescription. Obtained and reviewed patient utilization report from [facility] pharmacy database on 5/6/2020 10:56 AM  prior to writing prescription for controlled substance II, III or IV per [place] bill . Based on assessment of the report,my physical exam if necessary and the patient's health problem, the prescription is medically necessary.

## 2020-05-06 NOTE — ASSESSMENT & PLAN NOTE
This patient utilizes opiates to help with pain related to his back as well as mesothelioma.  It is time for us to renew his chronic controlled substance agreement.

## 2020-05-06 NOTE — ASSESSMENT & PLAN NOTE
This is a chronic health problem for this patient that is actually doing fairly well on Prozac.  Some of his answers are not is much related to depression as it is related to the fact that he has mesothelioma and it is causing him to develop more fatigue and having to be on pain meds which makes it hard for him to concentrate.  Patient absolutely does not have suicidal or homicidal ideation.  Depression Screen (PHQ-2/PHQ-9) 4/1/2019 5/6/2020   PHQ-2 Total Score 6 -   PHQ-2 Total Score - 3   PHQ-9 Total Score 13 -   PHQ-9 Total Score - 8       Interpretation of PHQ-9 Total Score   Score Severity   1-4 No Depression   5-9 Mild Depression   10-14 Moderate Depression   15-19 Moderately Severe Depression   20-27 Severe Depression

## 2020-05-06 NOTE — PROGRESS NOTES
CC:Diagnoses of Moderate episode of recurrent major depressive disorder (HCC), Malignant mesothelioma of pleura (HCC), Tobacco abuse disorder, Controlled substance agreement signed, Displacement of lumbar intervertebral disc without myelopathy, Disc disease with myelopathy, lumbar, and Primary insomnia were pertinent to this visit.    HISTORY OF PRESENT ILLNESS: Patient is a 65 y.o. male established patient who presents today to talk about his chronic health problems as well as to follow-up on his chronic pain issues related to mesothelioma as in his chronic insomnia for which he needs refills.      Moderate episode of recurrent major depressive disorder (HCC)  This is a chronic health problem for this patient that is actually doing fairly well on Prozac.  Some of his answers are not is much related to depression as it is related to the fact that he has mesothelioma and it is causing him to develop more fatigue and having to be on pain meds which makes it hard for him to concentrate.  Patient absolutely does not have suicidal or homicidal ideation.  Depression Screen (PHQ-2/PHQ-9) 4/1/2019 5/6/2020   PHQ-2 Total Score 6 -   PHQ-2 Total Score - 3   PHQ-9 Total Score 13 -   PHQ-9 Total Score - 8       Interpretation of PHQ-9 Total Score   Score Severity   1-4 No Depression   5-9 Mild Depression   10-14 Moderate Depression   15-19 Moderately Severe Depression   20-27 Severe Depression      Malignant mesothelioma of pleura (HCC)  This is a chronic health problem for this patient that is contributing to more shortness of breath as well as generalized pain.  Patient also has a bad back.  He takes tramadol 2 tablets 4 times a day.  It is time for renewal of this prescription. Obtained and reviewed patient utilization report from [facility] pharmacy database on 5/6/2020 10:56 AM  prior to writing prescription for controlled substance II, III or IV per [place] bill . Based on assessment of the report,my physical exam  if necessary and the patient's health problem, the prescription is medically necessary.       Tobacco abuse disorder  Pt is still chewing tobacco.    Controlled substance agreement signed  This patient utilizes opiates to help with pain related to his back as well as mesothelioma.  It is time for us to renew his chronic controlled substance agreement.    Displacement of lumbar intervertebral disc without myelopathy  This is a chronic health problem for this patient that is getting worse.  He is now having episodes where he will have bad back pain for maybe up to a week and then it gets better for several weeks.  I think it is related to what he is doing.  He is not doing his usual routine so I worry that he is doing unusual things that are probably putting a stress on his back.    Primary insomnia  This is a chronic health problem that the patient has to have zolpidem 10 mg at bedtime or he will not sleep at all.  He is very careful about not taking this in association with his tramadol.  He tries to separate them by 4 hours so that he does not have respiratory depression.  His wife also double checks his meds for him.  Obtained and reviewed patient utilization report from [facility] pharmacy database on 5/6/2020 12:46 PM  prior to writing prescription for controlled substance II, III or IV per [place] bill . Based on assessment of the report,my physical exam if necessary and the patient's health problem, the prescription is medically necessary.         Patient Active Problem List    Diagnosis Date Noted   • Abdominal distention, non-gaseous 02/06/2020   • Combined forms of age-related cataract of both eyes 02/04/2020   • Malignant mesothelioma of pleura (HCC) 03/11/2019   • S/P ablation of atrial flutter 01/10/2019   • Statin intolerance 01/10/2019   • Typical atrial flutter (HCC) 01/09/2019   • Controlled substance agreement signed 01/03/2019   • Primary insomnia 01/03/2019   • Health care maintenance  01/03/2019   • Moderate episode of recurrent major depressive disorder (HCC) 10/18/2011   • Tobacco abuse disorder 10/18/2011   • Vitamin D deficiency 06/04/2010   • Displacement of lumbar intervertebral disc without myelopathy    • Essential hypertension    • Mixed hyperlipidemia    • Esophageal reflux    • Disturbance in sleep behavior    • Palpitations    • Disc disease with myelopathy, lumbar       Allergies:Atorvastatin    Current Outpatient Medications   Medication Sig Dispense Refill   • tramadol (ULTRAM) 50 MG Tab Take 2 Tabs by mouth 4 times a day for 90 days. 720 Tab 0   • FLUoxetine (PROZAC) 20 MG Cap Take 1 Cap by mouth every day for 90 days. 90 Cap 3   • levothyroxine (SYNTHROID) 88 MCG Tab Take 1 Tab by mouth Every morning on an empty stomach. 90 Tab 3   • lisinopril (PRINIVIL) 5 MG Tab Take 1 Tab by mouth every day for 90 days. 90 Tab 3   • metoprolol (LOPRESSOR) 25 MG Tab Take 1 Tab by mouth 2 times a day. 180 Tab 3   • tamsulosin (FLOMAX) 0.4 MG capsule Take 2 Caps by mouth every bedtime. 180 Cap 3   • zolpidem (AMBIEN) 10 MG Tab Take 1 Tab by mouth every bedtime for 90 days. 90 Tab 0   • magnesium oxide (MAG-OX) 400 MG Tab Take 400 mg by mouth every day.     • Calcium-Magnesium-Vitamin D (CALCIUM 1200+D3 PO) Take 1 Cap by mouth 2 Times a Day.     • Zinc 50 MG Cap Take 50 mg by mouth 2 Times a Day.       No current facility-administered medications for this visit.        Social History     Tobacco Use   • Smoking status: Never Smoker   • Smokeless tobacco: Current User     Types: Chew   • Tobacco comment: 1 can every 2 days for 30 years (2009)   Substance Use Topics   • Alcohol use: No   • Drug use: No     Social History     Social History Narrative   • Not on file       Family History   Problem Relation Age of Onset   • Hypertension Mother    • Arthritis Mother    • Heart Disease Mother         atrial fibrillation   • Diabetes Father    • Cancer Father         PROSTATE CA   • Heart Disease Father   "       CAD   • Hypertension Father         ROS:     - Constitutional: Positive for fatigue and malaise denies fevers and chills.     - HEENT:  Negative for headaches, vision changes, hearing changes, ear pain, ear discharge, rhinorrhea, sinus congestion, sore throat, and neck pain.      - Respiratory: Patient noting some increased shortness of breath secondary to the mesothelioma in his tumor.  Denies cough, sputum production or wheezing.      - Cardiovascular: Negative for chest pain, palpitations, orthopnea, and bilateral lower extremity edema.     - Gastrointestinal: Patient reports that he has not had much in the way of appetite but he is not losing weight.  Denies nausea vomiting or change in bowel habits.     - Genitourinary: Negative for dysuria, polyuria, hematuria, pyuria, urinary urgency, and urinary incontinence.     - Musculoskeletal: Negative for myalgias, back pain, and joint pain.     - Skin: Negative for rash, itching, cyanotic skin color change.     - Neurological: Negative for dizziness, tingling, tremors, focal sensory deficit, focal weakness and headaches.     - Endo/Heme/Allergies: Does not bruise/bleed easily.     - Psychiatric/Behavioral: Patient scores as if he has some worsening depression but I think it has more to do with the progression of his mesothelioma.  He denies suicidal or homicidal ideation.           - NOTE: All other systems reviewed and are negative, except as in HPI.      Exam:    /80 (BP Location: Right arm, Patient Position: Sitting, BP Cuff Size: Adult)   Pulse 64   Temp 36.4 °C (97.5 °F) (Temporal)   Resp 16   Ht 1.753 m (5' 9\")   Wt 83.5 kg (184 lb)   SpO2 95%  Body mass index is 27.17 kg/m².    General:  Well nourished, male who appears more fatigued than usual head is grossly normal.  Neck: Supple without JVD or bruit. Thyroid is not enlarged.  Pulmonary: Clear to ausculation and percussion.  Normal effort. No rales, ronchi, or wheezing.  Cardiovascular: " Regular rate and rhythm without murmur. Carotid and radial pulses are intact and equal bilaterally.  Extremities: no clubbing, cyanosis, or edema.    Please note that this dictation was created using voice recognition software. I have made every reasonable attempt to correct obvious errors, but I expect that there are errors of grammar and possibly content that I did not discover before finalizing the note.    Assessment/Plan:  1. Moderate episode of recurrent major depressive disorder (HCC)  Stable, patient will continue with Prozac    2. Malignant mesothelioma of pleura (HCC)  Patient is showing some progression of his mesothelioma increasing shortness of breath and increasing pain.  We may eventually have to move up to a higher dose opiate.  - tramadol (ULTRAM) 50 MG Tab; Take 2 Tabs by mouth 4 times a day for 90 days.  Dispense: 720 Tab; Refill: 0  - Controlled Substance Treatment Agreement    3. Tobacco abuse disorder  Patient continues to chew daily    4. Controlled substance agreement signed  Patient signed a new controlled substance agreement today for the coming year.  - Controlled Substance Treatment Agreement    5. Displacement of lumbar intervertebral disc without myelopathy  Adequately controlled with current med  - Controlled Substance Treatment Agreement    6. Disc disease with myelopathy, lumbar  Adequately controlled with current meds  - tramadol (ULTRAM) 50 MG Tab; Take 2 Tabs by mouth 4 times a day for 90 days.  Dispense: 720 Tab; Refill: 0    7. Primary insomnia  We will renew the patient's zolpidem for the coming 3 months.  We will see them back at the end of that time.  Obtained and reviewed patient utilization report from [facility] pharmacy database on 5/6/2020 10:54 PM  prior to writing prescription for controlled substance II, III or IV per [place] bill . Based on assessment of the report,my physical exam if necessary and the patient's health problem, the prescription is medically  necessary.     - zolpidem (AMBIEN) 10 MG Tab; Take 1 Tab by mouth every bedtime for 90 days.  Dispense: 90 Tab; Refill: 0          79 yo M AAOx2 (person, situation) received to [facility] for SOB, pt unable to elaborate on details but states "I'm here for shortness of breath," pt arrives tachypneic, resps even and labored, placed on 4L cannula for 95% saturation, NSR on CM, endorses no further complaints, presents with nonproductive cough, denies fevers CP palpitations HA dizziness weakness, #18 placed to L forearm and RAC, pt pending CXR, report given to primary RN Malissa

## 2020-05-06 NOTE — ASSESSMENT & PLAN NOTE
This is a chronic health problem that the patient has to have zolpidem 10 mg at bedtime or he will not sleep at all.  He is very careful about not taking this in association with his tramadol.  He tries to separate them by 4 hours so that he does not have respiratory depression.  His wife also double checks his meds for him.  Obtained and reviewed patient utilization report from [facility] pharmacy database on 5/6/2020 12:46 PM  prior to writing prescription for controlled substance II, III or IV per [place] bill . Based on assessment of the report,my physical exam if necessary and the patient's health problem, the prescription is medically necessary.

## 2020-05-06 NOTE — ASSESSMENT & PLAN NOTE
This is a chronic health problem for this patient that is getting worse.  He is now having episodes where he will have bad back pain for maybe up to a week and then it gets better for several weeks.  I think it is related to what he is doing.  He is not doing his usual routine so I worry that he is doing unusual things that are probably putting a stress on his back.

## 2020-05-23 ENCOUNTER — PATIENT MESSAGE (OUTPATIENT)
Dept: MEDICAL GROUP | Facility: PHYSICIAN GROUP | Age: 65
End: 2020-05-23

## 2020-05-27 RX ORDER — PREDNISONE 20 MG/1
20 TABLET ORAL DAILY
Qty: 30 TAB | Refills: 0 | Status: SHIPPED
Start: 2020-05-27 | End: 2020-06-02 | Stop reason: SDUPTHER

## 2020-05-28 ENCOUNTER — PATIENT MESSAGE (OUTPATIENT)
Dept: MEDICAL GROUP | Facility: PHYSICIAN GROUP | Age: 65
End: 2020-05-28

## 2020-06-01 ENCOUNTER — PATIENT MESSAGE (OUTPATIENT)
Dept: MEDICAL GROUP | Facility: PHYSICIAN GROUP | Age: 65
End: 2020-06-01

## 2020-06-01 NOTE — TELEPHONE ENCOUNTER
From: Marcial Morrison  To: Kandi Rangel M.D.  Sent: 5/28/2020 4:52 PM PDT  Subject: Prescription Question    I called the pharmacy and they don't have the prescription order....just checking to make sure it gets sent to [facility]'s in [place] . Maybe they lost it!  And of course I will check in with you after I have taken it for a couple of weeks.   Thank you so much!  Lio      ----- Message -----   From:Kandi Rangel M.D.   Sent:5/27/2020 10:20 AM PDT   To:Marcial Morrison   Subject:RE: Prescription Question    Hi Marcial  I think we go ahead and try it. Let us start with just prednisone 20 mg daily and leave you on that dose. Can you send me a MyChart after 2 weeks and let me know if it is making any difference? I will put in the order today.     Take care, Dr. Chau      ----- Message -----   From:Marcial Morrison   Sent:5/23/2020 8:15 AM PDT   To:Kandi Rangel M.D.   Subject:Prescription Question    As we discussed at my last visit, my fatigue is getting much worse. I would really like to try taking a steroid to see if it will help with the fatigue issue. We discussed the concern of prescribing this because of the Covid 19, but I'm social distancing and I'm not really worried about the risk.   I'm very interested in seeing if this would help and giving it a try before our next appointment in July.   What are your thoughts?  Thanks so much  Marcial

## 2020-06-02 RX ORDER — PREDNISONE 20 MG/1
20 TABLET ORAL DAILY
Qty: 30 TAB | Refills: 0 | Status: SHIPPED | OUTPATIENT
Start: 2020-06-02 | End: 2020-07-23

## 2020-06-02 NOTE — TELEPHONE ENCOUNTER
From: Marcial Morrison  To: Kandi Rangel M.D.  Sent: 6/1/2020 3:08 PM PDT  Subject: Prescription Question    They said they still don't have it..they said they would call your office.      ----- Message -----   From:Kandi Rangel M.D.   Sent:6/1/2020 3:02 PM PDT   To:Marcial Morrison   Subject:RE: Prescription Question    Did they ever get the prescription. I double checked that I did send it to [facility] in [place].      ----- Message -----   From:Marcial Morrison   Sent:5/28/2020 4:52 PM PDT   To:Kandi Rangel M.D.   Subject:Prescription Question    I called the pharmacy and they don't have the prescription order....just checking to make sure it gets sent to [facility] in [place] . Maybe they lost it!  And of course I will check in with you after I have taken it for a couple of weeks.   Thank you so much!  Lio      ----- Message -----   From:Kandi Rangel M.D.   Sent:5/27/2020 10:20 AM PDT   To:Marcial Morrison   Subject:RE: Prescription Question    Hi Marcial  I think we go ahead and try it. Let us start with just prednisone 20 mg daily and leave you on that dose. Can you send me a MyChart after 2 weeks and let me know if it is making any difference? I will put in the order today.     Take care, Dr. Chau      ----- Message -----   From:Marcial Morrison   Sent:5/23/2020 8:15 AM PDT   To:Kandi Rangel M.D.   Subject:Prescription Question    As we discussed at my last visit, my fatigue is getting much worse. I would really like to try taking a steroid to see if it will help with the fatigue issue. We discussed the concern of prescribing this because of the Covid 19, but I'm social distancing and I'm not really worried about the risk.   I'm very interested in seeing if this would help and giving it a try before our next appointment in July.   What are your thoughts?  Thanks so much  Marcial

## 2020-06-23 ENCOUNTER — PATIENT MESSAGE (OUTPATIENT)
Dept: MEDICAL GROUP | Facility: PHYSICIAN GROUP | Age: 65
End: 2020-06-23

## 2020-07-06 DIAGNOSIS — M54.50 ACUTE MIDLINE LOW BACK PAIN WITHOUT SCIATICA: ICD-10-CM

## 2020-07-06 DIAGNOSIS — C45.0 MESOTHELIOMA (PLEURAL) (HCC): ICD-10-CM

## 2020-07-06 RX ORDER — OXYCODONE AND ACETAMINOPHEN 10; 325 MG/1; MG/1
1 TABLET ORAL 4 TIMES DAILY
Qty: 56 TAB | Refills: 0 | Status: SHIPPED | OUTPATIENT
Start: 2020-07-06 | End: 2020-07-23 | Stop reason: SDUPTHER

## 2020-07-06 NOTE — PROGRESS NOTES
Patient has mesothelioma and is now having increasing pain both from the mesothelioma and his bad back.  He has been on tramadol 100 mg 4 times a day and is not getting adequate pain control.  We will try putting him back on Percocet 10/325 1 every 6 hours and see if he can tolerate that.  Also discussed that it may be time to move to hospice so he can get adequate pain control.

## 2020-07-23 ENCOUNTER — OFFICE VISIT (OUTPATIENT)
Dept: MEDICAL GROUP | Facility: PHYSICIAN GROUP | Age: 65
End: 2020-07-23
Payer: COMMERCIAL

## 2020-07-23 VITALS
BODY MASS INDEX: 27.25 KG/M2 | WEIGHT: 184 LBS | HEIGHT: 69 IN | OXYGEN SATURATION: 94 % | TEMPERATURE: 98.2 F | SYSTOLIC BLOOD PRESSURE: 110 MMHG | RESPIRATION RATE: 14 BRPM | DIASTOLIC BLOOD PRESSURE: 66 MMHG | HEART RATE: 59 BPM

## 2020-07-23 DIAGNOSIS — C45.0 MESOTHELIOMA (PLEURAL) (HCC): ICD-10-CM

## 2020-07-23 DIAGNOSIS — F51.01 PRIMARY INSOMNIA: ICD-10-CM

## 2020-07-23 DIAGNOSIS — I10 ESSENTIAL HYPERTENSION: ICD-10-CM

## 2020-07-23 DIAGNOSIS — M54.50 ACUTE MIDLINE LOW BACK PAIN WITHOUT SCIATICA: ICD-10-CM

## 2020-07-23 DIAGNOSIS — C45.0 MALIGNANT MESOTHELIOMA OF PLEURA (HCC): ICD-10-CM

## 2020-07-23 PROCEDURE — 99214 OFFICE O/P EST MOD 30 MIN: CPT | Performed by: FAMILY MEDICINE

## 2020-07-23 RX ORDER — LISINOPRIL 5 MG/1
5 TABLET ORAL DAILY
Qty: 90 TAB | Refills: 3 | Status: SHIPPED | OUTPATIENT
Start: 2020-07-23 | End: 2020-10-21

## 2020-07-23 RX ORDER — FLUOXETINE HYDROCHLORIDE 20 MG/1
20 CAPSULE ORAL DAILY
Qty: 90 CAP | Refills: 3 | Status: SHIPPED | OUTPATIENT
Start: 2020-07-23 | End: 2020-10-21

## 2020-07-23 RX ORDER — OXYCODONE AND ACETAMINOPHEN 10; 325 MG/1; MG/1
1 TABLET ORAL 4 TIMES DAILY
Qty: 56 TAB | Refills: 0 | Status: SHIPPED | OUTPATIENT
Start: 2020-07-23 | End: 2020-08-06

## 2020-07-23 RX ORDER — ZOLPIDEM TARTRATE 10 MG/1
10 TABLET ORAL
Qty: 90 EACH | Refills: 0 | Status: SHIPPED | OUTPATIENT
Start: 2020-08-04 | End: 2020-10-22 | Stop reason: SDUPTHER

## 2020-07-23 ASSESSMENT — FIBROSIS 4 INDEX: FIB4 SCORE: 1.18

## 2020-07-23 NOTE — ASSESSMENT & PLAN NOTE
This is a chronic health problem for this patient who utilizes Ambien for sleep.  He has had to do this for a number of years even prior to his cancer diagnosis.  He knows not to take a narcotic and and Ambien together.  It is appropriate to refill him at this time.  Obtained and reviewed patient utilization report from [facility] pharmacy database on 7/23/2020 1:27 PM  prior to writing prescription for controlled substance II, III or IV per [place] bill . Based on assessment of the report,my physical exam if necessary and the patient's health problem, the prescription is medically necessary.

## 2020-07-23 NOTE — ASSESSMENT & PLAN NOTE
This is a chronic health problem that is well controlled with current medications and lifestyle measures. His BP is excellent at 110/66.

## 2020-07-23 NOTE — PROGRESS NOTES
CC:Diagnoses of Mesothelioma (pleural) (HCC), Acute midline low back pain without sciatica, Malignant mesothelioma of pleura (HCC), Primary insomnia, and Essential hypertension were pertinent to this visit.    HISTORY OF PRESENT ILLNESS: Patient is a 65 y.o. male established patient who presents today to talk about his chronic health problems as outlined below.  This gentleman has a malignant mesothelioma that he is not ready for hospice but is not able to get adequate pain management with the level of narcotic that I can write.  I am going to send him to Dr. Casas at [facility].      Malignant mesothelioma of pleura (HCC)  This is a chronic problem for this patient that he did not tolerate chemotherapy and has decided to just let nature take its course.  We are trying to get adequate pain management.  He is currently on oxycodone 10/325 1 tablet 4 times a day.  He is finding that that is not holding his pain.  He also can tell when the medication wears off.  I am putting in a referral for him to see Dr. Casas at [facility] pain specialist.  I am hoping that they will be able to get his pain under better control may be utilizing a fentanyl patch which would be beyond what I can prescribe.    Until we can get him in to be seen I am going to continue his current prescription for oxycodone 10/325 one tab 4 times a day.  I have written a new prescription for 14 days.    Primary insomnia  This is a chronic health problem for this patient who utilizes Ambien for sleep.  He has had to do this for a number of years even prior to his cancer diagnosis.  He knows not to take a narcotic and and Ambien together.  It is appropriate to refill him at this time.  Obtained and reviewed patient utilization report from [facility] pharmacy database on 7/23/2020 1:27 PM  prior to writing prescription for controlled substance II, III or IV per [place] bill . Based on assessment of the report,my physical exam if necessary and  the patient's health problem, the prescription is medically necessary.         Essential hypertension  This is a chronic health problem that is well controlled with current medications and lifestyle measures. His BP is excellent at 110/66.      Patient Active Problem List    Diagnosis Date Noted   • Abdominal distention, non-gaseous 02/06/2020   • Combined forms of age-related cataract of both eyes 02/04/2020   • Malignant mesothelioma of pleura (HCC) 03/11/2019   • S/P ablation of atrial flutter 01/10/2019   • Statin intolerance 01/10/2019   • Typical atrial flutter (HCC) 01/09/2019   • Controlled substance agreement signed 01/03/2019   • Primary insomnia 01/03/2019   • Health care maintenance 01/03/2019   • Moderate episode of recurrent major depressive disorder ([facility]) 10/18/2011   • Tobacco abuse disorder 10/18/2011   • Vitamin D deficiency 06/04/2010   • Displacement of lumbar intervertebral disc without myelopathy    • Essential hypertension    • Mixed hyperlipidemia    • Esophageal reflux    • Disturbance in sleep behavior    • Palpitations    • Disc disease with myelopathy, lumbar       Allergies:Atorvastatin    Current Outpatient Medications   Medication Sig Dispense Refill   • oxyCODONE-acetaminophen (PERCOCET-10)  MG Tab Take 1 Tab by mouth 4 times a day for 14 days. 56 Tab 0   • [START ON 8/4/2020] zolpidem (AMBIEN) 10 MG Tab Take 1 Tab by mouth every bedtime for 90 days. 90 Each 0   • FLUoxetine (PROZAC) 20 MG Cap Take 1 Cap by mouth every day for 90 days. 90 Cap 3   • lisinopril (PRINIVIL) 5 MG Tab Take 1 Tab by mouth every day for 90 days. 90 Tab 3   • levothyroxine (SYNTHROID) 88 MCG Tab Take 1 Tab by mouth Every morning on an empty stomach. 90 Tab 3   • metoprolol (LOPRESSOR) 25 MG Tab Take 1 Tab by mouth 2 times a day. 180 Tab 3   • tamsulosin (FLOMAX) 0.4 MG capsule Take 2 Caps by mouth every bedtime. 180 Cap 3   • magnesium oxide (MAG-OX) 400 MG Tab Take 400 mg by mouth every day.     •  Calcium-Magnesium-Vitamin D (CALCIUM 1200+D3 PO) Take 1 Cap by mouth 2 Times a Day.     • Zinc 50 MG Cap Take 50 mg by mouth 2 Times a Day.       No current facility-administered medications for this visit.        Social History     Tobacco Use   • Smoking status: Never Smoker   • Smokeless tobacco: Current User     Types: Chew   • Tobacco comment: 1 can every 2 days for 30 years (2009)   Substance Use Topics   • Alcohol use: No   • Drug use: No     Social History     Social History Narrative   • Not on file       Family History   Problem Relation Age of Onset   • Hypertension Mother    • Arthritis Mother    • Heart Disease Mother         atrial fibrillation   • Diabetes Father    • Cancer Father         PROSTATE CA   • Heart Disease Father         CAD   • Hypertension Father         ROS:     - Constitutional: Positive for fatigue and malaise on a daily basis.    - HEENT:  Negative for headaches, vision changes, hearing changes, ear pain, ear discharge, rhinorrhea, sinus congestion, sore throat, and neck pain.      - Respiratory: Positive for mild shortness of breath with any activity.       - Cardiovascular: Negative for chest pain, palpitations, orthopnea, and bilateral lower extremity edema.     - Gastrointestinal: Negative for heartburn, nausea, vomiting, abdominal pain, hematochezia, melena, diarrhea, constipation, and greasy/foul-smelling stools.     - Genitourinary: Negative for dysuria, polyuria, hematuria, pyuria, urinary urgency, and urinary incontinence.     - Musculoskeletal: Patient admits that he is now becoming generalized painful in his joints particularly low back but he is also now developing pain in the left chest where his primary tumor is.      - Skin: Negative for rash, itching, cyanotic skin color change.     - Neurological: Negative for dizziness, tingling, tremors, focal sensory deficit, focal weakness and headaches.     - Endo/Heme/Allergies: Does not bruise/bleed easily.     -  "Psychiatric/Behavioral: Negative for depression, suicidal/homicidal ideation and memory loss.          - NOTE: All other systems reviewed and are negative, except as in HPI.      Exam:    /66 (BP Location: Left arm, Patient Position: Sitting, BP Cuff Size: Adult)   Pulse (!) 59   Temp 36.8 °C (98.2 °F) (Temporal)   Resp 14   Ht 1.753 m (5' 9\")   Wt 83.5 kg (184 lb)   SpO2 94%  Body mass index is 27.17 kg/m².    General:  Well nourished, well developed male in NAD  Head is grossly normal.  Neck: Supple without JVD or bruit. Thyroid is not enlarged.  Pulmonary: Clear to ausculation and percussion.  Normal effort. No rales, ronchi, or wheezing.  Cardiovascular: Regular rate and rhythm without murmur. Carotid and radial pulses are intact and equal bilaterally.  Extremities: no clubbing, cyanosis, or edema.  Patient was seen for 30 minutes face to face of which, 20 minutes was spent counseling regarding change in pain medication as well as appropriate pain meds and treatments as his cancer progresses..    Please note that this dictation was created using voice recognition software. I have made every reasonable attempt to correct obvious errors, but I expect that there are errors of grammar and possibly content that I did not discover before finalizing the note.    Assessment/Plan:  1. Mesothelioma (pleural) (HCC)  Uncontrolled pain.  We will continue with the Percocet 10/325 4 times daily until I can get him into a pain specialist.  I wrote him prescription for 14 days.  - oxyCODONE-acetaminophen (PERCOCET-10)  MG Tab; Take 1 Tab by mouth 4 times a day for 14 days.  Dispense: 56 Tab; Refill: 0  - REFERRAL TO PAIN MANAGEMENT    2. Acute midline low back pain without sciatica  Uncontrolled, renewed his oxycodone  - oxyCODONE-acetaminophen (PERCOCET-10)  MG Tab; Take 1 Tab by mouth 4 times a day for 14 days.  Dispense: 56 Tab; Refill: 0  - REFERRAL TO PAIN MANAGEMENT    3. Malignant mesothelioma of " pleura (HCC)  Uncontrolled need to get adequate pain control in place.    4. Primary insomnia  Controlled, continue with current meds and lifestyle.    - zolpidem (AMBIEN) 10 MG Tab; Take 1 Tab by mouth every bedtime for 90 days.  Dispense: 90 Each; Refill: 0    5. Essential hypertension  Controlled, continue with current meds and lifestyle.

## 2020-07-23 NOTE — ASSESSMENT & PLAN NOTE
This is a chronic problem for this patient that he did not tolerate chemotherapy and has decided to just let nature take its course.  We are trying to get adequate pain management.  He is currently on oxycodone 10/325 1 tablet 4 times a day.  He is finding that that is not holding his pain.  He also can tell when the medication wears off.  I am putting in a referral for him to see Dr. Casas at [facility] pain specialist.  I am hoping that they will be able to get his pain under better control may be utilizing a fentanyl patch which would be beyond what I can prescribe.    Until we can get him in to be seen I am going to continue his current prescription for oxycodone 10/325 one tab 4 times a day.  I have written a new prescription for 14 days.

## 2020-07-27 ENCOUNTER — PATIENT MESSAGE (OUTPATIENT)
Dept: MEDICAL GROUP | Facility: PHYSICIAN GROUP | Age: 65
End: 2020-07-27

## 2020-07-27 DIAGNOSIS — C45.0 MALIGNANT MESOTHELIOMA OF PLEURA (HCC): ICD-10-CM

## 2020-07-27 DIAGNOSIS — M51.26 DISPLACEMENT OF LUMBAR INTERVERTEBRAL DISC WITHOUT MYELOPATHY: ICD-10-CM

## 2020-10-22 ENCOUNTER — OFFICE VISIT (OUTPATIENT)
Dept: MEDICAL GROUP | Facility: PHYSICIAN GROUP | Age: 65
End: 2020-10-22
Payer: COMMERCIAL

## 2020-10-22 VITALS
BODY MASS INDEX: 27.05 KG/M2 | HEART RATE: 55 BPM | HEIGHT: 69 IN | SYSTOLIC BLOOD PRESSURE: 122 MMHG | DIASTOLIC BLOOD PRESSURE: 62 MMHG | RESPIRATION RATE: 14 BRPM | OXYGEN SATURATION: 94 % | TEMPERATURE: 98.3 F | WEIGHT: 182.6 LBS

## 2020-10-22 DIAGNOSIS — Z72.0 TOBACCO ABUSE DISORDER: ICD-10-CM

## 2020-10-22 DIAGNOSIS — F51.01 PRIMARY INSOMNIA: ICD-10-CM

## 2020-10-22 DIAGNOSIS — R09.02 HYPOXIA: ICD-10-CM

## 2020-10-22 DIAGNOSIS — Z23 NEED FOR VACCINATION: ICD-10-CM

## 2020-10-22 DIAGNOSIS — F33.1 MODERATE EPISODE OF RECURRENT MAJOR DEPRESSIVE DISORDER (HCC): ICD-10-CM

## 2020-10-22 DIAGNOSIS — C45.0 MALIGNANT MESOTHELIOMA OF PLEURA (HCC): ICD-10-CM

## 2020-10-22 PROCEDURE — 90471 IMMUNIZATION ADMIN: CPT | Performed by: FAMILY MEDICINE

## 2020-10-22 PROCEDURE — 99214 OFFICE O/P EST MOD 30 MIN: CPT | Mod: 25 | Performed by: FAMILY MEDICINE

## 2020-10-22 PROCEDURE — 90686 IIV4 VACC NO PRSV 0.5 ML IM: CPT | Performed by: FAMILY MEDICINE

## 2020-10-22 RX ORDER — FLUOXETINE HYDROCHLORIDE 40 MG/1
40 CAPSULE ORAL DAILY
Qty: 90 CAP | Refills: 3 | Status: SHIPPED | OUTPATIENT
Start: 2020-10-22 | End: 2022-01-07

## 2020-10-22 RX ORDER — OXYCODONE AND ACETAMINOPHEN 10; 325 MG/1; MG/1
1 TABLET ORAL 4 TIMES DAILY
COMMUNITY
Start: 2020-09-11 | End: 2022-01-07

## 2020-10-22 RX ORDER — ZOLPIDEM TARTRATE 10 MG/1
10 TABLET ORAL
Qty: 90 EACH | Refills: 0 | Status: SHIPPED | OUTPATIENT
Start: 2020-11-02 | End: 2022-01-01

## 2020-10-22 RX ORDER — MORPHINE SULFATE 15 MG/1
1 TABLET, FILM COATED, EXTENDED RELEASE ORAL 2 TIMES DAILY
COMMUNITY
Start: 2020-09-11 | End: 2021-10-25

## 2020-10-22 ASSESSMENT — FIBROSIS 4 INDEX: FIB4 SCORE: 1.18

## 2020-10-22 NOTE — ASSESSMENT & PLAN NOTE
This is a chronic health problem for this patient where he is starting to notice that he is getting more short of breath.  With activity he finds himself feeling oxygen hunger.  He actually purchased boost oxygen.  He dropped his O2sat during the 6 minute walk.  He dropped from 94% to 85% on Room Air.

## 2020-10-22 NOTE — ASSESSMENT & PLAN NOTE
This patient has complained of feeling shortness of breath with activity secondary to his mesothelioma.  We had him do a 6-minute walk here in the office and he was only able to complete 4 minutes because of extreme shortness of breath and his O2 sat dropped to 85%.

## 2020-10-22 NOTE — PROGRESS NOTES
"CC:Diagnoses of Moderate episode of recurrent major depressive disorder (HCC), Primary insomnia, Malignant mesothelioma of pleura (HCC), Tobacco abuse disorder, Need for vaccination, and Hypoxia were pertinent to this visit.    HISTORY OF PRESENT ILLNESS: Patient is a 65 y.o. male established patient who presents today to follow-up on his chronic health problems.  This patient's mesothelioma is starting to progress.  He is having more shortness of breath and finds he becomes hypoxic.  They have been checking his O2 sat at home and had some times where it has gone low.  We will have him do a 6-minute walk here in the office.      Moderate episode of recurrent major depressive disorder (HCC)  This is a chronic health problem for this patient that we had him on fluoxetine 20 mg daily.  He finds that he \"just feels blah\".  He is not really having emotional lability but he finds that nothing is interesting, exciting or pleasurable.  I would like to try increasing his fluoxetine to 40 mg daily and see if that makes a difference for him.  It is difficult because he has to be on chronic narcotics because of his mesothelioma, those can contribute to worsening depression.    Primary insomnia  This is a chronic health problem for this patient that he continues to require zolpidem 10 mg at night to try and get adequate sleep.  There are times where he has to use pain medications in the evening but does not take zolpidem until he is actually in bed.  He is well aware of the black box warning and is being careful as is his wife who manages his meds  Obtained and reviewed patient utilization report from [facility] pharmacy database on 10/22/2020 10:39 AM  prior to writing prescription for controlled substance II, III or IV per [place] bill . Based on assessment of the report,my physical exam if necessary and the patient's health problem, the prescription is medically necessary.       Malignant mesothelioma of pleura " ([facility])  This is a chronic health problem for this patient where he is starting to notice that he is getting more short of breath.  With activity he finds himself feeling oxygen hunger.  He actually purchased boost oxygen.  He dropped his O2sat during the 6 minute walk.  He dropped from 94% to 85% on Room Air.      Tobacco abuse disorder  Patient continues to chew tobacco on a daily basis.    Hypoxia  This patient has complained of feeling shortness of breath with activity secondary to his mesothelioma.  We had him do a 6-minute walk here in the office and he was only able to complete 4 minutes because of extreme shortness of breath and his O2 sat dropped to 85%.      Patient Active Problem List    Diagnosis Date Noted   • Hypoxia 10/22/2020   • Abdominal distention, non-gaseous 02/06/2020   • Combined forms of age-related cataract of both eyes 02/04/2020   • Malignant mesothelioma of pleura ([facility]) 03/11/2019   • S/P ablation of atrial flutter 01/10/2019   • Statin intolerance 01/10/2019   • Typical atrial flutter ([facility]) 01/09/2019   • Controlled substance agreement signed 01/03/2019   • Primary insomnia 01/03/2019   • Health care maintenance 01/03/2019   • Moderate episode of recurrent major depressive disorder ([facility]) 10/18/2011   • Tobacco abuse disorder 10/18/2011   • Vitamin D deficiency 06/04/2010   • Displacement of lumbar intervertebral disc without myelopathy    • Essential hypertension    • Mixed hyperlipidemia    • Esophageal reflux    • Disturbance in sleep behavior    • Palpitations    • Disc disease with myelopathy, lumbar       Allergies:Atorvastatin    Current Outpatient Medications   Medication Sig Dispense Refill   • fluoxetine (PROZAC) 40 MG capsule Take 1 Cap by mouth every day. 90 Cap 3   • [START ON 11/2/2020] zolpidem (AMBIEN) 10 MG Tab Take 1 Tab by mouth every bedtime for 90 days. 90 Each 0   • morphine ER (MS CONTIN) 15 MG Tab CR tablet Take 1 Tab by mouth 2 Times a Day.     •  oxyCODONE-acetaminophen (PERCOCET-10)  MG Tab Take 1 Tab by mouth 4 times a day.     • levothyroxine (SYNTHROID) 88 MCG Tab Take 1 Tab by mouth Every morning on an empty stomach. 90 Tab 3   • metoprolol (LOPRESSOR) 25 MG Tab Take 1 Tab by mouth 2 times a day. 180 Tab 3   • tamsulosin (FLOMAX) 0.4 MG capsule Take 2 Caps by mouth every bedtime. 180 Cap 3   • magnesium oxide (MAG-OX) 400 MG Tab Take 400 mg by mouth every day.     • Calcium-Magnesium-Vitamin D (CALCIUM 1200+D3 PO) Take 1 Cap by mouth 2 Times a Day.     • Zinc 50 MG Cap Take 50 mg by mouth 2 Times a Day.       No current facility-administered medications for this visit.        Social History     Tobacco Use   • Smoking status: Never Smoker   • Smokeless tobacco: Current User     Types: Chew   • Tobacco comment: 1 can every 2 days for 30 years (2009)   Substance Use Topics   • Alcohol use: No   • Drug use: No     Social History     Social History Narrative   • Not on file       Family History   Problem Relation Age of Onset   • Hypertension Mother    • Arthritis Mother    • Heart Disease Mother         atrial fibrillation   • Diabetes Father    • Cancer Father         PROSTATE CA   • Heart Disease Father         CAD   • Hypertension Father         ROS:     - Constitutional: Positive for fatigue and malaise secondary to mesothelioma.     - HEENT:  Negative for headaches, vision changes, hearing changes, ear pain, ear discharge, rhinorrhea, sinus congestion, sore throat, and neck pain.      - Respiratory: Increasing shortness of breath with activity denies cough      - Cardiovascular: Negative for chest pain, palpitations, orthopnea, and bilateral lower extremity edema.     - Gastrointestinal: Negative for heartburn, nausea, vomiting, abdominal pain, hematochezia, melena, diarrhea, constipation, and greasy/foul-smelling stools.     - Genitourinary: Negative for dysuria, polyuria, hematuria, pyuria, urinary urgency, and urinary incontinence.     -  "Musculoskeletal: Negative for myalgias, back pain, and joint pain.     - Skin: Negative for rash, itching, cyanotic skin color change.     - Neurological: Negative for dizziness, tingling, tremors, focal sensory deficit, focal weakness and headaches.     - Endo/Heme/Allergies: Does not bruise/bleed easily.             - NOTE: All other systems reviewed and are negative, except as in HPI.      Exam:    /62 (BP Location: Right arm, Patient Position: Sitting, BP Cuff Size: Adult)   Pulse (!) 55   Temp 36.8 °C (98.3 °F) (Temporal)   Resp 14   Ht 1.753 m (5' 9\")   Wt 82.8 kg (182 lb 9.6 oz)   SpO2 94%  Body mass index is 26.97 kg/m².    General:  Well nourished, well developed male in NAD  Head is grossly normal.  Neck: Supple without JVD or bruit. Thyroid is not enlarged.  Pulmonary: Clear to ausculation and percussion.  Normal effort. No rales, ronchi, or wheezing.  Cardiovascular: Regular rate and rhythm without murmur. Carotid and radial pulses are intact and equal bilaterally.  Extremities: no clubbing, cyanosis, or edema.  Patient was seen for 25 minutes face to face of which, 20 minutes was spent counseling regarding discussion of his medications, the progression of his mesothelioma and need for oxygen long-term.    Please note that this dictation was created using voice recognition software. I have made every reasonable attempt to correct obvious errors, but I expect that there are errors of grammar and possibly content that I did not discover before finalizing the note.    Assessment/Plan:  1. Moderate episode of recurrent major depressive disorder (HCC)  Uncontrolled, were going to try increasing the patient's fluoxetine to 40 mg daily and have him see if that works better for him.  They will let me know via [employer]    2. Primary insomnia  Adequately controlled with current meds which was refilled for him today.  - zolpidem (AMBIEN) 10 MG Tab; Take 1 Tab by mouth every bedtime for 90 days.  " Dispense: 90 Each; Refill: 0    3. Malignant mesothelioma of pleura (HCC)  Uncontrolled unfortunately the patient's mesothelioma is progressing and he is developing shortness of breath and dyspnea on exertion.  His O2 sat dropped to 85%.  We will get him started with oxygen at 2 L per nasal cannula continuous    4. Tobacco abuse disorder  Patient continues to chew    5. Need for vaccination  Given today  - Influenza Vaccine Quad Injection (PF)    6. Hypoxia  Uncontrolled, patient dropped his O2 sat to 85% during a 6-minute walk here in the office.  We are going to have him start with oxygen per nasal cannula at 2 L.  I suspect he will be on this until he passes away.

## 2020-10-22 NOTE — ASSESSMENT & PLAN NOTE
This is a chronic health problem for this patient that he continues to require zolpidem 10 mg at night to try and get adequate sleep.  There are times where he has to use pain medications in the evening but does not take zolpidem until he is actually in bed.  He is well aware of the black box warning and is being careful as is his wife who manages his meds  Obtained and reviewed patient utilization report from [facility] pharmacy database on 10/22/2020 10:39 AM  prior to writing prescription for controlled substance II, III or IV per [place] bill . Based on assessment of the report,my physical exam if necessary and the patient's health problem, the prescription is medically necessary.

## 2020-10-22 NOTE — ASSESSMENT & PLAN NOTE
"This is a chronic health problem for this patient that we had him on fluoxetine 20 mg daily.  He finds that he \"just feels blah\".  He is not really having emotional lability but he finds that nothing is interesting, exciting or pleasurable.  I would like to try increasing his fluoxetine to 40 mg daily and see if that makes a difference for him.  It is difficult because he has to be on chronic narcotics because of his mesothelioma, those can contribute to worsening depression.  "

## 2021-07-08 DIAGNOSIS — F51.01 PRIMARY INSOMNIA: ICD-10-CM

## 2021-07-08 PROBLEM — M19.90 OSTEOARTHROSIS: Status: ACTIVE | Noted: 2018-02-23

## 2021-07-08 PROBLEM — G89.3 CANCER ASSOCIATED PAIN: Status: ACTIVE | Noted: 2020-11-19

## 2021-07-08 PROBLEM — F11.90 CHRONIC, CONTINUOUS USE OF OPIOIDS: Status: ACTIVE | Noted: 2021-01-18

## 2021-07-08 PROBLEM — M54.50 CHRONIC LOW BACK PAIN: Status: ACTIVE | Noted: 2021-01-18

## 2021-07-08 PROBLEM — M96.1 LUMBAR POST-LAMINECTOMY SYNDROME: Status: ACTIVE | Noted: 2021-01-18

## 2021-07-08 PROBLEM — G89.29 CHRONIC LOW BACK PAIN: Status: ACTIVE | Noted: 2021-01-18

## 2021-07-08 RX ORDER — UBIDECARENONE 30 MG
CAPSULE ORAL
COMMUNITY
End: 2021-10-25

## 2021-07-08 RX ORDER — FENTANYL 12.5 UG/1
1 PATCH TRANSDERMAL
COMMUNITY
Start: 2021-06-11 | End: 2021-07-11

## 2021-07-08 RX ORDER — ZOLPIDEM TARTRATE 10 MG/1
10 TABLET ORAL NIGHTLY PRN
Qty: 90 TABLET | Refills: 0 | Status: CANCELLED | OUTPATIENT
Start: 2021-07-08 | End: 2021-10-06

## 2021-07-08 RX ORDER — NALOXONE HYDROCHLORIDE 4 MG/.1ML
SPRAY NASAL
COMMUNITY
Start: 2021-01-18 | End: 2022-01-01

## 2021-07-08 RX ORDER — OXYCODONE HYDROCHLORIDE 10 MG/1
10 TABLET ORAL
COMMUNITY
Start: 2021-06-11 | End: 2021-07-11

## 2021-07-08 RX ORDER — ZINC GLUCONATE 50 MG
TABLET ORAL
COMMUNITY
End: 2021-10-25

## 2021-07-13 ENCOUNTER — HOSPITAL ENCOUNTER (OUTPATIENT)
Dept: LAB | Facility: MEDICAL CENTER | Age: 66
End: 2021-07-13
Attending: PHYSICIAN ASSISTANT
Payer: MEDICARE

## 2021-07-13 DIAGNOSIS — F51.01 PRIMARY INSOMNIA: ICD-10-CM

## 2021-07-13 LAB
ANION GAP SERPL CALC-SCNC: 11 MMOL/L (ref 7–16)
BUN SERPL-MCNC: 13 MG/DL (ref 8–22)
CALCIUM SERPL-MCNC: 9.7 MG/DL (ref 8.5–10.5)
CHLORIDE SERPL-SCNC: 97 MMOL/L (ref 96–112)
CO2 SERPL-SCNC: 27 MMOL/L (ref 20–33)
CREAT SERPL-MCNC: 0.88 MG/DL (ref 0.5–1.4)
GLUCOSE SERPL-MCNC: 87 MG/DL (ref 65–99)
POTASSIUM SERPL-SCNC: 4.2 MMOL/L (ref 3.6–5.5)
SODIUM SERPL-SCNC: 135 MMOL/L (ref 135–145)

## 2021-07-13 PROCEDURE — 80048 BASIC METABOLIC PNL TOTAL CA: CPT

## 2021-07-13 PROCEDURE — 36415 COLL VENOUS BLD VENIPUNCTURE: CPT

## 2021-07-13 RX ORDER — ZOLPIDEM TARTRATE 10 MG/1
10 TABLET ORAL
Qty: 90 EACH | Refills: 0 | Status: SHIPPED | OUTPATIENT
Start: 2021-07-13 | End: 2022-04-08 | Stop reason: SDUPTHER

## 2021-08-06 ENCOUNTER — TELEPHONE (OUTPATIENT)
Dept: INTERNAL MEDICINE | Facility: IMAGING CENTER | Age: 66
End: 2021-08-06

## 2021-10-14 ENCOUNTER — HOSPITAL ENCOUNTER (OUTPATIENT)
Dept: LAB | Facility: MEDICAL CENTER | Age: 66
End: 2021-10-14
Attending: PAIN MEDICINE
Payer: MEDICARE

## 2021-10-14 LAB
ALBUMIN SERPL BCP-MCNC: 3.3 G/DL (ref 3.2–4.9)
ALBUMIN/GLOB SERPL: 0.8 G/DL
ALP SERPL-CCNC: 80 U/L (ref 30–99)
ALT SERPL-CCNC: <5 U/L (ref 2–50)
ANION GAP SERPL CALC-SCNC: 10 MMOL/L (ref 7–16)
AST SERPL-CCNC: 14 U/L (ref 12–45)
BASOPHILS # BLD AUTO: 0.9 % (ref 0–1.8)
BASOPHILS # BLD: 0.07 K/UL (ref 0–0.12)
BILIRUB SERPL-MCNC: 0.3 MG/DL (ref 0.1–1.5)
BUN SERPL-MCNC: 16 MG/DL (ref 8–22)
CALCIUM SERPL-MCNC: 9.9 MG/DL (ref 8.5–10.5)
CHLORIDE SERPL-SCNC: 102 MMOL/L (ref 96–112)
CO2 SERPL-SCNC: 28 MMOL/L (ref 20–33)
CREAT SERPL-MCNC: 1.1 MG/DL (ref 0.5–1.4)
EOSINOPHIL # BLD AUTO: 0.14 K/UL (ref 0–0.51)
EOSINOPHIL NFR BLD: 1.8 % (ref 0–6.9)
ERYTHROCYTE [DISTWIDTH] IN BLOOD BY AUTOMATED COUNT: 46.6 FL (ref 35.9–50)
GLOBULIN SER CALC-MCNC: 3.9 G/DL (ref 1.9–3.5)
GLUCOSE SERPL-MCNC: 125 MG/DL (ref 65–99)
HCT VFR BLD AUTO: 37.6 % (ref 42–52)
HGB BLD-MCNC: 11.3 G/DL (ref 14–18)
IMM GRANULOCYTES # BLD AUTO: 0.02 K/UL (ref 0–0.11)
IMM GRANULOCYTES NFR BLD AUTO: 0.3 % (ref 0–0.9)
LYMPHOCYTES # BLD AUTO: 1.1 K/UL (ref 1–4.8)
LYMPHOCYTES NFR BLD: 14.4 % (ref 22–41)
MCH RBC QN AUTO: 24.9 PG (ref 27–33)
MCHC RBC AUTO-ENTMCNC: 30.1 G/DL (ref 33.7–35.3)
MCV RBC AUTO: 83 FL (ref 81.4–97.8)
MONOCYTES # BLD AUTO: 0.84 K/UL (ref 0–0.85)
MONOCYTES NFR BLD AUTO: 11 % (ref 0–13.4)
NEUTROPHILS # BLD AUTO: 5.48 K/UL (ref 1.82–7.42)
NEUTROPHILS NFR BLD: 71.6 % (ref 44–72)
NRBC # BLD AUTO: 0 K/UL
NRBC BLD-RTO: 0 /100 WBC
PLATELET # BLD AUTO: 435 K/UL (ref 164–446)
PMV BLD AUTO: 9.1 FL (ref 9–12.9)
POTASSIUM SERPL-SCNC: 4.2 MMOL/L (ref 3.6–5.5)
PROT SERPL-MCNC: 7.2 G/DL (ref 6–8.2)
RBC # BLD AUTO: 4.53 M/UL (ref 4.7–6.1)
SODIUM SERPL-SCNC: 140 MMOL/L (ref 135–145)
WBC # BLD AUTO: 7.7 K/UL (ref 4.8–10.8)

## 2021-10-14 PROCEDURE — 85025 COMPLETE CBC W/AUTO DIFF WBC: CPT

## 2021-10-14 PROCEDURE — 80053 COMPREHEN METABOLIC PANEL: CPT

## 2021-10-14 PROCEDURE — 36415 COLL VENOUS BLD VENIPUNCTURE: CPT

## 2021-10-25 DIAGNOSIS — E78.2 MIXED HYPERLIPIDEMIA: ICD-10-CM

## 2021-10-25 DIAGNOSIS — E03.9 HYPOTHYROIDISM (ACQUIRED): ICD-10-CM

## 2021-11-24 ENCOUNTER — HOSPITAL ENCOUNTER (OUTPATIENT)
Dept: LAB | Facility: MEDICAL CENTER | Age: 66
End: 2021-11-24
Attending: UROLOGY
Payer: MEDICARE

## 2021-11-24 ENCOUNTER — HOSPITAL ENCOUNTER (OUTPATIENT)
Dept: CARDIOLOGY | Facility: MEDICAL CENTER | Age: 66
End: 2021-11-24
Attending: UROLOGY
Payer: MEDICARE

## 2021-11-24 DIAGNOSIS — Z01.810 PRE-OPERATIVE CARDIOVASCULAR EXAMINATION: ICD-10-CM

## 2021-11-24 LAB
ANION GAP SERPL CALC-SCNC: 13 MMOL/L (ref 7–16)
APPEARANCE UR: CLEAR
APTT PPP: 53.7 SEC (ref 24.7–36)
BACTERIA #/AREA URNS HPF: NEGATIVE /HPF
BASOPHILS # BLD AUTO: 0.8 % (ref 0–1.8)
BASOPHILS # BLD: 0.07 K/UL (ref 0–0.12)
BILIRUB UR QL STRIP.AUTO: NEGATIVE
BUN SERPL-MCNC: 18 MG/DL (ref 8–22)
CALCIUM SERPL-MCNC: 10.7 MG/DL (ref 8.5–10.5)
CHLORIDE SERPL-SCNC: 109 MMOL/L (ref 96–112)
CO2 SERPL-SCNC: 25 MMOL/L (ref 20–33)
COLOR UR: YELLOW
CREAT SERPL-MCNC: 1.07 MG/DL (ref 0.5–1.4)
EKG IMPRESSION: NORMAL
EOSINOPHIL # BLD AUTO: 0.13 K/UL (ref 0–0.51)
EOSINOPHIL NFR BLD: 1.4 % (ref 0–6.9)
EPI CELLS #/AREA URNS HPF: NEGATIVE /HPF
ERYTHROCYTE [DISTWIDTH] IN BLOOD BY AUTOMATED COUNT: 47.9 FL (ref 35.9–50)
GLUCOSE SERPL-MCNC: 99 MG/DL (ref 65–99)
GLUCOSE UR STRIP.AUTO-MCNC: NEGATIVE MG/DL
HCT VFR BLD AUTO: 38 % (ref 42–52)
HGB BLD-MCNC: 11.7 G/DL (ref 14–18)
HYALINE CASTS #/AREA URNS LPF: ABNORMAL /LPF
IMM GRANULOCYTES # BLD AUTO: 0.03 K/UL (ref 0–0.11)
IMM GRANULOCYTES NFR BLD AUTO: 0.3 % (ref 0–0.9)
INR PPP: 1.22 (ref 0.87–1.13)
KETONES UR STRIP.AUTO-MCNC: NEGATIVE MG/DL
LEUKOCYTE ESTERASE UR QL STRIP.AUTO: ABNORMAL
LYMPHOCYTES # BLD AUTO: 1.02 K/UL (ref 1–4.8)
LYMPHOCYTES NFR BLD: 11.3 % (ref 22–41)
MCH RBC QN AUTO: 25.4 PG (ref 27–33)
MCHC RBC AUTO-ENTMCNC: 30.8 G/DL (ref 33.7–35.3)
MCV RBC AUTO: 82.6 FL (ref 81.4–97.8)
MICRO URNS: ABNORMAL
MONOCYTES # BLD AUTO: 0.89 K/UL (ref 0–0.85)
MONOCYTES NFR BLD AUTO: 9.8 % (ref 0–13.4)
NEUTROPHILS # BLD AUTO: 6.91 K/UL (ref 1.82–7.42)
NEUTROPHILS NFR BLD: 76.4 % (ref 44–72)
NITRITE UR QL STRIP.AUTO: NEGATIVE
NRBC # BLD AUTO: 0 K/UL
NRBC BLD-RTO: 0 /100 WBC
PH UR STRIP.AUTO: 7 [PH] (ref 5–8)
PLATELET # BLD AUTO: 425 K/UL (ref 164–446)
PMV BLD AUTO: 9.2 FL (ref 9–12.9)
POTASSIUM SERPL-SCNC: 4.3 MMOL/L (ref 3.6–5.5)
PROT UR QL STRIP: NEGATIVE MG/DL
PROTHROMBIN TIME: 15 SEC (ref 12–14.6)
RBC # BLD AUTO: 4.6 M/UL (ref 4.7–6.1)
RBC # URNS HPF: ABNORMAL /HPF
RBC UR QL AUTO: NEGATIVE
SODIUM SERPL-SCNC: 147 MMOL/L (ref 135–145)
SP GR UR STRIP.AUTO: 1.01
URATE CRY #/AREA URNS HPF: POSITIVE /HPF
UROBILINOGEN UR STRIP.AUTO-MCNC: 0.2 MG/DL
WBC # BLD AUTO: 9.1 K/UL (ref 4.8–10.8)
WBC #/AREA URNS HPF: ABNORMAL /HPF

## 2021-11-24 PROCEDURE — 81001 URINALYSIS AUTO W/SCOPE: CPT

## 2021-11-24 PROCEDURE — 85610 PROTHROMBIN TIME: CPT

## 2021-11-24 PROCEDURE — 36415 COLL VENOUS BLD VENIPUNCTURE: CPT

## 2021-11-24 PROCEDURE — 87086 URINE CULTURE/COLONY COUNT: CPT

## 2021-11-24 PROCEDURE — 85730 THROMBOPLASTIN TIME PARTIAL: CPT

## 2021-11-24 PROCEDURE — 80048 BASIC METABOLIC PNL TOTAL CA: CPT

## 2021-11-24 PROCEDURE — 85025 COMPLETE CBC W/AUTO DIFF WBC: CPT

## 2021-11-26 LAB
BACTERIA UR CULT: NORMAL
SIGNIFICANT IND 70042: NORMAL
SITE SITE: NORMAL
SOURCE SOURCE: NORMAL

## 2021-12-15 ENCOUNTER — HOSPITAL ENCOUNTER (OUTPATIENT)
Dept: LAB | Facility: MEDICAL CENTER | Age: 66
End: 2021-12-15
Attending: FAMILY MEDICINE
Payer: MEDICARE

## 2021-12-15 DIAGNOSIS — E03.9 HYPOTHYROIDISM (ACQUIRED): ICD-10-CM

## 2021-12-15 DIAGNOSIS — E78.2 MIXED HYPERLIPIDEMIA: ICD-10-CM

## 2021-12-15 LAB
ALBUMIN SERPL BCP-MCNC: 3.5 G/DL (ref 3.2–4.9)
ALBUMIN/GLOB SERPL: 0.8 G/DL
ALP SERPL-CCNC: 102 U/L (ref 30–99)
ALT SERPL-CCNC: 8 U/L (ref 2–50)
ANION GAP SERPL CALC-SCNC: 12 MMOL/L (ref 7–16)
AST SERPL-CCNC: 12 U/L (ref 12–45)
BILIRUB SERPL-MCNC: 0.4 MG/DL (ref 0.1–1.5)
BUN SERPL-MCNC: 21 MG/DL (ref 8–22)
CALCIUM SERPL-MCNC: 11 MG/DL (ref 8.5–10.5)
CHLORIDE SERPL-SCNC: 103 MMOL/L (ref 96–112)
CHOLEST SERPL-MCNC: 181 MG/DL (ref 100–199)
CO2 SERPL-SCNC: 26 MMOL/L (ref 20–33)
CREAT SERPL-MCNC: 1.05 MG/DL (ref 0.5–1.4)
ERYTHROCYTE [DISTWIDTH] IN BLOOD BY AUTOMATED COUNT: 47.9 FL (ref 35.9–50)
FASTING STATUS PATIENT QL REPORTED: NORMAL
GLOBULIN SER CALC-MCNC: 4.4 G/DL (ref 1.9–3.5)
GLUCOSE SERPL-MCNC: 96 MG/DL (ref 65–99)
HCT VFR BLD AUTO: 40.2 % (ref 42–52)
HDLC SERPL-MCNC: 36 MG/DL
HGB BLD-MCNC: 12.2 G/DL (ref 14–18)
LDLC SERPL CALC-MCNC: 128 MG/DL
MCH RBC QN AUTO: 25.1 PG (ref 27–33)
MCHC RBC AUTO-ENTMCNC: 30.3 G/DL (ref 33.7–35.3)
MCV RBC AUTO: 82.5 FL (ref 81.4–97.8)
PLATELET # BLD AUTO: 409 K/UL (ref 164–446)
PMV BLD AUTO: 9 FL (ref 9–12.9)
POTASSIUM SERPL-SCNC: 4.6 MMOL/L (ref 3.6–5.5)
PROT SERPL-MCNC: 7.9 G/DL (ref 6–8.2)
RBC # BLD AUTO: 4.87 M/UL (ref 4.7–6.1)
SODIUM SERPL-SCNC: 141 MMOL/L (ref 135–145)
T4 FREE SERPL-MCNC: 1.37 NG/DL (ref 0.93–1.7)
TRIGL SERPL-MCNC: 84 MG/DL (ref 0–149)
TSH SERPL DL<=0.005 MIU/L-ACNC: 4.18 UIU/ML (ref 0.38–5.33)
WBC # BLD AUTO: 7.5 K/UL (ref 4.8–10.8)

## 2021-12-15 PROCEDURE — 36415 COLL VENOUS BLD VENIPUNCTURE: CPT

## 2021-12-15 PROCEDURE — 85027 COMPLETE CBC AUTOMATED: CPT

## 2021-12-15 PROCEDURE — 84439 ASSAY OF FREE THYROXINE: CPT

## 2021-12-15 PROCEDURE — 84443 ASSAY THYROID STIM HORMONE: CPT

## 2021-12-15 PROCEDURE — 80061 LIPID PANEL: CPT

## 2021-12-15 PROCEDURE — 80053 COMPREHEN METABOLIC PANEL: CPT

## 2022-01-01 ENCOUNTER — OFFICE VISIT (OUTPATIENT)
Dept: URGENT CARE | Facility: PHYSICIAN GROUP | Age: 67
End: 2022-01-01
Payer: MEDICARE

## 2022-01-01 ENCOUNTER — HOSPITAL ENCOUNTER (OUTPATIENT)
Dept: LAB | Facility: MEDICAL CENTER | Age: 67
End: 2022-12-29
Attending: PAIN MEDICINE
Payer: MEDICARE

## 2022-01-01 ENCOUNTER — OFFICE VISIT (OUTPATIENT)
Dept: INTERNAL MEDICINE | Facility: IMAGING CENTER | Age: 67
End: 2022-01-01
Payer: MEDICARE

## 2022-01-01 ENCOUNTER — HOSPITAL ENCOUNTER (OUTPATIENT)
Facility: REHABILITATION | Age: 67
End: 2022-09-28
Attending: PAIN MEDICINE | Admitting: PAIN MEDICINE
Payer: MEDICARE

## 2022-01-01 ENCOUNTER — RESEARCH ENCOUNTER (OUTPATIENT)
Dept: MEDICAL GROUP | Facility: PHYSICIAN GROUP | Age: 67
End: 2022-01-01
Payer: MEDICARE

## 2022-01-01 ENCOUNTER — APPOINTMENT (OUTPATIENT)
Dept: RADIOLOGY | Facility: REHABILITATION | Age: 67
End: 2022-01-01
Attending: PAIN MEDICINE
Payer: MEDICARE

## 2022-01-01 ENCOUNTER — TELEMEDICINE (OUTPATIENT)
Dept: MEDICAL GROUP | Facility: PHYSICIAN GROUP | Age: 67
End: 2022-01-01
Payer: MEDICARE

## 2022-01-01 VITALS
HEART RATE: 66 BPM | WEIGHT: 147 LBS | OXYGEN SATURATION: 93 % | RESPIRATION RATE: 16 BRPM | DIASTOLIC BLOOD PRESSURE: 70 MMHG | TEMPERATURE: 98.7 F | HEIGHT: 69 IN | SYSTOLIC BLOOD PRESSURE: 112 MMHG | BODY MASS INDEX: 21.77 KG/M2

## 2022-01-01 VITALS
HEIGHT: 69 IN | WEIGHT: 150.79 LBS | HEART RATE: 65 BPM | TEMPERATURE: 97.6 F | OXYGEN SATURATION: 92 % | BODY MASS INDEX: 22.33 KG/M2 | RESPIRATION RATE: 16 BRPM | DIASTOLIC BLOOD PRESSURE: 67 MMHG | SYSTOLIC BLOOD PRESSURE: 116 MMHG

## 2022-01-01 VITALS
BODY MASS INDEX: 23.55 KG/M2 | WEIGHT: 159 LBS | DIASTOLIC BLOOD PRESSURE: 78 MMHG | HEIGHT: 69 IN | HEART RATE: 83 BPM | SYSTOLIC BLOOD PRESSURE: 122 MMHG

## 2022-01-01 VITALS
DIASTOLIC BLOOD PRESSURE: 84 MMHG | HEIGHT: 69 IN | WEIGHT: 159 LBS | BODY MASS INDEX: 23.55 KG/M2 | SYSTOLIC BLOOD PRESSURE: 136 MMHG | HEART RATE: 78 BPM | RESPIRATION RATE: 12 BRPM | TEMPERATURE: 98.2 F | OXYGEN SATURATION: 93 %

## 2022-01-01 DIAGNOSIS — B02.9 HERPES ZOSTER WITHOUT COMPLICATION: ICD-10-CM

## 2022-01-01 DIAGNOSIS — Z72.0 TOBACCO ABUSE DISORDER: ICD-10-CM

## 2022-01-01 DIAGNOSIS — R63.4 UNINTENDED WEIGHT LOSS: ICD-10-CM

## 2022-01-01 DIAGNOSIS — C45.0 MALIGNANT MESOTHELIOMA OF PLEURA (HCC): ICD-10-CM

## 2022-01-01 DIAGNOSIS — Z00.6 RESEARCH STUDY PATIENT: ICD-10-CM

## 2022-01-01 DIAGNOSIS — Z23 NEED FOR VACCINATION: ICD-10-CM

## 2022-01-01 DIAGNOSIS — F51.01 PRIMARY INSOMNIA: ICD-10-CM

## 2022-01-01 DIAGNOSIS — I10 ESSENTIAL HYPERTENSION: ICD-10-CM

## 2022-01-01 LAB
ALBUMIN SERPL BCP-MCNC: 2.8 G/DL (ref 3.2–4.9)
ALBUMIN/GLOB SERPL: 0.5 G/DL
ALP SERPL-CCNC: 215 U/L (ref 30–99)
ALT SERPL-CCNC: 10 U/L (ref 2–50)
ANION GAP SERPL CALC-SCNC: 8 MMOL/L (ref 7–16)
ANISOCYTOSIS BLD QL SMEAR: ABNORMAL
APOB+LDLR+PCSK9 GENE MUT ANL BLD/T: NOT DETECTED
APPEARANCE UR: CLEAR
AST SERPL-CCNC: 13 U/L (ref 12–45)
BASOPHILS # BLD AUTO: 0.8 % (ref 0–1.8)
BASOPHILS # BLD: 0.08 K/UL (ref 0–0.12)
BILIRUB SERPL-MCNC: 0.2 MG/DL (ref 0.1–1.5)
BILIRUB UR QL STRIP.AUTO: NEGATIVE
BRCA1+BRCA2 DEL+DUP + FULL MUT ANL BLD/T: NOT DETECTED
BUN SERPL-MCNC: 19 MG/DL (ref 8–22)
CALCIUM ALBUM COR SERPL-MCNC: 10.5 MG/DL (ref 8.5–10.5)
CALCIUM SERPL-MCNC: 9.5 MG/DL (ref 8.5–10.5)
CHLORIDE SERPL-SCNC: 98 MMOL/L (ref 96–112)
CO2 SERPL-SCNC: 29 MMOL/L (ref 20–33)
COLOR UR: YELLOW
CREAT SERPL-MCNC: 0.6 MG/DL (ref 0.5–1.4)
EOSINOPHIL # BLD AUTO: 0.09 K/UL (ref 0–0.51)
EOSINOPHIL NFR BLD: 0.9 % (ref 0–6.9)
ERYTHROCYTE [DISTWIDTH] IN BLOOD BY AUTOMATED COUNT: 51.9 FL (ref 35.9–50)
GFR SERPLBLD CREATININE-BSD FMLA CKD-EPI: 106 ML/MIN/1.73 M 2
GLOBULIN SER CALC-MCNC: 5.5 G/DL (ref 1.9–3.5)
GLUCOSE SERPL-MCNC: 103 MG/DL (ref 65–99)
GLUCOSE UR STRIP.AUTO-MCNC: NEGATIVE MG/DL
HCT VFR BLD AUTO: 33.9 % (ref 42–52)
HGB BLD-MCNC: 9.7 G/DL (ref 14–18)
KETONES UR STRIP.AUTO-MCNC: NEGATIVE MG/DL
LEUKOCYTE ESTERASE UR QL STRIP.AUTO: NEGATIVE
LYMPHOCYTES # BLD AUTO: 0.67 K/UL (ref 1–4.8)
LYMPHOCYTES NFR BLD: 6.8 % (ref 22–41)
MANUAL DIFF BLD: NORMAL
MCH RBC QN AUTO: 23 PG (ref 27–33)
MCHC RBC AUTO-ENTMCNC: 28.6 G/DL (ref 33.7–35.3)
MCV RBC AUTO: 80.3 FL (ref 81.4–97.8)
MICRO URNS: NORMAL
MICROCYTES BLD QL SMEAR: ABNORMAL
MLH1+MSH2+MSH6+PMS2 GN DEL+DUP+FUL M: NOT DETECTED
MONOCYTES # BLD AUTO: 0.74 K/UL (ref 0–0.85)
MONOCYTES NFR BLD AUTO: 7.6 % (ref 0–13.4)
MORPHOLOGY BLD-IMP: NORMAL
NEUTROPHILS # BLD AUTO: 8.22 K/UL (ref 1.82–7.42)
NEUTROPHILS NFR BLD: 83.9 % (ref 44–72)
NITRITE UR QL STRIP.AUTO: NEGATIVE
NRBC # BLD AUTO: 0 K/UL
NRBC BLD-RTO: 0 /100 WBC
PH UR STRIP.AUTO: 6.5 [PH] (ref 5–8)
PLATELET # BLD AUTO: 644 K/UL (ref 164–446)
PLATELET BLD QL SMEAR: NORMAL
PMV BLD AUTO: 10.2 FL (ref 9–12.9)
POLYCHROMASIA BLD QL SMEAR: NORMAL
POTASSIUM SERPL-SCNC: 4.1 MMOL/L (ref 3.6–5.5)
PROT SERPL-MCNC: 8.3 G/DL (ref 6–8.2)
PROT UR QL STRIP: NEGATIVE MG/DL
RBC # BLD AUTO: 4.22 M/UL (ref 4.7–6.1)
RBC BLD AUTO: PRESENT
RBC UR QL AUTO: NEGATIVE
ROULEAUX BLD QL SMEAR: NORMAL
SODIUM SERPL-SCNC: 135 MMOL/L (ref 135–145)
SP GR UR STRIP.AUTO: 1.01
UROBILINOGEN UR STRIP.AUTO-MCNC: 0.2 MG/DL
WBC # BLD AUTO: 9.8 K/UL (ref 4.8–10.8)

## 2022-01-01 PROCEDURE — G0009 ADMIN PNEUMOCOCCAL VACCINE: HCPCS | Performed by: FAMILY MEDICINE

## 2022-01-01 PROCEDURE — 700117 HCHG RX CONTRAST REV CODE 255

## 2022-01-01 PROCEDURE — 85007 BL SMEAR W/DIFF WBC COUNT: CPT

## 2022-01-01 PROCEDURE — 85025 COMPLETE CBC W/AUTO DIFF WBC: CPT

## 2022-01-01 PROCEDURE — 99152 MOD SED SAME PHYS/QHP 5/>YRS: CPT

## 2022-01-01 PROCEDURE — 99214 OFFICE O/P EST MOD 30 MIN: CPT | Mod: 95 | Performed by: FAMILY MEDICINE

## 2022-01-01 PROCEDURE — G0008 ADMIN INFLUENZA VIRUS VAC: HCPCS | Performed by: FAMILY MEDICINE

## 2022-01-01 PROCEDURE — 90662 IIV NO PRSV INCREASED AG IM: CPT | Performed by: FAMILY MEDICINE

## 2022-01-01 PROCEDURE — 36415 COLL VENOUS BLD VENIPUNCTURE: CPT

## 2022-01-01 PROCEDURE — 700111 HCHG RX REV CODE 636 W/ 250 OVERRIDE (IP)

## 2022-01-01 PROCEDURE — 99213 OFFICE O/P EST LOW 20 MIN: CPT | Performed by: STUDENT IN AN ORGANIZED HEALTH CARE EDUCATION/TRAINING PROGRAM

## 2022-01-01 PROCEDURE — 80053 COMPREHEN METABOLIC PANEL: CPT

## 2022-01-01 PROCEDURE — 81003 URINALYSIS AUTO W/O SCOPE: CPT

## 2022-01-01 PROCEDURE — 90677 PCV20 VACCINE IM: CPT | Performed by: FAMILY MEDICINE

## 2022-01-01 PROCEDURE — 99214 OFFICE O/P EST MOD 30 MIN: CPT | Mod: 25 | Performed by: FAMILY MEDICINE

## 2022-01-01 PROCEDURE — 62321 NJX INTERLAMINAR CRV/THRC: CPT

## 2022-01-01 RX ORDER — ZOLPIDEM TARTRATE 10 MG/1
10 TABLET ORAL
Qty: 90 EACH | Refills: 0 | Status: SHIPPED | OUTPATIENT
Start: 2022-01-01 | End: 2022-01-01 | Stop reason: SDUPTHER

## 2022-01-01 RX ORDER — TRIAMCINOLONE ACETONIDE 40 MG/ML
INJECTION, SUSPENSION INTRA-ARTICULAR; INTRAMUSCULAR
Status: COMPLETED
Start: 2022-01-01 | End: 2022-01-01

## 2022-01-01 RX ORDER — MIDAZOLAM HYDROCHLORIDE 1 MG/ML
INJECTION INTRAMUSCULAR; INTRAVENOUS
Status: COMPLETED
Start: 2022-01-01 | End: 2022-01-01

## 2022-01-01 RX ORDER — VALACYCLOVIR HYDROCHLORIDE 1 G/1
1000 TABLET, FILM COATED ORAL 3 TIMES DAILY
Qty: 21 TABLET | Refills: 0 | Status: SHIPPED | OUTPATIENT
Start: 2022-01-01 | End: 2022-01-01

## 2022-01-01 RX ORDER — ONDANSETRON 2 MG/ML
4 INJECTION INTRAMUSCULAR; INTRAVENOUS
Status: DISCONTINUED | OUTPATIENT
Start: 2022-01-01 | End: 2022-01-01 | Stop reason: HOSPADM

## 2022-01-01 RX ORDER — ZOLPIDEM TARTRATE 5 MG/1
5 TABLET ORAL NIGHTLY PRN
COMMUNITY
End: 2022-01-01

## 2022-01-01 RX ORDER — DEXAMETHASONE SODIUM PHOSPHATE 10 MG/ML
INJECTION, SOLUTION INTRAMUSCULAR; INTRAVENOUS
Status: COMPLETED
Start: 2022-01-01 | End: 2022-01-01

## 2022-01-01 RX ORDER — BUPIVACAINE HYDROCHLORIDE 2.5 MG/ML
INJECTION, SOLUTION EPIDURAL; INFILTRATION; INTRACAUDAL
Status: COMPLETED
Start: 2022-01-01 | End: 2022-01-01

## 2022-01-01 RX ORDER — ZOLPIDEM TARTRATE 10 MG/1
10 TABLET ORAL
Qty: 90 EACH | Refills: 0 | Status: SHIPPED | OUTPATIENT
Start: 2022-01-01 | End: 2023-01-01

## 2022-01-01 RX ORDER — LIDOCAINE HYDROCHLORIDE 10 MG/ML
INJECTION, SOLUTION EPIDURAL; INFILTRATION; INTRACAUDAL; PERINEURAL
Status: COMPLETED
Start: 2022-01-01 | End: 2022-01-01

## 2022-01-01 RX ADMIN — IOHEXOL 5 ML: 240 INJECTION, SOLUTION INTRATHECAL; INTRAVASCULAR; INTRAVENOUS; ORAL at 13:41

## 2022-01-01 RX ADMIN — MIDAZOLAM HYDROCHLORIDE 2 MG: 1 INJECTION, SOLUTION INTRAMUSCULAR; INTRAVENOUS at 13:37

## 2022-01-01 RX ADMIN — LIDOCAINE HYDROCHLORIDE 5 ML: 10 INJECTION, SOLUTION EPIDURAL; INFILTRATION; INTRACAUDAL; PERINEURAL at 13:40

## 2022-01-01 RX ADMIN — TRIAMCINOLONE ACETONIDE 80 MG: 40 INJECTION, SUSPENSION INTRA-ARTICULAR; INTRAMUSCULAR at 13:41

## 2022-01-01 RX ADMIN — BUPIVACAINE HYDROCHLORIDE 10 ML: 2.5 INJECTION, SOLUTION EPIDURAL; INFILTRATION; INTRACAUDAL; PERINEURAL at 13:40

## 2022-01-01 ASSESSMENT — PAIN DESCRIPTION - PAIN TYPE
TYPE: CHRONIC PAIN
TYPE: CHRONIC PAIN

## 2022-01-01 ASSESSMENT — FIBROSIS 4 INDEX
FIB4 SCORE: 0.68

## 2022-01-07 ENCOUNTER — OFFICE VISIT (OUTPATIENT)
Dept: INTERNAL MEDICINE | Facility: IMAGING CENTER | Age: 67
End: 2022-01-07
Payer: MEDICARE

## 2022-01-07 VITALS
HEIGHT: 69 IN | BODY MASS INDEX: 24.14 KG/M2 | WEIGHT: 163 LBS | TEMPERATURE: 97.8 F | OXYGEN SATURATION: 96 % | RESPIRATION RATE: 16 BRPM | SYSTOLIC BLOOD PRESSURE: 124 MMHG | DIASTOLIC BLOOD PRESSURE: 70 MMHG | HEART RATE: 66 BPM

## 2022-01-07 DIAGNOSIS — I48.3 TYPICAL ATRIAL FLUTTER (HCC): ICD-10-CM

## 2022-01-07 DIAGNOSIS — Z72.0 TOBACCO ABUSE DISORDER: ICD-10-CM

## 2022-01-07 DIAGNOSIS — M51.26 DISPLACEMENT OF LUMBAR INTERVERTEBRAL DISC WITHOUT MYELOPATHY: ICD-10-CM

## 2022-01-07 DIAGNOSIS — M51.06 DISC DISEASE WITH MYELOPATHY, LUMBAR: ICD-10-CM

## 2022-01-07 DIAGNOSIS — E78.2 MIXED HYPERLIPIDEMIA: ICD-10-CM

## 2022-01-07 DIAGNOSIS — R63.4 UNINTENDED WEIGHT LOSS: ICD-10-CM

## 2022-01-07 DIAGNOSIS — C45.0 MALIGNANT MESOTHELIOMA OF PLEURA (HCC): ICD-10-CM

## 2022-01-07 DIAGNOSIS — Z23 NEED FOR VACCINATION: ICD-10-CM

## 2022-01-07 DIAGNOSIS — F51.01 PRIMARY INSOMNIA: ICD-10-CM

## 2022-01-07 DIAGNOSIS — F33.1 MODERATE EPISODE OF RECURRENT MAJOR DEPRESSIVE DISORDER (HCC): ICD-10-CM

## 2022-01-07 DIAGNOSIS — I10 ESSENTIAL HYPERTENSION: ICD-10-CM

## 2022-01-07 DIAGNOSIS — E03.9 ACQUIRED HYPOTHYROIDISM: ICD-10-CM

## 2022-01-07 PROBLEM — Z79.899 CONTROLLED SUBSTANCE AGREEMENT SIGNED: Status: RESOLVED | Noted: 2019-01-03 | Resolved: 2022-01-07

## 2022-01-07 PROBLEM — R14.0 ABDOMINAL DISTENTION, NON-GASEOUS: Status: RESOLVED | Noted: 2020-02-06 | Resolved: 2022-01-07

## 2022-01-07 PROCEDURE — 99214 OFFICE O/P EST MOD 30 MIN: CPT | Mod: 25 | Performed by: FAMILY MEDICINE

## 2022-01-07 PROCEDURE — 90662 IIV NO PRSV INCREASED AG IM: CPT | Performed by: FAMILY MEDICINE

## 2022-01-07 PROCEDURE — G0008 ADMIN INFLUENZA VIRUS VAC: HCPCS | Performed by: FAMILY MEDICINE

## 2022-01-07 RX ORDER — BUPRENORPHINE 5 UG/H
PATCH TRANSDERMAL
COMMUNITY
Start: 2021-11-15 | End: 2022-01-07

## 2022-01-07 RX ORDER — BUPRENORPHINE 7.5 UG/H
PATCH TRANSDERMAL
COMMUNITY
Start: 2021-10-14 | End: 2022-01-07

## 2022-01-07 RX ORDER — OXYCODONE HYDROCHLORIDE 10 MG/1
TABLET ORAL
COMMUNITY
Start: 2021-12-14

## 2022-01-07 RX ORDER — ZOLPIDEM TARTRATE 10 MG/1
10 TABLET ORAL NIGHTLY PRN
Qty: 90 TABLET | Refills: 0 | Status: SHIPPED | OUTPATIENT
Start: 2022-01-07 | End: 2022-04-08

## 2022-01-07 RX ORDER — METOPROLOL SUCCINATE 25 MG/1
TABLET, EXTENDED RELEASE ORAL
COMMUNITY
Start: 2021-10-10 | End: 2022-01-07

## 2022-01-07 RX ORDER — METHOCARBAMOL 750 MG/1
TABLET ORAL
COMMUNITY
Start: 2021-11-08 | End: 2022-01-01

## 2022-01-07 RX ORDER — ALBUTEROL SULFATE 90 UG/1
AEROSOL, METERED RESPIRATORY (INHALATION)
COMMUNITY
Start: 2021-12-12 | End: 2022-01-01

## 2022-01-07 RX ORDER — LISINOPRIL 5 MG/1
TABLET ORAL
COMMUNITY
Start: 2021-01-04 | End: 2022-01-07 | Stop reason: SDUPTHER

## 2022-01-07 RX ORDER — LISINOPRIL 5 MG/1
5 TABLET ORAL DAILY
Qty: 90 TABLET | Refills: 3 | Status: SHIPPED | OUTPATIENT
Start: 2022-01-07 | End: 2023-01-01

## 2022-01-07 RX ORDER — LEVOTHYROXINE SODIUM 88 UG/1
88 TABLET ORAL
Qty: 90 TABLET | Refills: 3 | Status: SHIPPED | OUTPATIENT
Start: 2022-01-07 | End: 2023-01-01

## 2022-01-07 RX ORDER — CEPHALEXIN 500 MG/1
500 CAPSULE ORAL 2 TIMES DAILY
COMMUNITY
Start: 2021-10-22 | End: 2022-01-07

## 2022-01-07 RX ORDER — LISINOPRIL 5 MG/1
TABLET ORAL
COMMUNITY
Start: 2021-10-10 | End: 2022-01-07

## 2022-01-07 ASSESSMENT — FIBROSIS 4 INDEX: FIB4 SCORE: 0.67

## 2022-01-07 ASSESSMENT — PATIENT HEALTH QUESTIONNAIRE - PHQ9
7. TROUBLE CONCENTRATING ON THINGS, SUCH AS READING THE NEWSPAPER OR WATCHING TELEVISION: NOT AT ALL
5. POOR APPETITE OR OVEREATING: NOT AT ALL
3. TROUBLE FALLING OR STAYING ASLEEP OR SLEEPING TOO MUCH: SEVERAL DAYS
9. THOUGHTS THAT YOU WOULD BE BETTER OFF DEAD, OR OF HURTING YOURSELF: NOT AT ALL
4. FEELING TIRED OR HAVING LITTLE ENERGY: SEVERAL DAYS
1. LITTLE INTEREST OR PLEASURE IN DOING THINGS: NOT AT ALL
8. MOVING OR SPEAKING SO SLOWLY THAT OTHER PEOPLE COULD HAVE NOTICED. OR THE OPPOSITE, BEING SO FIGETY OR RESTLESS THAT YOU HAVE BEEN MOVING AROUND A LOT MORE THAN USUAL: NOT AT ALL
SUM OF ALL RESPONSES TO PHQ QUESTIONS 1-9: 2
SUM OF ALL RESPONSES TO PHQ9 QUESTIONS 1 AND 2: 0
6. FEELING BAD ABOUT YOURSELF - OR THAT YOU ARE A FAILURE OR HAVE LET YOURSELF OR YOUR FAMILY DOWN: NOT AL ALL
2. FEELING DOWN, DEPRESSED, IRRITABLE, OR HOPELESS: NOT AT ALL

## 2022-01-07 NOTE — ASSESSMENT & PLAN NOTE
This is an acquired chronic health problem for this patient for which she is on levothyroxine 88 mcg daily.  His TSH is in the normal range at 4.180 and his free T4 is normal at 1.37.  We will continue that dosing long-term.

## 2022-01-07 NOTE — PROGRESS NOTES
CC: Diagnoses of Malignant mesothelioma of pleura (HCC), Essential hypertension, Disc disease with myelopathy, lumbar, Moderate episode of recurrent major depressive disorder (HCC), Typical atrial flutter (HCC), Unintended weight loss, Tobacco abuse disorder, Need for vaccination, Primary insomnia, Displacement of lumbar intervertebral disc without myelopathy, Mixed hyperlipidemia, and Acquired hypothyroidism were pertinent to this visit.                                                                                                                                         HPI:   Marcial presents today with the following concerns:    1. Malignant mesothelioma of pleura (HCC)  Chronic health problem completely uncontrolled because the patient cannot tolerate chemotherapy or immunotherapy.  We are continuing to monitor for progression.  It is time to do his annual CT scan of the chest to assess for progression of disease.    2. Essential hypertension  Chronic health problems its been well controlled with current meds.  Now that his back pain is under better control he seems to be doing better all the way around.    3. Disc disease with myelopathy, lumbar  Chronic health problem adequately controlled through pain management    4. Moderate episode of recurrent major depressive disorder (HCC)  Chronic health problem adequately controlled without medication.  His PHQ-9 came back at a 2.    Depression Screen (PHQ-2/PHQ-9) 4/1/2019 5/6/2020 1/7/2022   PHQ-2 Total Score 6 - -   PHQ-2 Total Score - 3 0   PHQ-9 Total Score 13 - -   PHQ-9 Total Score - 8 2       Interpretation of PHQ-9 Total Score   Score Severity   1-4 No Depression   5-9 Mild Depression   10-14 Moderate Depression   15-19 Moderately Severe Depression   20-27 Severe Depression      5. Typical atrial flutter (HCC)  Chronic health problem that has resolved    6. Unintended weight loss  This is a new problem for this patient who has lost 20 pounds over the last  4 months.  Most of this was secondary to having to be on chronic narcotics and it constipating him, causing abdominal pain and taking away his appetite.  He is now on a minimal dose of narcotic and more hopeful will see improvement    7. Tobacco abuse disorder  Chronic health problem for which the patient continues to chew but is cutting back    8. Need for vaccination  Flu shot given today    9. Primary insomnia  Chronic health problem that still requires daily Ambien 10 mg to have adequate sleep.  He has seen some improvement with better pain control.  It is time for refill of his Ambien and his  is appropriate.    10. Displacement of lumbar intervertebral disc without myelopathy  Chronic health problem handled by pain management    11. Mixed hyperlipidemia  Previous chronic health problem now well controlled with lifestyle    12. Acquired hypothyroidism  Chronic health problem for which she is on levothyroxine 88 mcg.  We did lab work which came back showing his TSH is normal at 4.180 and his free T4 is normal at 1.37.  We will not adjust medications.       Patient Active Problem List    Diagnosis Date Noted   • Unintended weight loss 01/07/2022   • Acquired hypothyroidism 01/07/2022   • Chronic low back pain 01/18/2021   • Chronic, continuous use of opioids 01/18/2021   • Lumbar post-laminectomy syndrome 01/18/2021   • Cancer associated pain 11/19/2020   • Hypoxia 10/22/2020   • Combined forms of age-related cataract of both eyes 02/04/2020   • Malignant mesothelioma of pleura (HCC) 03/11/2019   • S/P ablation of atrial flutter 01/10/2019   • Statin intolerance 01/10/2019   • Primary insomnia 01/03/2019   • Health care maintenance 01/03/2019   • Osteoarthrosis 02/23/2018   • Tobacco abuse disorder 10/18/2011   • Vitamin D deficiency 06/04/2010   • Displacement of lumbar intervertebral disc without myelopathy    • Essential hypertension    • Mixed hyperlipidemia    • Esophageal reflux    • Disturbance in sleep  "behavior    • Palpitations    • Disc disease with myelopathy, lumbar        Current Outpatient Medications   Medication Sig Dispense Refill   • albuterol 108 (90 Base) MCG/ACT Aero Soln inhalation aerosol      • oxyCODONE immediate release (ROXICODONE) 10 MG immediate release tablet      • D3-50 1.25 MG (30127 UT) Cap      • levothyroxine (SYNTHROID) 88 MCG Tab Take 1 Tablet by mouth every morning on an empty stomach. 90 Tablet 3   • lisinopril (PRINIVIL) 5 MG Tab Take 1 Tablet by mouth every day. 90 Tablet 3   • metoprolol tartrate (LOPRESSOR) 25 MG Tab Take 1 Tablet by mouth 2 times a day. 180 Tablet 3   • zolpidem (AMBIEN) 10 MG Tab Take 1 Tablet by mouth at bedtime as needed for Sleep for up to 90 days. 90 Tablet 0   • Naloxone (NARCAN) 4 MG/0.1ML Liquid Indications: Opioid Overdose. Instruct patient in Emergency Opioid Overdose Usage.     • tamsulosin (FLOMAX) 0.4 MG capsule Take 2 Caps by mouth every bedtime. 180 Cap 3   • magnesium oxide (MAG-OX) 400 MG Tab Take 400 mg by mouth every day.     • Calcium-Magnesium-Vitamin D (CALCIUM 1200+D3 PO) Take 1 Cap by mouth 2 Times a Day.       No current facility-administered medications for this visit.         Allergies as of 01/07/2022 - Reviewed 01/07/2022   Allergen Reaction Noted   • Atorvastatin  01/10/2019   • Hmg-coa-r inhibitors Unspecified 01/18/2021            /70 (BP Location: Right arm, Patient Position: Sitting, BP Cuff Size: Adult)   Pulse 66   Temp 36.6 °C (97.8 °F) (Temporal)   Resp 16   Ht 1.753 m (5' 9\")   Wt 73.9 kg (163 lb)   SpO2 96%   BMI 24.07 kg/m²     Physical Exam:  Gen:         Alert and oriented, No apparent distress.  Neck:        No Lymphadenopathy or Bruits.  Lungs:     Clear to auscultation bilaterally  CV:          Regular rate and rhythm. No murmurs, rubs or gallops.               Ext:          No clubbing, cyanosis, edema.    LABS: 12/15/2021: Results reviewed and discussed with the patient, questions " answered.      Assessment and Plan.   66 y.o. male with the following issues:    Malignant mesothelioma of pleura (HCC)  This is a chronic health problem that the patient unfortunately cannot tolerate any chemo therapy.  He is accepting of what is coming.  He does need to have a Ct scan of his chest to look at current sizing of his tumor.  He is complaining of increased weakness and SOB overall.    Essential hypertension  This is a chronic health problem for which he is doing good with meds and lifestyle.  Patient denies shortness of breath dyspnea on exertion or chest pain.  His blood pressure today is excellent at 124/70.    Disc disease with myelopathy, lumbar  This is been a chronic health problem for this patient where he finally got a pain stimulator which has remarkably improved his back pain.  He continues to have to use oxycodone because they cannot turn the stimulator on at maximum dosing.  He continues to work with the pain management specialist.    Unintended weight loss  This patient has lost about 20 lbs over the last 3 months.  Part of that came with his having to use chronic narcotics.      Tobacco abuse disorder  This is a chronic health problem where he is now down to a chew a day.    Primary insomnia  This is a chronic health problem for this patient that has gotten worse with developing his Mesothelioma.    Displacement of lumbar intervertebral disc without myelopathy  This is a chronic health problem for this patient that finally has started to see some relief with his pain stimulator.  They are in the process of ramping this up.  Hopefully he will be able to get off of narcotics completely.    Mixed hyperlipidemia  This is a chronic health problem for this patient that is well controlled with current lifestyle.  His most recent blood work done 12/15/2021 shows his total cholesterol is down to 181, triglycerides 84, HDL 36 .  In light of the fact this gentleman has mesothelioma and is  terminal, we will not place him on a statin.    Moderate episode of recurrent major depressive disorder (HCC)  This has been a chronic health problem for this patient in the past for which we placed him on fluoxetine.  He scored a 2 on his PHQ 9 today and has taken himself off of fluoxetine.  He absolutely denies suicidal or homicidal ideation.  He will continue off medication.    Acquired hypothyroidism  This is an acquired chronic health problem for this patient for which she is on levothyroxine 88 mcg daily.  His TSH is in the normal range at 4.180 and his free T4 is normal at 1.37.  We will continue that dosing long-term.

## 2022-01-07 NOTE — ASSESSMENT & PLAN NOTE
This has been a chronic health problem for this patient in the past for which we placed him on fluoxetine.  He scored a 2 on his PHQ 9 today and has taken himself off of fluoxetine.  He absolutely denies suicidal or homicidal ideation.  He will continue off medication.

## 2022-01-07 NOTE — ASSESSMENT & PLAN NOTE
This is a chronic health problem for this patient that is well controlled with current lifestyle.  His most recent blood work done 12/15/2021 shows his total cholesterol is down to 181, triglycerides 84, HDL 36 .  In light of the fact this gentleman has mesothelioma and is terminal, we will not place him on a statin.

## 2022-01-07 NOTE — ASSESSMENT & PLAN NOTE
This is a chronic health problem for this patient that has gotten worse with developing his Mesothelioma.

## 2022-01-07 NOTE — ASSESSMENT & PLAN NOTE
This patient has lost about 20 lbs over the last 3 months.  Part of that came with his having to use chronic narcotics.

## 2022-01-07 NOTE — ASSESSMENT & PLAN NOTE
This is a chronic health problem for which he is doing good with meds and lifestyle.  Patient denies shortness of breath dyspnea on exertion or chest pain.  His blood pressure today is excellent at 124/70.

## 2022-01-07 NOTE — ASSESSMENT & PLAN NOTE
This is a chronic health problem for this patient that finally has started to see some relief with his pain stimulator.  They are in the process of ramping this up.  Hopefully he will be able to get off of narcotics completely.

## 2022-01-07 NOTE — ASSESSMENT & PLAN NOTE
This is been a chronic health problem for this patient where he finally got a pain stimulator which has remarkably improved his back pain.  He continues to have to use oxycodone because they cannot turn the stimulator on at maximum dosing.  He continues to work with the pain management specialist.

## 2022-01-07 NOTE — ASSESSMENT & PLAN NOTE
This is a chronic health problem that the patient unfortunately cannot tolerate any chemo therapy.  He is accepting of what is coming.  He does need to have a Ct scan of his chest to look at current sizing of his tumor.  He is complaining of increased weakness and SOB overall.

## 2022-02-02 ENCOUNTER — HOSPITAL ENCOUNTER (OUTPATIENT)
Dept: RADIOLOGY | Facility: MEDICAL CENTER | Age: 67
End: 2022-02-02
Attending: FAMILY MEDICINE
Payer: MEDICARE

## 2022-02-02 DIAGNOSIS — C45.0 MALIGNANT MESOTHELIOMA OF PLEURA (HCC): ICD-10-CM

## 2022-02-02 PROCEDURE — 700117 HCHG RX CONTRAST REV CODE 255: Performed by: FAMILY MEDICINE

## 2022-02-02 PROCEDURE — 71260 CT THORAX DX C+: CPT | Mod: ME

## 2022-02-02 RX ADMIN — IOHEXOL 75 ML: 350 INJECTION, SOLUTION INTRAVENOUS at 10:31

## 2022-04-08 ENCOUNTER — OFFICE VISIT (OUTPATIENT)
Dept: INTERNAL MEDICINE | Facility: IMAGING CENTER | Age: 67
End: 2022-04-08
Payer: MEDICARE

## 2022-04-08 VITALS
BODY MASS INDEX: 23.96 KG/M2 | HEART RATE: 46 BPM | OXYGEN SATURATION: 94 % | WEIGHT: 161.8 LBS | RESPIRATION RATE: 16 BRPM | DIASTOLIC BLOOD PRESSURE: 58 MMHG | SYSTOLIC BLOOD PRESSURE: 102 MMHG | HEIGHT: 69 IN | TEMPERATURE: 96.7 F

## 2022-04-08 DIAGNOSIS — C45.0 MALIGNANT MESOTHELIOMA OF PLEURA (HCC): ICD-10-CM

## 2022-04-08 DIAGNOSIS — I10 ESSENTIAL HYPERTENSION: ICD-10-CM

## 2022-04-08 DIAGNOSIS — G89.3 CANCER ASSOCIATED PAIN: ICD-10-CM

## 2022-04-08 DIAGNOSIS — F51.01 PRIMARY INSOMNIA: ICD-10-CM

## 2022-04-08 DIAGNOSIS — Z13.79 GENETIC SCREENING: ICD-10-CM

## 2022-04-08 DIAGNOSIS — R09.02 HYPOXIA: ICD-10-CM

## 2022-04-08 PROCEDURE — 99214 OFFICE O/P EST MOD 30 MIN: CPT | Performed by: FAMILY MEDICINE

## 2022-04-08 RX ORDER — ZOLPIDEM TARTRATE 10 MG/1
10 TABLET ORAL
Qty: 90 EACH | Refills: 0 | Status: SHIPPED | OUTPATIENT
Start: 2022-04-08 | End: 2022-01-01 | Stop reason: SDUPTHER

## 2022-04-08 RX ORDER — FLUOXETINE HYDROCHLORIDE 40 MG/1
40 CAPSULE ORAL DAILY
Qty: 90 CAPSULE | Refills: 3 | Status: SHIPPED | OUTPATIENT
Start: 2022-04-08 | End: 2023-01-01

## 2022-04-08 ASSESSMENT — FIBROSIS 4 INDEX: FIB4 SCORE: 0.68

## 2022-04-08 NOTE — PROGRESS NOTES
This patient has mesothelioma and has a father who  from prostate cancer he has chosen to participate in the healthy [place] project.      CC: Diagnoses of Malignant mesothelioma of pleura (HCC), Hypoxia, Primary insomnia, Genetic screening, Essential hypertension, and Cancer associated pain were pertinent to this visit.                                                                                                                                         HPI:   Marcial presents today with the following concerns:    1. Malignant mesothelioma of pleura (HCC)  Chronic health problems stable but showing definite progression    2. Hypoxia  Chronic health problem controlled with supplemental oxygen as needed    3. Primary insomnia  Chronic health problem, well controlled, continue with current meds and lifestyle.      4. Genetic screening  Patient planning to do the healthy [place] project    5. Essential hypertension  Possibly overcontrolled chronic problem.  We are going to lower his metoprolol to 25 mg nightly and continue lisinopril 5 mg in the morning.  I am hopeful that we will get his pulse greater than 60.  We will adjust doses of the beta-blocker until we do.    6. Cancer associated pain  Chronic health problem for this patient that has been completely managed with his pain pump.       Patient Active Problem List    Diagnosis Date Noted   • Unintended weight loss 2022   • Acquired hypothyroidism 2022   • Chronic low back pain 2021   • Chronic, continuous use of opioids 2021   • Lumbar post-laminectomy syndrome 2021   • Cancer associated pain 2020   • Hypoxia 10/22/2020   • Combined forms of age-related cataract of both eyes 2020   • Malignant mesothelioma of pleura (HCC) 2019   • S/P ablation of atrial flutter 01/10/2019   • Statin intolerance 01/10/2019   • Primary insomnia 2019   • Health care maintenance 2019   • Osteoarthrosis 2018   • Tobacco  "abuse disorder 10/18/2011   • Vitamin D deficiency 06/04/2010   • Displacement of lumbar intervertebral disc without myelopathy    • Essential hypertension    • Mixed hyperlipidemia    • Esophageal reflux    • Disturbance in sleep behavior    • Palpitations    • Disc disease with myelopathy, lumbar        Current Outpatient Medications   Medication Sig Dispense Refill   • metoprolol tartrate (LOPRESSOR) 25 MG Tab Take 1 Tablet by mouth at bedtime. 90 Tablet 3   • fluoxetine (PROZAC) 40 MG capsule Take 1 Capsule by mouth every day. 90 Capsule 3   • zolpidem (AMBIEN) 10 MG Tab Take 1 Tablet by mouth at bedtime for 90 days. 90 Each 0   • albuterol 108 (90 Base) MCG/ACT Aero Soln inhalation aerosol      • D3-50 1.25 MG (59090 UT) Cap      • levothyroxine (SYNTHROID) 88 MCG Tab Take 1 Tablet by mouth every morning on an empty stomach. 90 Tablet 3   • lisinopril (PRINIVIL) 5 MG Tab Take 1 Tablet by mouth every day. 90 Tablet 3   • Naloxone (NARCAN) 4 MG/0.1ML Liquid Indications: Opioid Overdose. Instruct patient in Emergency Opioid Overdose Usage.     • tamsulosin (FLOMAX) 0.4 MG capsule Take 2 Caps by mouth every bedtime. 180 Cap 3   • magnesium oxide (MAG-OX) 400 MG Tab Take 400 mg by mouth every day.     • Calcium-Magnesium-Vitamin D (CALCIUM 1200+D3 PO) Take 1 Cap by mouth 2 Times a Day.     • oxyCODONE immediate release (ROXICODONE) 10 MG immediate release tablet        No current facility-administered medications for this visit.         Allergies as of 04/08/2022 - Reviewed 04/08/2022   Allergen Reaction Noted   • Atorvastatin  01/10/2019   • Hmg-coa-r inhibitors Unspecified 01/18/2021            /58 (BP Location: Right arm, Patient Position: Sitting, BP Cuff Size: Adult)   Pulse (!) 46   Temp 35.9 °C (96.7 °F) (Temporal)   Resp 16   Ht 1.753 m (5' 9\")   Wt 73.4 kg (161 lb 12.8 oz)   SpO2 94%   BMI 23.89 kg/m²     Physical Exam:  Gen:         Alert and oriented, No apparent distress.  Neck:        No " Lymphadenopathy or Bruits.  Lungs:     Clear to auscultation bilaterally  CV:          Regular rate and rhythm. No murmurs, rubs or gallops.               Ext:          No clubbing, cyanosis, edema.    CT: 2/2/2022: Results reviewed and questions answered in their entirety.    Assessment and Plan.   67 y.o. male with the following issues:    Malignant mesothelioma of pleura (HCC)  This is a chronic health problem for this patient for which he was unable to tolerate chemotherapy.  He continues to progress in his disease.  He did an CT scan on 2/2/2022 and we reviewed those results that does show significant progression of disease on his pleural-based masses particularly on the left along with mediastinal lymphadenopathy compatible with metastatic disease.  The patient's wife informs me that they have gone back on fluoxetine 40 mg daily.  He finds that this has helped his pain and some of his anxiety over hypoxemia.  We will have him continue long-term.    Hypoxia  This is a chronic problem intermittantly the patient will have problems with hypoxemia secondary to his mesothelioma.  He is utilizing an oxygen concentrator at 2 L when he has this go on.  He does check his O2 sat regularly throughout the day depending on what he is doing.  He attempts to maintain an O2 sat greater than 90 at all times.    Essential hypertension  Is a chronic health problem for this patient that we may have him on too much blood pressure medication.  His pain is under much better control so he does not need as much blood pressure control.  He is on metoprolol 25 mg twice daily currently and I am going to lower that to only at bedtime and we will continue lisinopril 5 mg daily.  After he has been on that new dosing for 7 days we should see his blood pressure above 55, today it is 48.  If it is not above 55 he is going to text me and we will remove the other dose of metoprolol.    Cancer associated pain  This is a chronic health problem for  which the patient has finally been able to get a pain stimulator.  He states that his pain in his back for the first time in his life has gone from being 8/10-0/10.  He is hoping to be able to just utilize this for the rest of his life.    Primary insomnia  This is a chronic health problem for this patient that we have been unable to control any other way than zolpidem.  If this patient does not have zolpidem he cannot sleep more than 1-2 hours.  With the zolpidem he is actually getting up to 7 hours of sleep which is made a real difference in how his days ago.  His  is appropriate and we will refill his medication.

## 2022-04-08 NOTE — ASSESSMENT & PLAN NOTE
This is a chronic health problem for this patient that we have been unable to control any other way than zolpidem.  If this patient does not have zolpidem he cannot sleep more than 1-2 hours.  With the zolpidem he is actually getting up to 7 hours of sleep which is made a real difference in how his days ago.  His  is appropriate and we will refill his medication.

## 2022-04-08 NOTE — ASSESSMENT & PLAN NOTE
This is a chronic health problem for this patient for which he was unable to tolerate chemotherapy.  He continues to progress in his disease.  He did an CT scan on 2/2/2022 and we reviewed those results that does show significant progression of disease on his pleural-based masses particularly on the left along with mediastinal lymphadenopathy compatible with metastatic disease.  The patient's wife informs me that they have gone back on fluoxetine 40 mg daily.  He finds that this has helped his pain and some of his anxiety over hypoxemia.  We will have him continue long-term.

## 2022-04-08 NOTE — ASSESSMENT & PLAN NOTE
This is a chronic health problem for which the patient has finally been able to get a pain stimulator.  He states that his pain in his back for the first time in his life has gone from being 8/10-0/10.  He is hoping to be able to just utilize this for the rest of his life.

## 2022-04-08 NOTE — ASSESSMENT & PLAN NOTE
Is a chronic health problem for this patient that we may have him on too much blood pressure medication.  His pain is under much better control so he does not need as much blood pressure control.  He is on metoprolol 25 mg twice daily currently and I am going to lower that to only at bedtime and we will continue lisinopril 5 mg daily.  After he has been on that new dosing for 7 days we should see his blood pressure above 55, today it is 48.  If it is not above 55 he is going to text me and we will remove the other dose of metoprolol.

## 2022-04-08 NOTE — ASSESSMENT & PLAN NOTE
This is a chronic problem intermittantly the patient will have problems with hypoxemia secondary to his mesothelioma.  He is utilizing an oxygen concentrator at 2 L when he has this go on.  He does check his O2 sat regularly throughout the day depending on what he is doing.  He attempts to maintain an O2 sat greater than 90 at all times.

## 2022-07-14 NOTE — PROGRESS NOTES
Subjective:   Marcial Morrison is a 67 y.o. male who presents for No chief complaint on file.      HPI:  Pleasant 67-year-old male presents to clinic for 1 week of an itchy red rash on his left middle back.  He states that he thinks he might have been bit by a spider but never saw an insect that bit him.  He has never had a rash like this in the past.  He states that he has noticed that with the scabbing of the rash has a days of gone on.  He denies fever, chills, diaphoresis, sore throat, eye discharge, eye redness, chest pain, palpitations, lower leg swelling, rash anywhere else on the body, cough, shortness of breath, wheezing, nausea, vomiting, abdominal pain, dizziness, headache, purulent discharge from the rash, laceration, break in the skin, or bleeding.      Medications:    • fluoxetine  • levothyroxine Tabs  • lisinopril Tabs  • oxyCODONE immediate release  • valacyclovir Tabs  • zolpidem Tabs    Allergies: Atorvastatin and Hmg-coa-r inhibitors    Problem List: Marcial Morrison does not have any pertinent problems on file.    Surgical History:  Past Surgical History:   Procedure Laterality Date   • FUSION, SPINE, LUMBAR, PLIF  10/18/2011    Performed by SHAWNA DALTON at SUR[facility]   • LUMBAR DECOMPRESSION  10/18/2011    Performed by SHAWNA DALTON at [facility]   • HARDWARE REMOVAL NEURO  10/18/2011    Performed by SHAWNA DALTON at [facility]   • FUSION, SPINE, LUMBAR, PLIF  9/15/2010    Performed by SHAWNA DALTON at [facility]   • LAMINOTOMY     • LUNG BIOPSY OPEN     • ROTATOR CUFF REPAIR         Past Social Hx: Marcial Morrison  reports that he has never smoked. His smokeless tobacco use includes chew. He reports that he does not drink alcohol and does not use drugs.     Past Family Hx:  Marcial Morrison family history includes Arthritis in his mother; Cancer in his father; Diabetes in his father; Heart Disease in his father and mother;  "Hypertension in his father and mother.     Problem list, medications, and allergies reviewed by myself today in Epic.     Objective:     /84   Pulse 78   Temp 36.8 °C (98.2 °F) (Temporal)   Resp 12   Ht 1.753 m (5' 9\")   Wt 72.1 kg (159 lb)   SpO2 93%   BMI 23.48 kg/m²     Physical Exam  Vitals reviewed.   Constitutional:       General: He is not in acute distress.     Appearance: Normal appearance.   Cardiovascular:      Rate and Rhythm: Normal rate and regular rhythm.      Pulses: Normal pulses.      Heart sounds: Normal heart sounds. No murmur heard.  Pulmonary:      Effort: Pulmonary effort is normal. No respiratory distress.      Breath sounds: Normal breath sounds. No stridor. No wheezing, rhonchi or rales.   Musculoskeletal:        Back:       Comments: 3 areas of grouped fluid-filled vesicles on erythematous base along the T4 dermatome of the left back.  No ecchymosis, laceration, bleeding, purulent drainage, swelling, or signs of infection.   Skin:     General: Skin is warm and dry.   Neurological:      General: No focal deficit present.      Mental Status: He is alert and oriented to person, place, and time.         Assessment/Plan:     Diagnosis and associated orders:     1. Herpes zoster without complication  valacyclovir (VALTREX) 1 GM Tab      Comments/MDM:     • Patient's presentation physical exam findings are consistent with shingles rash.  He does have several fluid-filled vesicles indicating current replication of the virus.  Patient's symptoms have been going on for approximately 1 week and initially thought it was due to a spider bite.  He states it is a little bit itchy but he does not report significant pain.  • Prescription for valacyclovir was given.  Patient was educated on use this medication and the possible side effects including allergic reaction.  Patient has good understanding of this.  • Patient was educated on the way that this is spread and that is contagious to other " people.  He should keep area covered until the rash resolves.  • ED/return precautions were given.  Patient has good understanding of signs and symptoms that warrant immediate reevaluation.         Differential diagnosis, natural history, supportive care, and indications for immediate follow-up discussed.    Advised the patient to follow-up with the primary care physician for recheck, reevaluation, and consideration of further management.    Please note that this dictation was created using voice recognition software. I have made a reasonable attempt to correct obvious errors, but I expect that there are errors of grammar and possibly content that I did not discover before finalizing the note.    Electronically signed by Joey Garzon PA-C.

## 2022-07-22 NOTE — PROGRESS NOTES
Telemedicine Visit: Established Patient     This encounter was conducted via Zoom.   Verbal consent was obtained. Patient's identity was verified.  As a means of avoiding spread of COVID-19, this visit is being conducted by Telemedicine.         Subjective:   CC: Diagnoses of Malignant mesothelioma of pleura (HCC), Primary insomnia, and Tobacco abuse disorder were pertinent to this visit.    Marcial Morrison is a 67 y.o. male presenting for evaluation and management of:      Malignant mesothelioma of pleura (HCC)  This is a chronic health problem that continues to progress and the patient continues to have worsening shortness of breath.  He continues to use an oxygen concentrator but it doesn't really help like it use to. He can tell that his tidal volume is decreasing.    Primary insomnia  This is a chronic health problem which has worsened with Mesothelioma progression.  He takes one Ambien nightly and needs his refill. His  is appropriate with his use.    Tobacco abuse disorder  This is a chronic health problem that has shown some improvement because he finds he doesn't desire it as much.  He is happy that he is cutting back.      ROS:     - Constitutional: Positive for fatigue on a daily basis, patient also continues to lose weight secondary to his mesothelioma.     - HEENT:  Negative for headaches, vision changes, hearing changes, ear pain, ear discharge, rhinorrhea, sinus congestion, sore throat, and neck pain.      - Respiratory:As in HPI      - Cardiovascular: Negative for chest pain, palpitations, orthopnea, and bilateral lower extremity edema.     - Gastrointestinal: Negative for heartburn, nausea, vomiting, abdominal pain, hematochezia, melena, diarrhea, constipation, and greasy/foul-smelling stools.     - Genitourinary: Negative for dysuria, polyuria, hematuria, pyuria, urinary urgency, and urinary incontinence.     - Musculoskeletal: Negative for myalgias, back pain, and joint pain.     - Skin:  Patient is currently recovering from shingles on his left mid back.     - Neurological: Negative for dizziness, tingling, tremors, focal sensory deficit, focal weakness and headaches.     - Endo/Heme/Allergies: Does not bruise/bleed easily.     - Psychiatric/Behavioral: Negative for depression, suicidal/homicidal ideation and memory loss.          - NOTE: All other systems reviewed and are negative, except as in HPI.      Patient Active Problem List    Diagnosis Date Noted   • Unintended weight loss 01/07/2022   • Acquired hypothyroidism 01/07/2022   • Chronic low back pain 01/18/2021   • Chronic, continuous use of opioids 01/18/2021   • Lumbar post-laminectomy syndrome 01/18/2021   • Cancer associated pain 11/19/2020   • Hypoxia 10/22/2020   • Combined forms of age-related cataract of both eyes 02/04/2020   • Malignant mesothelioma of pleura (HCC) 03/11/2019   • S/P ablation of atrial flutter 01/10/2019   • Statin intolerance 01/10/2019   • Primary insomnia 01/03/2019   • Health care maintenance 01/03/2019   • Osteoarthrosis 02/23/2018   • Tobacco abuse disorder 10/18/2011   • Vitamin D deficiency 06/04/2010   • Displacement of lumbar intervertebral disc without myelopathy    • Essential hypertension    • Mixed hyperlipidemia    • Esophageal reflux    • Disturbance in sleep behavior    • Palpitations    • Disc disease with myelopathy, lumbar       Allergies:Atorvastatin and Hmg-coa-r inhibitors    Current Outpatient Medications   Medication Sig Dispense Refill   • zolpidem (AMBIEN) 10 MG Tab Take 1 Tablet by mouth at bedtime for 90 days. 90 Each 0   • fluoxetine (PROZAC) 40 MG capsule Take 1 Capsule by mouth every day. 90 Capsule 3   • oxyCODONE immediate release (ROXICODONE) 10 MG immediate release tablet      • levothyroxine (SYNTHROID) 88 MCG Tab Take 1 Tablet by mouth every morning on an empty stomach. 90 Tablet 3   • lisinopril (PRINIVIL) 5 MG Tab Take 1 Tablet by mouth every day. 90 Tablet 3     No current  "facility-administered medications for this visit.       Social History     Tobacco Use   • Smoking status: Never Smoker   • Smokeless tobacco: Current User     Types: Chew   • Tobacco comment: 1 can every 2 days for 30 years (2009)   Substance Use Topics   • Alcohol use: No   • Drug use: No     Social History     Social History Narrative   • Not on file       Family History   Problem Relation Age of Onset   • Hypertension Mother    • Arthritis Mother    • Heart Disease Mother         atrial fibrillation   • Diabetes Father    • Cancer Father         PROSTATE CA   • Heart Disease Father         CAD   • Hypertension Father           Objective:   Vitals obtained by patient:    /78 (BP Location: Left arm)   Pulse 83   Ht 1.753 m (5' 9\")   Wt 72.1 kg (159 lb)   Body mass index is 23.48 kg/m².    Physical Exam:  Constitutional: Alert, no distress, well-groomed.  Skin: No rashes in visible areas.  Eye: Round. Conjunctiva clear, lids normal. No icterus.   ENMT: Lips pink without lesions, good dentition, moist mucous membranes. Phonation normal.  Neck: No masses, no thyromegaly. Moves freely without pain.  CV: Pulse as reported by patient  Respiratory: Unlabored respiratory effort, no cough or audible wheeze  Psych: Alert and oriented x3, normal affect and mood.       Assessment and Plan:   The following treatment plan was discussed:     1. Malignant mesothelioma of pleura (HCC)  This is a chronic health problem that continues to progress slowly.  Patient is well aware of the terminal nature of this illness.    2. Primary insomnia  Chronic health problem adequately controlled with Ambien.  Refill provided today.  - zolpidem (AMBIEN) 10 MG Tab; Take 1 Tablet by mouth at bedtime for 90 days.  Dispense: 90 Each; Refill: 0    3. Tobacco abuse disorder  Chronic health problem that the patient had not realized how little that he is chewing.  He used to chew up nearly I can in a day to a day and a half now a can is lasting " a week.  I have encouraged him to continue to cut back on his.            Follow-up: 10/21/22 for an in person visit without labs

## 2022-07-22 NOTE — ASSESSMENT & PLAN NOTE
This is a chronic health problem which has worsened with Mesothelioma progression.  He takes one Ambien nightly and needs his refill. His  is appropriate with his use.

## 2022-07-22 NOTE — ASSESSMENT & PLAN NOTE
This is a chronic health problem that continues to progress and the patient continues to have worsening shortness of breath.  He continues to use an oxygen concentrator but it doesn't really help like it use to. He can tell that his tidal volume is decreasing.

## 2022-07-22 NOTE — ASSESSMENT & PLAN NOTE
This is a chronic health problem that has shown some improvement because he finds he doesn't desire it as much.  He is happy that he is cutting back.

## 2022-09-28 NOTE — PROGRESS NOTES
Preprocedure assessment completed.  Pertinent health information reviewed and communicated to physician and staff prior to time out.  Patient positioned preprocedure by RN, CSTand XRAY.  Foam wedge under ankles for support.

## 2022-09-28 NOTE — H&P
"CC  Low Back, Left Lower Abdomen  HPI  Patient presents for initial evaluation and ongoing management. Symptom duration: 3 month(s) and 3 year(s). Symptom onset: during illness related. Symptoms are currently 8/10 in severity.    Patient reports the presence/absence of the following Hx/sx related to the current visit: also reports \"pins and needles\", weakness. Pain quality is described as sharp, cramping, dull, aching, and shooting. Pain is nearly constant (60% - 95% of the time). There is no time when the pain is worse.    Patient reports exacerbating factors: urination and alleviating factors: medications. Moderate relief from surgery and chiropractic treatment.    Lifestyle/Environment factors include (+) Activities avoided or limited due to pain include going to work, doing yard work or shopping, socializing with friends and family, participating in recreation, exercise, and walking. Pain makes it difficult to stay asleep.    Patient is here to f/u with their pain complaint and medication refill. Otherwise state of health is unchanged and stable. VAS 1/10 currently, worse at 7/10 VAS. Patient notes worse pain radiating from his left abdomen/chest to his back in a ~T6 T7 dermatome. He is interested in scheduling an injection for this, but is very anxious about the procedure.   PMHx  HTN  Cancer - Mesothelioma  Thyroid disease  Arthritis  PSHx  Lumbar Surgery  FHx  mother: Unknown, +Arthritis, +Asthma, +Diabetes, +Heart Attack, +Other CA  Soc Hx  Tobacco: Never smoker  Alcohol: Do not drink  Drug Abuse: No illicit drug use  Others: Living with spouse /  / Number of children - 2 / employment full time  Hospitalizations  Implantable Devices  TOBI Unknown; Active - HF10 SCS 10/22/21  ROS  Constitutional: (+)night sweats, (+)fatigue, (+)weight loss  Cardiovascular: (+)palpitations  Gastrointestinal: (+)abdominal pain  Musculoskeletal: (+)muscle pain, (+)back pain, (+)decreased muscle strength  Neurological: " "(+)weakness  Psychiatric: (+)depression, (+)anxiety, (+)sleep disturbance  Medications  oxyCODONE 10 mg tablet, Take 1 tab po 5x daily prn pain x30 days. ICD10: G89.3 (Edited by Marisela Mercado on 19 Jul, 2022 at 12:00 PM )  lisinopriL 5 mg tablet (Edited by Krystal Flores on 06 Jul, 2021 at 03:32 PM )  tamsulosin 0.4 mg capsule (Edited by Krystal Flores on 06 Jul, 2021 at 03:32 PM )  levothyroxine 88 mcg tablet (Edited by Krystal Flores on 06 Jul, 2021 at 03:32 PM )  FLUoxetine 40 mg capsule (Edited by Krystal Flores on 06 Jul, 2021 at 03:31 PM )  zolpidem 5 mg tablet (Edited by Krystal Flores on 06 Jul, 2021 at 03:31 PM )  Oxycodone (Edited by Krystal Flores on 06 Jul, 2021 at 03:31 PM )  Allergies  statin (Unknown)    Mental/Functional  The patient's speech was normal, sharing conversation with normal laryngeal efforts. Appropriate mood and affect were seen on exam. Thought processes were logical, relevant, and thoughts were completed normally. Thought content was normal. Thought content was normal with no psychotic or suicidal thoughts. The patient's judgement was realistic with normal insight into their present condition. Mental status included: correct time, place, person orientation, normal recent and remote memory, normal attention span and concentration ability. Language skills included the ability to correctly name objects. Fund of knowledge included normal awareness of current and past events.  Vitals  16 Aug 2022 - 02:44 PM - recorded by Ifeoma Pinon    BP:  118.0 / 68.0  HR:  77.0 bpm  Temp:  98.0 °F  Ht/Lt:  5' 9\"  Wt:  150 lbs 0 oz  BMI:  22.15  SpO2:  90.0%  EXAM  GEN: NAD, alert  PSYCH: cooperative, pleasant  HEENT: head: non-tender, atraumatic, normocephalic,   eyes: EOMI, PERRL, ENT: atraumatic, oropharynx clear  NECK: non-tender, supple  DERM: dry, good turgor, color and capillary refill  EXT: atraumatic, full ROM, equal pulses x4    NEURO:  Mental " status: alert, oriented x 3, CN intact,   Gait: gait normal, Toe an Heel walking are normal   Balance: Normal Tandem Gait, Romberg test is normal  Strength: All Major muscle groups of the bilateral upper and lower extremities have normal and symmetric strength, bulk and tone,  Coordination: Coordination is normal in the upper and lower extremities, including heel to shin, finger-nose-finger and rapid hand movements.  Reflexes: Bilateral upper and lower extremity deep tendon reflexes  are normal and symmetric, plantar responses are down-going bilaterally, De Leon's negative, NO clonus on rapid dorsi-flexion bilaterally   Sensation: Normal pinprick and light touch sensation throughout the upper and lower extremities.  SLR: Straight raise leg is negative for radicular pain bilaterally    Musculoskeletal:   Spine: No gross axial skeletal deformities. Full range of motion with flexion and extension  Palpation: Inspection and palpation of the spine and upper/lower extremities are unremarkable   Joint ROM: joint range of motion is full and pain free without obvious instability or laxity in the major joints of all four extremities, NO gross appendicular deformities    Provocative Maneuvers:   Lumbar: Negative Lumbar quadrant test bilaterally. No radiating pain with lumbar extension  Neck: Spurling's test is negative for radicular pain bilaterally, Lhermitte's sign negative   Shoulder: Negative Hawkin's bilaterally, [place]'s test negative bilaterally.  Hip: Negative CLAUDINE and FADIR tests bilaterally  Knee: No knee effusion, Negative Lachman's, posterior drawer and Mirza's tests. No Pain or laxity on stressing of medial and lateral collateral ligaments. Negative patellar apprehension and normal patellar glide. NO patellar tendon tenderness.      Peripheral Vascular   EXT: (-) cyanosis, (-)clubbing, (-)edema     Skin: Gross inspection of skin is unremarkable for erythema, breakdown or concerning lesions.     Miscellaneous  :   Results  PROCEDURE(s): CAT SCAN - ABDOMEN WITH CONT  EXAM DATE/TIME: 07/19/21 1500  REASON: MESOTHELIOMA OF PLEURA  ORDER NUMBER(s): [id number], ACCESSION NUMBER(s): [id number]  CLINICAL DATA: Mid abdominal pain   TECHNICAL: Contiguous axial scans are obtained from the level of the diaphragm   through the umbilicus after the intravenous administration of contrast 100 cc   of nonionic iohexol 350 were power injected No oral contrast was given. Dose   reduction techniques were used including automated exposure control and   adjustment of mA and/or kV according to patient size.   In the preceding 12 months the patient has had, at this institution, 0 CT   exam(s) and 0 nuclear medicine myocardial perfusion imaging exam(s).   COMPARISON: None   FINDINGS:    Lungs: Lobulated soft tissue mass of the pleura is present measuring up to 30   mm in thickness and consistent with the patient's history of a mesothelioma    Spleen: Normal.    Stomach: Normal.    Liver: Normal.    Gallbladder and bile ducts: No calcified gallstones. Normal wall thickness.   No intra- or extrahepatic bile duct dilatation.    Pancreas: Normal.    Adrenal glands: Normal.    Kidneys and ureters: Normal.    Retroperitoneum: The aorta and inferior vena cava are unremarkable. No   retroperitoneal or mesenteric lymphadenopathy is present.    Peritoneum: No ascites or free air. No other fluid collection.    Gastrointestinal tract: Normal caliber.    Bones: Multiple level decompressive laminectomy and posterolateral pedicle   screw fixation of the lumbar spine   IMPRESSION:   LOBULATED SOFT TISSUE MASS OF THE PLEURAL SURFACE OF THE LEFT LUNG BASE   CONSISTENT WITH THE PATIENT'S HISTORY OF MESOTHELIOMA   MULTIPLE LEVEL DECOMPRESSIVE LAMINECTOMY AND POSTEROLATERAL PEDICLE SCREW   FIXATION OF THE LUMBAR SPINE    EXAMINATION:  CT-CHEST,ABDOMEN,PELVIS WITH [[id number]]  2/20/2020   HISTORY/REASON FOR EXAM: Abdominal distension.  Mesothelioma with new onset  abdominal bloating and early satiety.  TECHNIQUE/EXAM DESCRIPTION:  CT scan of the chest, abdomen and pelvis with contrast.  Thin-section helical scanning was obtained with intravenous contrast from  the lung apices through the pubic symphysis to include the chest, abdomen  and pelvis. 100 mL of Omnipaque 350 nonionic contrast was administered  intravenously without complication.  Low dose optimization technique was utilized for this CT exam including  automated exposure control and adjustment of the mA and/or kV according to  patient size.  COMPARISON: 3/27/2019 PET/CT, 1/29/2019  FINDINGS:  No acute bony abnormality is seen  CT Chest:  Dominant anterior left paramediastinal pleural based mass measures 60 x 29  from 49 x 33 mm on comparison. Some mediastinal extension is likely.  The previously noted mass is in the epicardial fat just lateral to the  left ventricle have considerably improved, previously measured in  aggregate 17 x 33 mm, now essentially completely resolved  Soft tissue anterior to the cardiac apex while difficult to measure in  long axis, has increased in short axis measuring 5 from 8 mm short axis  Left pleural fluid has diminished now measuring 10 from 17 mm short axis.  Pleural thickening especially posteriorly has greatly improved  No enlarged left hilar node is detected, one node measures 9 mm similar to  comparison  No right pleural space abnormality is seen and no right hilar adenopathy  is detected.  There is a borderline enlarged right precarinal node  No calcified pleural plaque is detected  No hematogenous pulmonary metastasis is suggested  CT Abdomen:  No ascites  No free air  No peritoneal or other metastatic disease is seen. There is no  lymphadenopathy, adrenal mass or splenomegaly  Normal enhancement of the solid organs  Normal configuration of the bowel. No dilatation, abnormal stool volume or  inflammatory change  Mild atherosclerosis. No aneurysm  Some artifact related to spinal  fixation hardware. 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 The distal right ureter and/or ureters are normal in caliber.  It most likely that the ureters join at  the mid aspect. No renal scarring/atrophy.  CT Pelvis:  No free fluid  No adenopathy.  Fatty right inguinal hernia  Normal configuration of the bladder but the prostate gland is moderately  enlarged and the seminal vesicles are prominent but no inflammatory fat  stranding is seen  IMPRESSION:  Interval improvement in left pleural fluid, nodular pleural space  thickening from mesothelioma as detailed above  Interval enlargement of the dominant mass adjacent to the lingula but  every other mass throughout the left chest is stable to improved. This  suggests a mostly positive response to therapy  No acute abdominopelvic abnormality is seen  Fatty right indirect inguinal hernia  Moderate prostate enlargement and the seminal vesicles are enlarged.       EXAMINATION:  DX-LUMBAR SPINE-2 OR 3 VIEWS  11/12/2019  FINDINGS:  The alignment of the lumbar spine is normal.  Posterior fusion hardware consisting of bilateral pedicular screws, paired  posterior rods, and one transverse bar is present extending from L2  through L4.  Bilateral laminectomy defects are present from L3 through S1.  Bilateral posterior bony graft material is present extending from L2-3  through L5-S1.  The vertebral body heights are maintained.  There is narrowing and partial fusion of the L3-4 through L5-S1 discs.  Metallic markers are present within the disc spaces from L2-3 through  L5-S1.  There is no significant facet arthropathy.  There is no acute abnormality.  There is symmetric degenerative change of each SI joint.  IMPRESSION:  1. No lumbar compression fracture or subluxation.  2. Posterior fusion hardware in place from L2 through  L4.  3. Discectomy changes from L2-3 through L5-S1.  4. Bilateral bone graft material extending from L2-3 through L5-S1.  5. Bilateral laminectomy defects L3-S1.    Impression  1. Malignant mesothelioma  2. Lumbar spondylosis  3. Failed back syndrome/post-laminectomy syndrome    Assessment  Marcial is a pleasant 65 yo male who presents to our clinic for the above complaints. Patient has history of low back pain and malignant mesothelioma. He has history of lumbar surgeries about 10 years ago by Dr. Moss. His low back pain returned in April 2020. His low back pain is described as a constant ache with sharp shooting pain down his posterior legs to his ankles, right worse than left. He reports weakness in legs. His pain is worse with standing and activity. It is hard to differentiate with his mesothelioma. Denies numbness and tingling. He stated it feels like it did before the lumbar surgeries. He has done lumbar epidurals in the past before his surgeries with temporary relief. He is also having left lower abdominal pain described as pressure with cramping. It started in April 2020. Stretching his abdomen will make it worse. He does have a mass in his left chest. He believes the pain could be from the mass. Currently taking fentanyl patch 12 mcg and oxycodone 10 mg q6h with mild relief. He has tried morphine, tramadol, gabapentin and hydrocodone. He has done physical therapy in the past for his lower back in the past with benefit. Patient has exhausted all conservative treatments including home exercises for more than 6 weeks in the past 6 months, NSAIDs, OPIOID, Neuropathic medication such as gabapentin over three months thus the patient is a good candidate for the interventional procedure. We discussed moving forward with b/l L3-S1 FJNB's for his low back pain but he has a fear of needles so he will think about. Patient was given an informative handout. We also discussed a hypogastric plexus block for his abdominal  pain and he will think about this as well. I will order a CT of his abdomen. For his pain medication, I will take over the fentanyl and oxycodone. We will discuss pain pump for intrathecal pain therapy. He continues to report low back pain. He is scheduled for CT scan on Monday 06/19/21. I think the patient would benefit from  b/l L3-S1 facet joint nerve blocks with plan to proceed with ablations if we have successful blocks. We went over the IQ diagnostic result with the patient today in detail. We discussed the strong points and weak points and answered the patient's questions. We will provide the patient the resources to read and learn. The patient may need pain psychology referral.  Patient notes N/V and abdominal pain with fentanyl patch and would like to discontinue. Given his symptoms, we will discontinue Fentanyl and start Butrans patches. Continue Oxycodone for breakthrough pain. Patient may be an ideal candidate for HF10 lumbar TSCS. Patient agrees to the plan and would like to proceed. Patient does not c/o any side effects from their treatment regimen. Patient underwent SCS Trial on 08/19/21 with over 80% improvement in pain and function. Patient was very happy with the results and wishes to proceed towards implant. Given his results with trial SCS, I believe he would benefit from PSCS HF10. Patient agrees to this plan. Plan to decrease Oxycodone use after SCS implant. Patient agrees to this.  Patient does not c/o any side effects from their treatment regimen. Patient underwent SCS Trial on 08/19/21 with over 80% improvement in pain and function. Patient was very happy with the results and wishes to proceed towards implant. He is scheduled for PSCS on 10/22/21.  Patient underwent permanent SCS HF10 on 10/22/21. Staples removed today and device activated. Plan to decrease Butrans patch back to 5mcg/hr. Patient agrees to this.  Patient is doing very well with the PSCS HF10. He is wanting to discontinue pain  medication. We will discontinue Butrans patch first, patient will slowly wean off oxycodone over the next month as tolerated. Plan to decrease oxycodone next visit. Patient is doing very well with the PSCS HF10. He is wanting to discontinue pain medication. He is scheduled for a Cat scan to determine tumor growth. Patient was told he may only have about 2 years left, and this has left him quite depressed. He has been doing well with taking Oxycodone 5mg 6/day. Given this. we will continue his current rx, but notes this will last him closer to 6-8 weeks. Thus this will allow him and his wife to travel more in their RV while he can. Patient is doing very well with the PSCS HF10. He is wanting to discontinue pain medication. He is scheduled for a Cat scan to determine tumor growth. Patient was told he may only have about 2 years left, and this has left him quite depressed. He has been doing well with taking Oxycodone 5mg 6/day. He has pain over the left lower lung pain from his mesothelioma.  Given this, he may benefit from a left T10-11 ILESI. Patient requests this to be done with sedation. Patient is doing very well with the PSCS HF10. He is wanting to discontinue pain medication. He initially had pain over the left lower lung pain from his mesothelioma. Patient notes this has not bothered him since. He has since had very little to no pain. We will continue to manage conservatively. 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 Patient has had very little to no pain. Patient does not c/o any side effects from their treatment regimen. He has discontinued Metoprolol and this has helped with his energy level. Patient notes that the stabbing pain in random areas of his body are getting closer together. He also notes his lung mesothelioma tumor is hurting more. Patient will be starting palliative care and wants to go to [facility]. Patient notes he has been taking more of his  oxycodone. He continues to have fatigue which is worsening. He notes his fatigue causes muscle weakness. He saw a chiropractor in [place] to help with his rib and back pain who specializes in gentle manipulation. Patient does not c/o any side effects from their treatment regimen.  Since last visit, patient has had shingles on his back around T6 dermatone on the left. He has been on valacyclovir medication for this and will meet with his PCP on Friday 7/22/22. His symptoms are currently under control. Patient does not c/o any side effects from their treatment regimen. 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       Today, patient is here to f/u with their pain complaint and medication refill. Otherwise state of health is unchanged and stable. VAS 1/10 currently, worse at 7/10 VAS. Patient notes worse pain radiating from his left abdomen/chest to his back in a ~T6 T7 dermatome. He is interested in scheduling an injection for this, but is very anxious about the procedure. Given this, he may benefit from left T5, T6 T7 TFESI (exact levels determined under flouro). Patient will also add in Tylenol to his regimen. Provided patient with 2nd month rx as he has been stable on his dose of medications.   Plan  1- Preliminary UDS 7/6/21 consistent   2- We went over the IQ diagnostic result with the patient today in detail. We discussed the strong points and weak points and answered the patient's questions. We will provide the patient the resources to read and learn. The patient may need pain psychology referral. 7/19/22  3- BOP reviewed  4- The patient continues to do their home exercise program. The patient was instructed to continue this program.  5- CT of abdomen review at f/u  6- Consider hypogastric plexus block  7- I think the patient's complaints would benefit from b/l L3-S1 facet joint nerve blocks with plan to proceed with ablations if we have successful blocks. I have explained the procedure  to the patient including the risks and benefits. The patient was consented and a consent form was signed today. Patient education materials regarding the procedure were given to the patient to review and the patient was encouraged to call if they have any further question prior to the procedure. (on hold until CT is reviewed).  8- Discontinue fentanyl 12 mcg patch q72h. Discontinue Butrans to 5mcg/hr  9- Refill oxycodone 10 mg q6h - pt only taking 3/day, but this lasts longer than 1 month DNF 8/17/22, 9/15/22  10- I think the patient's complaints would benefit from Left T5, T6 and T7 TFESI with sedation at surgery center.  I have explained the procedure to the patient including the risks and benefits. The patient was consented and a consent form was signed today. Patient education materials regarding the procedure were given to the patient to review and the patient was encouraged to call if they have any further question prior to the procedure. (on hold until symptoms worsen)  11. Referral to [facility].      MEDICATIONS:  oxyCODONE 10 mg tablet, Take 1 tab po 5x daily prn pain x30 days. ICD10: G89.3 (Prescribed by Marisela Mercado on 11 Jan, 2022 at 11:57 AM)    MEDICATIONS:  oxyCODONE 10 mg tablet, Take 1 tab po 5x daily prn pain x30 days. ICD10: G89.3 (Prescribed by Marisela Mercado on 16 Feb, 2022 at 11:27 AM)    MEDICATIONS:  oxyCODONE 10 mg tablet, Take 1 tab po 5x daily prn pain x30 days. ICD10: G89.3 (Prescribed by Marisela Mercado on 16 Mar, 2022 at 11:57 AM)    MEDICATIONS:  oxyCODONE 10 mg tablet, Take 1 tab po 5x daily prn pain x30 days. ICD10: G89.3 (Prescribed by Marisela Mercado on 12 Apr, 2022 at 11:49 AM)    MEDICATIONS:  oxyCODONE 10 mg tablet, Take 1 tab po 5x daily prn pain x30 days. ICD10: G89.3 (Prescribed by Marisela Mercado on 12 May, 2022 at 12:06 PM)    MEDICATIONS:  oxyCODONE 10 mg tablet, Take 1 tab po 5x daily prn pain x30 days. ICD10: G89.3 (Prescribed by  Marisela Mercado on 15 David, 2022 at 01:22 PM)    MEDICATIONS:  oxyCODONE 10 mg tablet, Take 1 tab po 5x daily prn pain x30 days. ICD10: G89.3 (Prescribed by Marisela Mercado on 19 Jul, 2022 at 12:00 PM)    MEDICATIONS:  oxyCODONE 10 mg tablet, Take 1 tab po 5x daily prn pain x30 days. ICD10: G89.3 (Prescribed by Marisela Mercado on 16 Aug, 2022 at 03:48 PM)  oxyCODONE 10 mg tablet, Take 1 tab po 5x daily prn pain x30 days. ICD10: G89.3 (Prescribed by Marisela Mercado on 16 Aug, 2022 at 03:50 PM)  Minor Procedures  08/26/21 Lead removal: After obtaining verbal consent from the patient, the patient was placed on the exam table. We proceeded with removing the sutures and the leads were pulled back gently. The tips were visibly intact. The skin sites were cleaned with a sterile wound wipe and steri-strips were applied. Patient tolerated the procedure without any complication and was given detailed wound care instructions.  11/5/21 Procedure: Skin Staple Removal  Location: Left Sacrum  In sterile fashion, skin was cleaned with chlorhexadine, all staples were removed using sterile skin staple remover. Sterile Steri-strips were applied. Patient tolerated procedure well.  Goals  Baseline UDS 7/6/21 consistent   UDS 8/11/21 was consistent  Health Concerns  Long Term Opioid Therapy  [facility] ,   1- I have made a good britt effort to review previous and current medical records   2- A physical exam and psychological assessment have been performed, including the patient's mental health, risk abuse, addiction and dependency   3- A current treatment plan is available in the patient's chart  4- Alternative treatment options have been offered and discussed with the patient   5- The potential risks and benefits have been discussed with the patient which include:      -The potential risk of dependency, addiction and overdose      -The risks of these medications to a fetus in a child bearing age women  6- Singed informed  consent has been attained from the patient  7- The patient has signed an updated opioid prescription medication agreement. The agreement covers:      -Goals of treatments      -A requirement that the patient take the medications as prescribed      -How we will address prescription refills      -The proper use, storage and disposal of opioid medications      -Patient's consent to urine/saliva drug testing to ensure compliance with taking the medication as prescribed      -A requirement that the patient inform the practitioner of any new controlled substance being prescribed or taken by the patient      -A prohibition on the use of alcohol, cannabinoid (without a medical marijuana license) or illicit drugs      -A prohibition on sharing the medication with any other person       -A prohibition from misusing, abusing or diverting the medications  8- Patient has been evaluated and treated for side effects or complications relating to the use of the medication  9- An updated  report was reviewed prior prescribing the medication to the patient  10- Patient was advised to avoid the use of benzodiazepine and/or other CNS depressants while using pain medications because the interaction of these medications can lead to respiratory depression and death  11- The availability of a nonopioid antagonist without a prescription

## 2022-09-28 NOTE — PROGRESS NOTES
Dr. Jin in to see patient.  Questions answered.  Discharge instructions given with pt understanding.    Patient taking fluids well, no nausea.  Discharge orders received.  Steady gait.

## 2022-09-28 NOTE — OP REPORT
Procedure  Left T5-T6 FERNANDO  Performed By  Rosalio Jin MD   Indication  Thoracic Radiculopathy   Comments  ATTENDING: Rosalio Jin MD    ASSISTANT: None     PREOPERATIVE DIAGNOSIS: Thoracic radiculopathy.    POSTOPERATIVE DIAGNOSIS: Thoracic  radiculopathy.    PROCEDURE PERFORMED:  1. T6-T7 paramedian epidural steroid injection on the left side.  2. Fluoroscopy for precise needle placement.    ANESTHESIA: Local infiltration with lidocaine with conscious sedation   Anesthesia time: 14 minutes     MONITORS: Automatic blood pressure cuff, pulse oximetry    INDICATIONS: Thoracic radiculopathy    MEDICATIONS: Please see the chart  (Please note that the patient’s anticoagulant and/or aspirin were held appropriately)      REVIEW OF SYSTEMS: Negative for fever, chills, chest pain, SOB, bleeding abnormalities, nausea, vomiting, diarrhea, worsening edema, or new rashes.     FOCUSED PHYSICAL EXAMINATION: The patient is awake, alert and oriented, and is in no acute distress. Vital signs are stable. The patient is afebrile. The rest of the PE is essentially unchanged from the patient’s recent visit to our office.    We explained the procedure to the patient including the risks, benefits and alternatives to the procedure. The patient verbalized understanding and was willing to proceed.    PROCEDURE IN DETAIL: An informed consent was obtained. The patient was taken to the procedure room and was positively identified by the staff and the attending physician. The patient was positioned prone on the procedure bed. Vital signs were monitored as above and remained stable throughout the procedure. A fluoroscopic view of the lumbar spine was obtained, and the area of interest was identified. The skin was prepped and draped in the standard sterile fashion. A surgical pause (time-out) was performed and was agreed upon by the members of the team. The skin and subcutaneous tissues were anesthetized using 1% lidocaine and 25-gauge 1-1/2-inch  needle.   After that, a 20-gauge 3.5 inch epidural needle was advanced onto the T7 lamina in the left paramedian position. Once the needle contacted the bone, it was advanced cephalad into the T6-T7 interspace. Loss of resistance to air technique was utilized and was obtained at 6.5 cm from the skin. The needle’s position was additionally verified by injecting radiopaque dye, which showed excellent spread of the dye in the epidural space. After negative aspiration for the CSF or blood, 80 mg of Kenalog  diluted in 1 mL of preservative-free normal saline and 2 ml of bupivacaine 0.25% were injected into the epidural space. The needle was withdrawn. The patient tolerated the procedure well.    COMPLICATIONS: None.    PAIN SCORE BEFORE THE PROCEDURE: [TAB]    PAIN SCORE AFTER THE PROCEDURE: [TAB]    DISPOSITION:  1. Discharge to home when the discharge criteria are met.  2. Return to the Pain Clinic in 4-6 weeks.  3. (The patient will resume their medication)  4. (The patient will monitor their blood glucose over the next week as instructed and was advised to contact us if the reading is greater than 250mg/dL or if they develop any signs or symptoms of hyperglycemia.

## 2022-09-28 NOTE — OP REPORT
This is an addendum to the note procedure note , The procedure was T5-T6 and needle was placed on T6 lamina and walked off  to T5-T6 FERNANOD.

## 2022-10-21 NOTE — ASSESSMENT & PLAN NOTE
This is a chronic problem that is continuing to progress and grow.  His largest lesion is on the left and he is starting to have more and more nerve pain and working with Dr. Casas on appropriate treatment.

## 2022-10-21 NOTE — ASSESSMENT & PLAN NOTE
This is a chronic health problem for this patient who continues to lose weight despite trying to eat adequate numbers of calories.  His problems are all directly related to his mesothelioma.

## 2022-10-21 NOTE — ASSESSMENT & PLAN NOTE
This is a chronic health problem that is now well controlled with current medications.  We have stopped his other meds except for the lisinopril.  His bp now is 112/70.

## 2022-10-24 NOTE — PROGRESS NOTES
CC: Diagnoses of Need for vaccination, Malignant mesothelioma of pleura (HCC), Essential hypertension, Primary insomnia, and Unintended weight loss were pertinent to this visit.                                                                                                                                         HPI:   Marcial presents today with the following concerns:    1. Need for vaccination  Patient received Prevnar 20 and influenza today    2. Malignant mesothelioma of pleura (HCC)  Chronic health problem continuing to progress    3. Essential hypertension  Chronic health problem, well controlled, continue with current meds and lifestyle.      4. Primary insomnia  Chronic problem continuing to require zolpidem which I consider appropriate    5. Unintended weight loss  Chronic problem directly related to his mesothelioma       Patient Active Problem List    Diagnosis Date Noted    Unintended weight loss 01/07/2022    Acquired hypothyroidism 01/07/2022    Chronic low back pain 01/18/2021    Chronic, continuous use of opioids 01/18/2021    Lumbar post-laminectomy syndrome 01/18/2021    Cancer associated pain 11/19/2020    Hypoxia 10/22/2020    Combined forms of age-related cataract of both eyes 02/04/2020    Malignant mesothelioma of pleura (HCC) 03/11/2019    S/P ablation of atrial flutter 01/10/2019    Statin intolerance 01/10/2019    Primary insomnia 01/03/2019    Health care maintenance 01/03/2019    Osteoarthrosis 02/23/2018    Tobacco abuse disorder 10/18/2011    Vitamin D deficiency 06/04/2010    Displacement of lumbar intervertebral disc without myelopathy     Essential hypertension     Mixed hyperlipidemia     Esophageal reflux     Disturbance in sleep behavior     Palpitations     Disc disease with myelopathy, lumbar        Current Outpatient Medications   Medication Sig Dispense Refill    zolpidem (AMBIEN) 10 MG Tab Take 1 Tablet by mouth at bedtime for 90 days. 90 Each 0    fluoxetine (PROZAC) 40  "MG capsule Take 1 Capsule by mouth every day. 90 Capsule 3    oxyCODONE immediate release (ROXICODONE) 10 MG immediate release tablet       levothyroxine (SYNTHROID) 88 MCG Tab Take 1 Tablet by mouth every morning on an empty stomach. 90 Tablet 3    lisinopril (PRINIVIL) 5 MG Tab Take 1 Tablet by mouth every day. 90 Tablet 3     No current facility-administered medications for this visit.         Allergies as of 10/21/2022 - Reviewed 10/21/2022   Allergen Reaction Noted    Atorvastatin  01/10/2019    Hmg-coa-r inhibitors Unspecified 01/18/2021            /70 (BP Location: Right arm, Patient Position: Sitting, BP Cuff Size: Adult)   Pulse 66   Temp 37.1 °C (98.7 °F) (Temporal)   Resp 16   Ht 1.753 m (5' 9\")   Wt 66.7 kg (147 lb)   SpO2 93%   BMI 21.71 kg/m²     Physical Exam:  Gen:         Alert and oriented, No apparent distress.  Neck:        No Lymphadenopathy or Bruits.  Lungs:     Clear to auscultation bilaterally  CV:          Regular rate and rhythm. No murmurs, rubs or gallops.               Ext:          No clubbing, cyanosis, edema.        Assessment and Plan.   67 y.o. male with the following issues:    Malignant mesothelioma of pleura (HCC)  This is a chronic problem that is continuing to progress and grow.  His largest lesion is on the left and he is starting to have more and more nerve pain and working with Dr. Casas on appropriate treatment.    Essential hypertension  This is a chronic health problem that is now well controlled with current medications.  We have stopped his other meds except for the lisinopril.  His bp now is 112/70.    Primary insomnia  This is a     Unintended weight loss  This is a chronic health problem for this patient who continues to lose weight despite trying to eat adequate numbers of calories.  His problems are all directly related to his mesothelioma.       "

## 2022-11-30 NOTE — LETTER
Name:          Marcial oMrrison   YOB: 1955  Date:     01/10/2019      Mega Reed M.D.  [address]  [place] [address]     Minh Taveras MD  [address], [address]  [place] [address]  Phone: [phone]  Back Line: [phone]  Fax: [phone]  E-mail: [email or web address]   Dear Dr. Reed,    We had the pleasure of seeing your patient, Marcial Morrison, in Cardiology Clinic at [facility] today.    As you know, he is a 63-year-old man with a history of typical, clockwise atrial flutter status post ablation in 7/30/2014. He has dyslipidemia, and statin intolerance.    He is doing very well today from a cardiovascular perspective and has no complaints.  There is no clinical evidence of recurrence of his atrial flutter related to which I do not think he has an elevated risk of stroke.    His blood pressure is wonderfully controlled measured in our office at 118/72 mmHg.    I did review with him his fairly elevated cholesterol by serial laboratories both in the [facility] system, and an hours.  He discussed with me his fairly severe side effects with atorvastatin after only 3 days of use.  I ordered a CT coronary calcium scan to adjudicate as to whether or not he does need lipid therapy outside of lifestyle modification.  We discussed at length alternatives including low-dose Crestor, Praluent, or Repatha.  Again, the use of those medications would be predicated on the extent to which we see coronary calcifications with his dyslipidemia.    Finally, he has no significant risk factors for adverse cardiovascular outcome with upcoming planned shoulder arthroplasty.  Our office has sent a clearance to his orthopedic surgeon to whom I will direct a copy of this note as well.    Return in about 6 months (around 7/10/2019).    Thank you for the referral and please do not hesitate to contact me at any time. My contact information is listed above.    This note  -- DO NOT REPLY / DO NOT REPLY ALL --  -- Message is from [facility] ([facility]) --    General Patient Message Please send over History, Physical, labs, ekg. Please fax to [phone]. Surgery is 12/5. Thank you.      Caller Information       Type Contact Phone/Fax    11/30/2022 12:43 PM CST Phone (Incoming) Jeny [phone]     [facility]        Alternative phone number: NA    Can a detailed message be left? Yes    Message Turnaround:     Is it Working Hours? Yes - Working Hours     [place]:    Please give this turnaround time to the caller:   \"This message will be sent to [state Provider's name]. The clinical team will fulfill your request as soon as they review your message.\"                 was dictated using Dragon speech recognition software.     A full note including my physical examination and a full list of rectified medications is available in our medical record, and can be faxed as well.    Minh Taveras MD  Cardiologist  [facility]    cc: Consuelo Li MD

## 2023-01-01 ENCOUNTER — HOSPITAL ENCOUNTER (OUTPATIENT)
Dept: LAB | Facility: MEDICAL CENTER | Age: 68
End: 2023-02-08
Attending: FAMILY MEDICINE
Payer: MEDICARE

## 2023-01-01 ENCOUNTER — OFFICE VISIT (OUTPATIENT)
Dept: INTERNAL MEDICINE | Facility: IMAGING CENTER | Age: 68
End: 2023-01-01
Payer: MEDICARE

## 2023-01-01 ENCOUNTER — PHARMACY VISIT (OUTPATIENT)
Dept: PHARMACY | Facility: MEDICAL CENTER | Age: 68
End: 2023-01-01
Payer: MEDICARE

## 2023-01-01 ENCOUNTER — HOME CARE VISIT (OUTPATIENT)
Dept: HOSPICE | Facility: HOSPICE | Age: 68
End: 2023-01-01
Payer: MEDICARE

## 2023-01-01 ENCOUNTER — PATIENT MESSAGE (OUTPATIENT)
Dept: INTERNAL MEDICINE | Facility: IMAGING CENTER | Age: 68
End: 2023-01-01
Payer: MEDICARE

## 2023-01-01 ENCOUNTER — OFF SITE VISIT (OUTPATIENT)
Dept: PALLIATIVE MEDICINE | Facility: HOSPICE | Age: 68
End: 2023-01-01
Payer: MEDICARE

## 2023-01-01 ENCOUNTER — TELEPHONE (OUTPATIENT)
Dept: PALLIATIVE MEDICINE | Facility: HOSPICE | Age: 68
End: 2023-01-01
Payer: MEDICARE

## 2023-01-01 ENCOUNTER — HOSPITAL ENCOUNTER (OUTPATIENT)
Dept: RADIOLOGY | Facility: MEDICAL CENTER | Age: 68
End: 2023-03-30
Attending: FAMILY MEDICINE
Payer: MEDICARE

## 2023-01-01 ENCOUNTER — TELEPHONE (OUTPATIENT)
Dept: HEALTH INFORMATION MANAGEMENT | Facility: OTHER | Age: 68
End: 2023-01-01

## 2023-01-01 ENCOUNTER — HOSPICE ADMISSION (OUTPATIENT)
Dept: HOSPICE | Facility: HOSPICE | Age: 68
End: 2023-01-01
Payer: MEDICARE

## 2023-01-01 VITALS
WEIGHT: 142 LBS | TEMPERATURE: 97.2 F | BODY MASS INDEX: 21.03 KG/M2 | HEIGHT: 69 IN | OXYGEN SATURATION: 93 % | SYSTOLIC BLOOD PRESSURE: 102 MMHG | HEART RATE: 64 BPM | RESPIRATION RATE: 12 BRPM | DIASTOLIC BLOOD PRESSURE: 58 MMHG

## 2023-01-01 VITALS
HEART RATE: 71 BPM | BODY MASS INDEX: 18.96 KG/M2 | DIASTOLIC BLOOD PRESSURE: 62 MMHG | WEIGHT: 128 LBS | OXYGEN SATURATION: 93 % | HEIGHT: 69 IN | RESPIRATION RATE: 12 BRPM | SYSTOLIC BLOOD PRESSURE: 122 MMHG | TEMPERATURE: 97.7 F

## 2023-01-01 DIAGNOSIS — C45.0 MALIGNANT MESOTHELIOMA OF PLEURA (HCC): ICD-10-CM

## 2023-01-01 DIAGNOSIS — F32.1 CURRENT MODERATE EPISODE OF MAJOR DEPRESSIVE DISORDER WITHOUT PRIOR EPISODE (HCC): ICD-10-CM

## 2023-01-01 DIAGNOSIS — F51.01 PRIMARY INSOMNIA: ICD-10-CM

## 2023-01-01 DIAGNOSIS — G89.3 CANCER ASSOCIATED PAIN: ICD-10-CM

## 2023-01-01 DIAGNOSIS — I10 ESSENTIAL HYPERTENSION: ICD-10-CM

## 2023-01-01 DIAGNOSIS — R74.8 ELEVATED ALKALINE PHOSPHATASE LEVEL: ICD-10-CM

## 2023-01-01 LAB
ALP BONE SERPL-CCNC: 31 U/L (ref 0–55)
ALP ISOS SERPL HS-CCNC: 0 U/L
ALP LIVER SERPL-CCNC: 282 U/L (ref 0–94)
ALP SERPL-CCNC: 313 U/L (ref 40–120)

## 2023-01-01 PROCEDURE — 84075 ASSAY ALKALINE PHOSPHATASE: CPT

## 2023-01-01 PROCEDURE — 99349 HOME/RES VST EST MOD MDM 40: CPT | Mod: 25

## 2023-01-01 PROCEDURE — 36415 COLL VENOUS BLD VENIPUNCTURE: CPT

## 2023-01-01 PROCEDURE — 99497 ADVNCD CARE PLAN 30 MIN: CPT

## 2023-01-01 PROCEDURE — A9552 F18 FDG: HCPCS

## 2023-01-01 PROCEDURE — 99214 OFFICE O/P EST MOD 30 MIN: CPT | Performed by: FAMILY MEDICINE

## 2023-01-01 PROCEDURE — 84080 ASSAY ALKALINE PHOSPHATASES: CPT

## 2023-01-01 PROCEDURE — 99214 OFFICE O/P EST MOD 30 MIN: CPT | Mod: GV | Performed by: FAMILY MEDICINE

## 2023-01-01 PROCEDURE — 99350 HOME/RES VST EST HIGH MDM 60: CPT | Mod: 25

## 2023-01-01 PROCEDURE — RXMED WILLOW AMBULATORY MEDICATION CHARGE

## 2023-01-01 RX ORDER — ONDANSETRON 4 MG/1
4 TABLET, FILM COATED ORAL EVERY 4 HOURS PRN
Qty: 20 TABLET | Refills: 0 | Status: SHIPPED | OUTPATIENT
Start: 2023-01-01 | End: 2023-01-01

## 2023-01-01 RX ORDER — LISINOPRIL 5 MG/1
5 TABLET ORAL DAILY
Qty: 90 TABLET | Refills: 3 | Status: SHIPPED | OUTPATIENT
Start: 2023-01-01 | End: 2023-01-01

## 2023-01-01 RX ORDER — OXYCODONE HYDROCHLORIDE 10 MG/1
10 TABLET ORAL
COMMUNITY
Start: 2022-01-01 | End: 2023-01-01

## 2023-01-01 RX ORDER — ZOLPIDEM TARTRATE 10 MG/1
10 TABLET ORAL
Qty: 90 EACH | Refills: 0 | Status: SHIPPED | OUTPATIENT
Start: 2023-01-01 | End: 2023-01-01

## 2023-01-01 RX ORDER — TAMSULOSIN HYDROCHLORIDE 0.4 MG/1
0.4 CAPSULE ORAL
Qty: 90 CAPSULE | Refills: 3 | Status: SHIPPED | OUTPATIENT
Start: 2023-01-01 | End: 2023-01-01 | Stop reason: SDUPTHER

## 2023-01-01 RX ORDER — LEVOTHYROXINE SODIUM 88 UG/1
88 TABLET ORAL
COMMUNITY
Start: 2022-01-01 | End: 2023-01-01

## 2023-01-01 RX ORDER — DIAZEPAM 5 MG/1
TABLET ORAL
COMMUNITY
Start: 2022-01-01 | End: 2023-01-01

## 2023-01-01 RX ORDER — LEVOTHYROXINE SODIUM 88 UG/1
TABLET ORAL
Qty: 90 TABLET | Refills: 3 | Status: SHIPPED | OUTPATIENT
Start: 2023-01-01 | End: 2023-05-31 | Stop reason: CLARIF

## 2023-01-01 RX ORDER — FLUOXETINE HYDROCHLORIDE 40 MG/1
40 CAPSULE ORAL DAILY
Qty: 90 CAPSULE | Refills: 3 | Status: SHIPPED | OUTPATIENT
Start: 2023-01-01 | End: 2023-05-31 | Stop reason: CLARIF

## 2023-01-01 RX ORDER — ONDANSETRON 4 MG/1
TABLET, FILM COATED ORAL
COMMUNITY
Start: 2023-01-01

## 2023-01-01 RX ORDER — TAMSULOSIN HYDROCHLORIDE 0.4 MG/1
0.4 CAPSULE ORAL
Qty: 90 CAPSULE | Refills: 3 | Status: SHIPPED | OUTPATIENT
Start: 2023-01-01 | End: 2023-05-31 | Stop reason: CLARIF

## 2023-01-01 RX ORDER — PREDNISONE 20 MG/1
20 TABLET ORAL DAILY
Qty: 30 TABLET | Refills: 3 | Status: SHIPPED | OUTPATIENT
Start: 2023-01-01 | End: 2023-01-01

## 2023-01-01 RX ORDER — LACTULOSE 10 G/15ML
20 SOLUTION ORAL 2 TIMES DAILY
Qty: 240 ML | Refills: 3 | Status: SHIPPED | OUTPATIENT
Start: 2023-01-01

## 2023-01-01 RX ORDER — ONDANSETRON 4 MG/1
4 TABLET, FILM COATED ORAL EVERY 4 HOURS PRN
Qty: 60 TABLET | Refills: 3 | Status: SHIPPED | OUTPATIENT
Start: 2023-01-01 | End: 2023-01-01

## 2023-01-01 RX ORDER — ONDANSETRON 4 MG/1
4 TABLET, FILM COATED ORAL EVERY 4 HOURS PRN
Qty: 20 TABLET | Refills: 3 | Status: SHIPPED | OUTPATIENT
Start: 2023-01-01 | End: 2023-01-01

## 2023-01-01 RX ORDER — CEPHALEXIN 500 MG/1
CAPSULE ORAL
COMMUNITY
Start: 2023-01-01 | End: 2023-01-01

## 2023-01-01 ASSESSMENT — PATIENT HEALTH QUESTIONNAIRE - PHQ9: CLINICAL INTERPRETATION OF PHQ2 SCORE: 0

## 2023-01-01 ASSESSMENT — FIBROSIS 4 INDEX
FIB4 SCORE: 0.42
FIB4 SCORE: 0.43

## 2023-01-20 PROBLEM — R74.8 ELEVATED ALKALINE PHOSPHATASE LEVEL: Status: ACTIVE | Noted: 2023-01-01

## 2023-01-20 NOTE — ASSESSMENT & PLAN NOTE
This is a chronic health problem for this patient that he just had a pain pump implanted and they did not work prior to that procedure.  His blood work comes back showing that he is microcytic and anemic.  His hemoglobin is down to 9.7 and his MCV is down to 80.3 his platelet count was elevated at 644,000 all of these may be changes just directly related to the mesothelioma.  I am concerned because the alkaline phosphatase is elevated at 215 but they did not break it down into isoenzymes.  Were going to order isoenzymes to determine whether or not this is liver or bone which will help us to better know how his mesothelioma is progressing.

## 2023-01-20 NOTE — ASSESSMENT & PLAN NOTE
This is a chronic problem well controlled with Ambien.  It is time for his refill.  Patient states that even with the Ambien he does not sleep straight through the night because of his pain, he does find that it helps him get to sleep.

## 2023-01-20 NOTE — PROGRESS NOTES
This patient is dying with malignant mesothelioma.  He has been living with this diagnosis for nearly 4 years.  He is getting very thin.  He has been working with pain management and they have put in a pain pump to try and help him with his overall pain and that seems to be doing well.  He is alert and oriented today as I come into the room and even remembers a joke from when he was last here 3 months ago.      CC: Diagnoses of Malignant mesothelioma of pleura (HCC), Elevated alkaline phosphatase level, Essential hypertension, and Primary insomnia were pertinent to this visit.                                                                                                                                         HPI:   Marcial presents today with the following concerns:    1. Malignant mesothelioma of pleura (HCC)  Chronic health problem uncontrolled and progressing.  Patient requesting palliative care referral at this time.    2. Elevated alkaline phosphatase level  Acute health problem uncontrolled.  Found on blood work done 12/29/2022.  We need to follow-up on isoenzymes.    3. Essential hypertension  Chronic health problem well-controlled with blood pressure medicine lisinopril 5 mg daily.  Patient is starting to have lightheadedness again.  We will try cutting his dose to 2.5 mg daily and see if that is helpful with the lightheadedness.    4. Primary insomnia  Chronic health problem well-controlled on Ambien 10 mg daily.  Patient is due for his refill and is here today to receive.       Patient Active Problem List    Diagnosis Date Noted    Elevated alkaline phosphatase level 01/20/2023    Unintended weight loss 01/07/2022    Acquired hypothyroidism 01/07/2022    Chronic low back pain 01/18/2021    Chronic, continuous use of opioids 01/18/2021    Lumbar post-laminectomy syndrome 01/18/2021    Cancer associated pain 11/19/2020    Hypoxia 10/22/2020    Combined forms of age-related cataract of both eyes 02/04/2020     "Malignant mesothelioma of pleura (HCC) 03/11/2019    S/P ablation of atrial flutter 01/10/2019    Statin intolerance 01/10/2019    Primary insomnia 01/03/2019    Health care maintenance 01/03/2019    Osteoarthrosis 02/23/2018    Tobacco abuse disorder 10/18/2011    Vitamin D deficiency 06/04/2010    Displacement of lumbar intervertebral disc without myelopathy     Essential hypertension     Mixed hyperlipidemia     Esophageal reflux     Disturbance in sleep behavior     Palpitations     Disc disease with myelopathy, lumbar        Current Outpatient Medications   Medication Sig Dispense Refill    cephALEXin (KEFLEX) 500 MG Cap TAKE 1 TABLET BY MOUTH EVERY 12 HOURS FOR 7 DAYS      tamsulosin (FLOMAX) 0.4 MG capsule Take 1 Capsule by mouth 1/2 hour after breakfast for 90 days. 90 Capsule 3    zolpidem (AMBIEN) 10 MG Tab Take 1 Tablet by mouth at bedtime for 90 days. 90 Each 0    lisinopril (PRINIVIL) 5 MG Tab TAKE 1 TABLET BY MOUTH EVERY DAY 90 Tablet 3    levothyroxine (SYNTHROID) 88 MCG Tab TAKE 1 TABLET BY MOUTH EVERY DAY IN THE MORNING ON AN EMPTY STOMACH 90 Tablet 3    fluoxetine (PROZAC) 40 MG capsule Take 1 Capsule by mouth every day. 90 Capsule 3    oxyCODONE immediate release (ROXICODONE) 10 MG immediate release tablet        No current facility-administered medications for this visit.         Allergies as of 01/20/2023 - Reviewed 01/20/2023   Allergen Reaction Noted    Atorvastatin  01/10/2019    Hmg-coa-r inhibitors Unspecified 01/18/2021            /58 (BP Location: Right arm, Patient Position: Sitting, BP Cuff Size: Adult)   Pulse 64   Temp 36.2 °C (97.2 °F) (Temporal)   Resp 12   Ht 1.753 m (5' 9\")   Wt 64.4 kg (142 lb)   SpO2 93%   BMI 20.97 kg/m²     Physical Exam:  Gen:         Alert and oriented, cachectic appearance  Ears: Cerumen impaction bilaterally that we were able to remove with ear wash and pick.  Neck:        No Lymphadenopathy or Bruits.  Lungs:     Clear to auscultation " bilaterally  CV:          Regular rate and rhythm. No murmurs, rubs or gallops.               Ext:          No clubbing, cyanosis, edema.    LABS: 12/29/2022: Results reviewed and discussed with the patient, questions answered.      Assessment and Plan.   67 y.o. male with the following issues:    Malignant mesothelioma of pleura (HCC)  This is a chronic health problem for this patient that he just had a pain pump implanted and they did not work prior to that procedure.  His blood work comes back showing that he is microcytic and anemic.  His hemoglobin is down to 9.7 and his MCV is down to 80.3 his platelet count was elevated at 644,000 all of these may be changes just directly related to the mesothelioma.  I am concerned because the alkaline phosphatase is elevated at 215 but they did not break it down into isoenzymes.  Were going to order isoenzymes to determine whether or not this is liver or bone which will help us to better know how his mesothelioma is progressing.    Elevated alkaline phosphatase level  This is a new problem for this patient found on blood work that was done 12/29/2022.  His alkaline phosphatase is elevated at 215.  He has a known malignant mesothelioma.  We need to get that breakdown on the isoenzymes to see if it has invaded his liver.    Essential hypertension  This is a chronic health problem for this patient well-controlled currently on lisinopril 5 mg daily.  We had stopped his other blood pressure meds.  His blood pressure today is 102/58.  He tells me though that when he stands up he is very lightheaded.  I am going to have his wife start cutting his tablets in half and just take a half tab daily and he will keep an eye on his blood pressure as long as it staying less than 140/90 we are in good shape.    Primary insomnia  This is a chronic problem well controlled with Ambien.  It is time for his refill.

## 2023-01-20 NOTE — ASSESSMENT & PLAN NOTE
This is a new problem for this patient found on blood work that was done 12/29/2022.  His alkaline phosphatase is elevated at 215.  He has a known malignant mesothelioma.  We need to get that breakdown on the isoenzymes to see if it has invaded his liver.

## 2023-01-20 NOTE — ASSESSMENT & PLAN NOTE
This is a chronic health problem for this patient well-controlled currently on lisinopril 5 mg daily.  We had stopped his other blood pressure meds.  His blood pressure today is 102/58.  He tells me though that when he stands up he is very lightheaded.  I am going to have his wife start cutting his tablets in half and just take a half tab daily and he will keep an eye on his blood pressure as long as it staying less than 140/90 we are in good shape.

## 2023-01-31 NOTE — PROGRESS NOTES
In-Home Palliative Medicine Evaluation      Marcial Morrison  67 y.o.  Male  MRN [id number]  PCP Kandi Rangel M.D.  Referral Source: Kandi Rangel M.D.  Location: Mr. Morrison's private residence    Reason for palliative medicine consultation and/or visit: goals of care discussion and symptom management    Assessment and Plan:    Summary: Met with Zay in their home.  Marcial decided to forgo curative and life extending focused treatment after his initial chemotherapy in April 2019.  Since he has pursued treatment related to pain, and is being treated by pain management medicine, and have recently placed a pain pump that has been effective.  Marcial and Maryellen express wanting to focus on quality of life, and staying home, and possibly initiate hospice soon. Palliative Medicine will follow in the interim and help address symptom management and goals of care.     Primary diagnosis: Malignant mesothelioma of pleura    Prognosis: PPS 50% indicating that if the disease process were to run it's natural course, survival is likely less than 6 months.    Physical aspects of care:  Pain - New intrathecal pain pump placed 1/6/2023 infusing dilaudid, and is effective for left rib, and spinal pain.  This pain is constant, triggered spikes of pain with movement, coughing and deep breathing.  Even with pump, he still requires up to 30 mg of additional oxycodone daily, yet this is less than previous prn oxycodone dosing.  He states he has more relief than he has in a long time.  Dyspnea - He complains of shortness of breath on minimal exertion, he utilizes a portable O2 machine that he is borrowing as needed, mostly at night.  Nausea/Vomiting - Marcial is frequently nauseated, and is vomiting up to 2-3 times a week.  He explains that its worse since his left lung tumor has put pressure on his upper GI system.  He has not been using anything for nausea.  He has daily bowel movements using miralax daily.  Appetite - He admits  "to having no appetite, but eats regardless.    Weight loss - Loss of ~ 50 lbs in the last 18 months.  Fatigue - He expresses grief about not having energy to do \"much of anything.\" He would like to work in his shop that is in his back yard, though gets fatigued quickly and finds himself just sitting down and falling asleep.  Sleep - 15-18 hours a day, interrupted, waking early in the morning, taking zolpidem and is effective to induce sleep     New orders/recommendations:  Hospice referral once ready  Prednisone 20mg daily - stimulate appetite, decrease nausea, improve pain, improve breathing  Ondansetron 4mg tablets every four hours as needed - decrease nausea    Psychological aspects of care:  History of depression, and is being treated with fluoxetine.    Existential anxiety, facing mortality and disability related to his disease process.  Grieving loss of ability to live as before.  He expresses gratitude for his family, his wife, Maryellen, who he says is his best friend, and for the life he has led. He shows affection for his Javon and new puppy \"Bennie\" during this encounter.    New orders/recommendations:  Continue to find gratitude in current life  Connect with family, talk about what is most important  Consider Hospice referral    Social aspects of care:  Lives with wife Maryellen of over 40 years.  Maryellen is a very capable caregiver. Daughter's live close by.    Spiritual aspects of care:  He expresses that he is relieved to participate in a discussion about what he is going through beyond his physical condition.  He looks forward to exploring this further.    Goals of care:  Marcial expresses that he wants to stay home with his wife and family and avoid hospitalization.  He states wants to be able to spend time in his workshop, and enjoy his day to day living.  He states that he wants to focus on symptom management.     Advance care planning:  Marcial has capacity to make his own medical decisions.  Maryellen is his DPOA " and next of kin. Advance Directives including Living Will are complete with the focus on comfort.  Introduced POLST this visit, with CPR portion complete stating DNR.    The patient and/or legal decision maker has provided voluntary consent to discuss advance care planning. We discussed disease process, prognosis, hospitalization, comfort focused treatment, and hospice. The following documents were discussed and reviewed DPOA, Living Will, and completed, POLST, and are currently located in a binder in the living room. Total time spent in ACP discussion 45 minutes, which is separate from the time spent completing the evaluation and management visit.     Pertinent Physical Exam Findings:  Vital Signs: HR 88, RR 18, 138/62, O2 90% RA  Constitutional: Cachectic, ill appearing, well groomed  HEENT: hoarse voice  Pulm/Chest: minimal breath sounds all left lobes, clear upper right, diminished right lower  CVS: pallor, murmur  Abdomen: non-tender, slightly distended  MSK: global weakness, slow ambulation   Neuro: awake, alert, oriented  Skin: intact  Psych: appropriate mood and affect for situation, cooperative      Current Medications:    Current Outpatient Medications:     predniSONE (DELTASONE) 20 MG Tab, Take 1 Tablet by mouth every day., Disp: 30 Tablet, Rfl: 3    ondansetron (ZOFRAN) 4 MG Tab tablet, Take 1 Tablet by mouth every four hours as needed for Nausea/Vomiting for up to 30 days., Disp: 20 Tablet, Rfl: 3    cephALEXin (KEFLEX) 500 MG Cap, TAKE 1 TABLET BY MOUTH EVERY 12 HOURS FOR 7 DAYS, Disp: , Rfl:     tamsulosin (FLOMAX) 0.4 MG capsule, Take 1 Capsule by mouth 1/2 hour after breakfast for 90 days., Disp: 90 Capsule, Rfl: 3    zolpidem (AMBIEN) 10 MG Tab, Take 1 Tablet by mouth at bedtime for 90 days., Disp: 90 Each, Rfl: 0    lisinopril (PRINIVIL) 5 MG Tab, TAKE 1 TABLET BY MOUTH EVERY DAY, Disp: 90 Tablet, Rfl: 3    levothyroxine (SYNTHROID) 88 MCG Tab, TAKE 1 TABLET BY MOUTH EVERY DAY IN THE MORNING ON AN  EMPTY STOMACH, Disp: 90 Tablet, Rfl: 3    fluoxetine (PROZAC) 40 MG capsule, Take 1 Capsule by mouth every day., Disp: 90 Capsule, Rfl: 3    oxyCODONE immediate release (ROXICODONE) 10 MG immediate release tablet, , Disp: , Rfl:     Medication Allergies:  Atorvastatin and Hmg-coa-r inhibitors    Thank you for allowing me the opportunity to participate in the care of Marcial Morrison    I spent a total of 90 minutes reviewing medical records, direct face-to-face time with the patient and/or family, documentation and coordination of care.This is separate from the time spent on advance care planning, which is documented above.     Janine VERAS   [facility]   P - [phone]   C - [phone]

## 2023-02-22 NOTE — PROGRESS NOTES
In-Home Palliative Medicine Evaluation      Marcial Morrison  67 y.o.  Male  MRN [id number]  PCP Kandi Rangel M.D.  Referral Source: Kandi Rangel M.D.  Location: Mr. Morrison's private residence    Reason for palliative medicine consultation and/or visit: goals of care discussion and symptom management    Assessment and Plan:    Summary: Met with Zay in their home.  Marcial's overall symptom burden has improved, and he has been able to spend more quality time with his family.  Palliative Medicine will continue to support Zay for symptom control and goals of care discussions as needed.    Primary diagnosis: Malignant mesothelioma of pleura    Prognosis: PPS 50% indicating that if the disease process were to run it's natural course, survival is likely less than 6 months.    Physical aspects of care:  Pain - Intrathecal pain pump placed infusing dilaudid, and is effective for left rib, and spinal pain, and has led to decreased breakthrough pain.  He continues to have left rib pain, and some abdominal pain, yet expresses gratitude for the pain pump and attributes this to his improved quality of life.  Dyspnea - He has reduced his use of O2, and states he is less short of breath.  Nausea/Vomiting - Marcial is frequently nauseated, and continues to vomit up to 2-3 times a week, but he has improved with reduction of nausea with prednisone and zofran use.    He has daily bowel movements when using using miralax.  Appetite - He has improved appetite with prednisone and zofran use.    Fatigue - Continues with fatigue, though has some improvement since last encounter.  Sleep - interrupted and sleeping less, though still much of the day.    New orders/recommendations:  Hospice referral once ready  Refill zofran    Social aspects of care:  Lives with wife Maryellen of over 40 years.  Maryellen is a very capable caregiver. Daughter's live close by.    Spiritual aspects of care:  Expressing gratitude for his life  experiences, and his relationship with his creator.     Goals of care:  Marcial expresses that he wants to stay home with his wife and family and avoid hospitalization.  He states wants to be able to spend time in his workshop, and enjoy his day to day living.  He states that he wants to focus on symptom management.     Advance care planning:  Marcial has capacity to make his own medical decisions.  Maryellen is his DPOA and next of kin. Advance Directives including Living Will are complete with the focus on comfort.  DNR.    The patient and/or legal decision maker has provided voluntary consent to discuss advance care planning. We discussed disease process, prognosis, hospitalization, comfort focused treatment, and hospice. Total time spent in ACP discussion 20 minutes, which is separate from the time spent completing the evaluation and management visit.     Pertinent Physical Exam Findings:  Vital Signs: HR 70, RR 18,O2 90% RA  Constitutional: Cachectic, improved appearance, well groomed  HEENT: hoarse voice  Pulm/Chest: minimal breath sounds all left lobes, clear upper right, diminished right lower  CVS: murmur  Abdomen: non-tender, slightly distended  MSK: global weakness, slow ambulation   Neuro: awake, alert, oriented to person place, time and situation  Skin: intact  Psych: appropriate mood, pleasant, and affect for situation, cooperative    Current Medications:    Current Outpatient Medications:     ondansetron (ZOFRAN) 4 MG Tab tablet, Take 1 Tablet by mouth every four hours as needed for Nausea/Vomiting for up to 14 days., Disp: 20 Tablet, Rfl: 0    predniSONE (DELTASONE) 20 MG Tab, Take 1 Tablet by mouth every day., Disp: 30 Tablet, Rfl: 3    cephALEXin (KEFLEX) 500 MG Cap, TAKE 1 TABLET BY MOUTH EVERY 12 HOURS FOR 7 DAYS, Disp: , Rfl:     tamsulosin (FLOMAX) 0.4 MG capsule, Take 1 Capsule by mouth 1/2 hour after breakfast for 90 days., Disp: 90 Capsule, Rfl: 3    zolpidem (AMBIEN) 10 MG Tab, Take 1 Tablet by  mouth at bedtime for 90 days., Disp: 90 Each, Rfl: 0    lisinopril (PRINIVIL) 5 MG Tab, TAKE 1 TABLET BY MOUTH EVERY DAY, Disp: 90 Tablet, Rfl: 3    levothyroxine (SYNTHROID) 88 MCG Tab, TAKE 1 TABLET BY MOUTH EVERY DAY IN THE MORNING ON AN EMPTY STOMACH, Disp: 90 Tablet, Rfl: 3    fluoxetine (PROZAC) 40 MG capsule, Take 1 Capsule by mouth every day., Disp: 90 Capsule, Rfl: 3    oxyCODONE immediate release (ROXICODONE) 10 MG immediate release tablet, , Disp: , Rfl:     Medication Allergies:  Atorvastatin and Hmg-coa-r inhibitors    Thank you for allowing me the opportunity to participate in the care of Marcial Morrison    I spent a total of 60 minutes reviewing medical records, direct face-to-face time with the patient and/or family, documentation and coordination of care.This is separate from the time spent on advance care planning, which is documented above.       Janine VERAS   [facility]   P - [phone]   C  [phone]

## 2023-03-02 NOTE — PROGRESS NOTES
Spoke to Maryellen.  Marcial is going for a PET scan soon.  She states that he may have metastasis to his liver.  Will call when complete, and will discuss hospice further.  Refilled Zofran.

## 2023-04-05 NOTE — PROGRESS NOTES
In-Home Palliative Medicine Evaluation      Marcial Morrison  68 y.o.  Male  MRN [id number]  PCP Kandi Rangel M.D.  Referral Source: Kandi Rangel M.D.  Location: Marcial's private residence    Reason for palliative medicine consultation and/or visit: Discuss further goals of care, symptom management.    Assessment and Plan:    Summary: Met with Zay in their home.  Marcial's symptoms have changed, with increased fatigue, confusion, and newly found ascites.  There is concern regarding liver and spleen involvement, thus increasing urgency surrounding goals of care decisions.  Discussed hospice services more in depth .  Placed a hospice referral. Palliative Medicine will continue to support Zay for symptom control and goals of care discussions as needed.    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    Physical aspects of care:  Pain - Intrathecal pain pump placed infusing dilaudid, and is effective for left rib, and spinal pain, and has led to decreased breakthrough pain.  He continues to have left rib pain, and some abdominal pain, yet expresses gratitude for the pain pump and attributes this to his improved quality of life. Complains of increased abdominal pain, specifically to his left upper quad.  He describes this pain as intense sharp and pulsing.  During this encounter he received a total of 10 mg oxycodone, and had 10 mg about 30 minutes prior to this encounter.  His pain was still significant prior to ending the encounter, however he had improvement.  Dyspnea - Not using oxygen at this time, is not complaining of shortness of breath.  Nausea/Vomiting - Marcial is frequently nauseated, and continues to vomit up to every day, but he has improved with reduction of  nausea with prednisone and zofran use.    He has difficult, few bowel movements since last weekend, though has not continued his miralax daily.    Appetite - He has decreased appetite He is not using dexamethasone, or prednisone at this time  Fatigue - Continues with fatigue and is having more trouble getting out of bed, and participating in daily activities with family.  Nothing has seemed to help this.  Sleep - interrupted and sleeping less, though still much of the day.  Confusion Is a new symptom, this is causing increasing concern.  He has had bouts of forgetfulness and trouble finding words.  He is also not been able to identify common items on occasion.    New orders/recommendations:  Hospice referral  Lactulose BID to reduce constipation, and to approve ammonia build-up  Utilize oxycodone more often if needed    Social aspects of care:  Lives with wife Maryellen of over 40 years.  Maryellen is a very capable caregiver. Daughters live close by.    Spiritual aspects of care:  Expressing gratitude for his life experiences.    Goals of care:  Marcial expresses that he wants to stay home with his wife and family and avoid hospitalization. He states that he wants to focus on symptom management.     Advance care planning:  Marcial has capacity to make his own medical decisions, though this may change soon.  Maryellen is his DPOA and next of kin. Advance Directives including Living Will are complete with the focus on comfort.  DNR.    The patient and/or legal decision maker has provided voluntary consent to discuss advance care planning. We discussed disease process, prognosis, hospitalization, comfort focused treatment, and hospice. Total time spent in ACP discussion 30 minutes, which is separate from the time spent completing the evaluation and management visit.     Pertinent Physical Exam Findings:  Vital Signs: RR 16,O2 90% RA  Constitutional: Cachectic, ill, and some jaundice appearance, well groomed  HEENT: hoarse  voice  Abdomen: ascites present, guarding   MSK: global weakness, slow ambulation   Neuro: awake, adrowsy, oriented to person place, time and situation  Skin: intact  Psych: appropriate mood, pleasant, and affect for situation, cooperative      Current Medications:    Current Outpatient Medications:     lactulose (CONSTULOSE) 10 GM/15ML Solution, Take 30 mL by mouth 2 times a day., Disp: 240 mL, Rfl: 3    tamsulosin (FLOMAX) 0.4 MG capsule, Take 1 Capsule by mouth 1/2 hour after breakfast for 90 days., Disp: 90 Capsule, Rfl: 3    zolpidem (AMBIEN) 10 MG Tab, Take 1 Tablet by mouth at bedtime for 90 days., Disp: 90 Each, Rfl: 0    levothyroxine (SYNTHROID) 88 MCG Tab, TAKE 1 TABLET BY MOUTH EVERY DAY IN THE MORNING ON AN EMPTY STOMACH, Disp: 90 Tablet, Rfl: 3    fluoxetine (PROZAC) 40 MG capsule, Take 1 Capsule by mouth every day., Disp: 90 Capsule, Rfl: 3    oxyCODONE immediate release (ROXICODONE) 10 MG immediate release tablet, , Disp: , Rfl:     predniSONE (DELTASONE) 20 MG Tab, Take 1 Tablet by mouth every day. (Patient not taking: Reported on 4/5/2023), Disp: 30 Tablet, Rfl: 3    lisinopril (PRINIVIL) 5 MG Tab, TAKE 1 TABLET BY MOUTH EVERY DAY (Patient not taking: Reported on 4/5/2023), Disp: 90 Tablet, Rfl: 3    Medication Allergies:  Atorvastatin and Hmg-coa-r inhibitors    Thank you for allowing me the opportunity to participate in the care of Marcial Morrison    I spent a total of 90 minutes reviewing medical records, direct face-to-face time with the patient and/or family, documentation and coordination of care. This is separate from the time spent on advance care planning, which is documented above.       Janine VERAS   [facility] Outpatient Palliative [facility]   P - [phone]   C - [phone]

## 2023-04-14 PROBLEM — F32.1 CURRENT MODERATE EPISODE OF MAJOR DEPRESSIVE DISORDER WITHOUT PRIOR EPISODE (HCC): Status: ACTIVE | Noted: 2023-01-01

## 2023-04-14 NOTE — PROGRESS NOTES
HCC Gap Form    Diagnosis to address: F33.1 - Major depressive disorder, recurrent, moderate (HCC)  Assessment and plan: Chronic, stable. Continue with fluoxetine 40 mg daily.  The patient is entering hospice care and I suspect he has less than 4 weeks to live.  We discussed all of that today and he is prepared for what is to come.   Last edited 04/14/23 10:58 PDT by Kandi Rangel M.D.           CC: Diagnoses of Cancer associated pain and Malignant mesothelioma of pleura (HCC) were pertinent to this visit.    Subjective                                                                                                                                       HPI:   Marcial presents today with the following concerns:    1. Cancer associated pain  This is a chronic problem that is poorly controlled.  This patient continues to experience significant pain now in his abdomen.  As I walked into the room he was writhing on the table gripping at his wife complaining of the severity of his pain and baking for Percocet.  I then talked with the hospice nurse and they will get liquid morphine delivered to their home today.  In addition, I am going to have the patient chew up the oxycodone and put it under his tongue as they drive back to [place] which is about a 45-minute drive.    2. Malignant mesothelioma of pleura (HCC)  Chronic health problem uncontrolled and now terminal.  Patient has entered into [facility].       Patient Active Problem List    Diagnosis Date Noted    Elevated alkaline phosphatase level 01/20/2023    Unintended weight loss 01/07/2022    Acquired hypothyroidism 01/07/2022    Chronic low back pain 01/18/2021    Chronic, continuous use of opioids 01/18/2021    Lumbar post-laminectomy syndrome 01/18/2021    Cancer associated pain 11/19/2020    Hypoxia 10/22/2020    Combined forms of age-related cataract of both eyes 02/04/2020    Malignant mesothelioma of pleura (HCC) 03/11/2019    S/P ablation of atrial flutter  "01/10/2019    Statin intolerance 01/10/2019    Primary insomnia 01/03/2019    Health care maintenance 01/03/2019    Osteoarthrosis 02/23/2018    Tobacco abuse disorder 10/18/2011    Vitamin D deficiency 06/04/2010    Displacement of lumbar intervertebral disc without myelopathy     Essential hypertension     Mixed hyperlipidemia     Esophageal reflux     Disturbance in sleep behavior     Palpitations     Disc disease with myelopathy, lumbar        Current Outpatient Medications   Medication Sig Dispense Refill    ondansetron (ZOFRAN) 4 MG Tab tablet TAKE 1 TABLET BY MOUTH EVERY FOUR HOURS AS NEEDED FOR NAUSEA/VOMITING FOR UP TO 30 DAYS.      tamsulosin (FLOMAX) 0.4 MG capsule Take 1 Capsule by mouth 1/2 hour after breakfast for 90 days. 90 Capsule 3    fluoxetine (PROZAC) 40 MG capsule TAKE 1 CAPSULE BY MOUTH EVERY DAY 90 Capsule 3    lactulose (CONSTULOSE) 10 GM/15ML Solution Take 30 mL by mouth 2 times a day. 240 mL 3    zolpidem (AMBIEN) 10 MG Tab Take 1 Tablet by mouth at bedtime for 90 days. 90 Each 0    levothyroxine (SYNTHROID) 88 MCG Tab TAKE 1 TABLET BY MOUTH EVERY DAY IN THE MORNING ON AN EMPTY STOMACH 90 Tablet 3    oxyCODONE immediate release (ROXICODONE) 10 MG immediate release tablet        No current facility-administered medications for this visit.         Allergies as of 04/14/2023 - Reviewed 04/14/2023   Allergen Reaction Noted    Atorvastatin  01/10/2019    Hmg-coa-r inhibitors Unspecified 01/18/2021          Objective      /62 (BP Location: Left arm, Patient Position: Sitting, BP Cuff Size: Adult)   Pulse 71   Temp 36.5 °C (97.7 °F) (Temporal)   Resp 12   Ht 1.753 m (5' 9\")   Wt 58.1 kg (128 lb)   SpO2 93%   BMI 18.90 kg/m²     Physical Exam:  Gen:         Alert and oriented, No apparent distress.  Neck:        No Lymphadenopathy or Bruits.  Lungs:     Clear to auscultation bilaterally  CV:          Regular rate and rhythm. No murmurs, rubs or gallops.               Ext:          No " clubbing, cyanosis, edema.    Discussed his PET scan results from 3/30/2023.  Patient understands that he is terminal and probably will pass away in the coming 4 weeks.  We discussed all of his concerns about pain, treatment and what to expect as his death comes closer.    Time spent with patient was 30 minutes discussing all the topics above.    Assessment & Plan    68 y.o. male with the following issues:    Cancer associated pain  This is a chronic problem that is now severe.  Patient has gone     Malignant mesothelioma of pleura (HCC)  This is a chronic health problem that is uncontrolled.  The patient did a PET scan that shows his mesothelioma is now extensive in the Left hemithorax with bulky tumor.  His pain is severe and uncontrolled.  He is now on [facility] and we will stay in touch through them.

## 2023-04-14 NOTE — ASSESSMENT & PLAN NOTE
This is a chronic health problem that is uncontrolled.  The patient did a PET scan that shows his mesothelioma is now extensive in the Left hemithorax with bulky tumor.  His pain is severe and uncontrolled.  He is now on [facility] and we will stay in touch through them.

## 2025-01-04 NOTE — ASSESSMENT & PLAN NOTE
Removed intact This is a chronic health problem for this patient where he requires zolpidem nightly for sleep.  Without this he cannot get adequate sleep.  He uses it appropriately..  His  report is appropriate.  We will renew and see him back in 90 days before he needs his next prescription